# Patient Record
Sex: FEMALE | Race: WHITE | NOT HISPANIC OR LATINO | Employment: OTHER | URBAN - METROPOLITAN AREA
[De-identification: names, ages, dates, MRNs, and addresses within clinical notes are randomized per-mention and may not be internally consistent; named-entity substitution may affect disease eponyms.]

---

## 2017-01-03 ENCOUNTER — COMMUNICATION - HEALTHEAST (OUTPATIENT)
Dept: INTERNAL MEDICINE | Facility: CLINIC | Age: 77
End: 2017-01-03

## 2017-01-03 DIAGNOSIS — N95.2 ATROPHIC VAGINITIS: ICD-10-CM

## 2017-01-03 DIAGNOSIS — F41.9 ANXIETY: ICD-10-CM

## 2017-01-03 DIAGNOSIS — I10 HTN (HYPERTENSION): ICD-10-CM

## 2017-01-04 ENCOUNTER — OFFICE VISIT - HEALTHEAST (OUTPATIENT)
Dept: AUDIOLOGY | Facility: CLINIC | Age: 77
End: 2017-01-04

## 2017-01-04 DIAGNOSIS — H90.3 SENSORINEURAL HEARING LOSS, ASYMMETRICAL: ICD-10-CM

## 2017-01-04 DIAGNOSIS — H92.01 OTALGIA, RIGHT: ICD-10-CM

## 2017-01-06 ENCOUNTER — OFFICE VISIT - HEALTHEAST (OUTPATIENT)
Dept: OTOLARYNGOLOGY | Facility: CLINIC | Age: 77
End: 2017-01-06

## 2017-01-06 DIAGNOSIS — J01.01 RECURRENT MAXILLARY SINUSITIS: ICD-10-CM

## 2017-01-06 ASSESSMENT — MIFFLIN-ST. JEOR: SCORE: 1052.62

## 2017-01-11 ENCOUNTER — COMMUNICATION - HEALTHEAST (OUTPATIENT)
Dept: INTERNAL MEDICINE | Facility: CLINIC | Age: 77
End: 2017-01-11

## 2017-01-11 DIAGNOSIS — R06.2 WHEEZING: ICD-10-CM

## 2017-01-19 ENCOUNTER — HOSPITAL ENCOUNTER (OUTPATIENT)
Dept: MAMMOGRAPHY | Facility: HOSPITAL | Age: 77
Discharge: HOME | End: 2017-01-19
Attending: INTERNAL MEDICINE

## 2017-01-19 DIAGNOSIS — Z12.31 VISIT FOR SCREENING MAMMOGRAM: ICD-10-CM

## 2017-01-20 ENCOUNTER — HOSPITAL ENCOUNTER (OUTPATIENT)
Dept: MAMMOGRAPHY | Facility: HOSPITAL | Age: 77
Discharge: HOME | End: 2017-01-20
Attending: INTERNAL MEDICINE

## 2017-01-20 DIAGNOSIS — N64.89 BREAST ASYMMETRY: ICD-10-CM

## 2017-02-09 ENCOUNTER — OFFICE VISIT - HEALTHEAST (OUTPATIENT)
Dept: ALLERGY | Facility: CLINIC | Age: 77
End: 2017-02-09

## 2017-02-09 DIAGNOSIS — J30.89 ALLERGIC RHINITIS DUE TO OTHER ALLERGEN: ICD-10-CM

## 2017-02-09 DIAGNOSIS — Z88.9 DRUG ALLERGY: ICD-10-CM

## 2017-02-09 DIAGNOSIS — R06.2 WHEEZING: ICD-10-CM

## 2017-03-16 ENCOUNTER — OFFICE VISIT - HEALTHEAST (OUTPATIENT)
Dept: ALLERGY | Facility: CLINIC | Age: 77
End: 2017-03-16

## 2017-03-16 DIAGNOSIS — Z88.9 DRUG ALLERGY: ICD-10-CM

## 2017-03-16 DIAGNOSIS — J22 ACUTE RESPIRATORY INFECTION: ICD-10-CM

## 2017-03-16 ASSESSMENT — MIFFLIN-ST. JEOR: SCORE: 1031.63

## 2017-03-30 ENCOUNTER — COMMUNICATION - HEALTHEAST (OUTPATIENT)
Dept: INTERNAL MEDICINE | Facility: CLINIC | Age: 77
End: 2017-03-30

## 2017-03-30 DIAGNOSIS — J22 ACUTE RESPIRATORY INFECTION: ICD-10-CM

## 2017-04-21 ENCOUNTER — OFFICE VISIT - HEALTHEAST (OUTPATIENT)
Dept: INTERNAL MEDICINE | Facility: CLINIC | Age: 77
End: 2017-04-21

## 2017-04-21 DIAGNOSIS — K21.9 GERD (GASTROESOPHAGEAL REFLUX DISEASE): ICD-10-CM

## 2017-04-21 DIAGNOSIS — F41.9 ANXIETY: ICD-10-CM

## 2017-04-21 DIAGNOSIS — M19.90 OA (OSTEOARTHRITIS): ICD-10-CM

## 2017-04-21 DIAGNOSIS — I10 HTN (HYPERTENSION): ICD-10-CM

## 2017-04-21 DIAGNOSIS — N95.2 ATROPHIC VAGINITIS: ICD-10-CM

## 2017-04-21 DIAGNOSIS — J45.20 MILD INTERMITTENT ASTHMA: ICD-10-CM

## 2017-05-24 ENCOUNTER — COMMUNICATION - HEALTHEAST (OUTPATIENT)
Dept: INTERNAL MEDICINE | Facility: CLINIC | Age: 77
End: 2017-05-24

## 2017-05-24 DIAGNOSIS — H04.123 DRY EYES, BILATERAL: ICD-10-CM

## 2017-10-24 ENCOUNTER — OFFICE VISIT - HEALTHEAST (OUTPATIENT)
Dept: INTERNAL MEDICINE | Facility: CLINIC | Age: 77
End: 2017-10-24

## 2017-10-24 DIAGNOSIS — D17.20 LIPOMA OF ARM: ICD-10-CM

## 2017-10-24 DIAGNOSIS — Z23 NEED FOR INFLUENZA VACCINATION: ICD-10-CM

## 2017-10-24 DIAGNOSIS — D75.1 POLYCYTHEMIA: ICD-10-CM

## 2017-10-24 DIAGNOSIS — F41.9 ANXIETY: ICD-10-CM

## 2017-10-24 DIAGNOSIS — Z66 DNR (DO NOT RESUSCITATE): ICD-10-CM

## 2017-10-24 DIAGNOSIS — L28.0 LICHEN SIMPLEX CHRONICUS: ICD-10-CM

## 2017-10-24 DIAGNOSIS — J45.20 MILD INTERMITTENT ASTHMA: ICD-10-CM

## 2017-10-24 DIAGNOSIS — I10 HTN (HYPERTENSION): ICD-10-CM

## 2017-10-24 DIAGNOSIS — L28.0 NEURODERMATITIS: ICD-10-CM

## 2017-10-30 ENCOUNTER — OFFICE VISIT - HEALTHEAST (OUTPATIENT)
Dept: SURGERY | Facility: CLINIC | Age: 77
End: 2017-10-30

## 2017-10-30 DIAGNOSIS — R22.31 ARM MASS, RIGHT: ICD-10-CM

## 2017-10-30 ASSESSMENT — MIFFLIN-ST. JEOR: SCORE: 1010.77

## 2017-11-01 LAB
LAB AP CHARGES (HE HISTORICAL CONVERSION): NORMAL
PATH REPORT.COMMENTS IMP SPEC: NORMAL
PATH REPORT.FINAL DX SPEC: NORMAL
PATH REPORT.GROSS SPEC: NORMAL
PATH REPORT.MICROSCOPIC SPEC OTHER STN: NORMAL
PATH REPORT.RELEVANT HX SPEC: NORMAL
RESULT FLAG (HE HISTORICAL CONVERSION): NORMAL

## 2017-12-11 ENCOUNTER — OFFICE VISIT - HEALTHEAST (OUTPATIENT)
Dept: INTERNAL MEDICINE | Facility: CLINIC | Age: 77
End: 2017-12-11

## 2017-12-11 DIAGNOSIS — H61.22 IMPACTED CERUMEN OF LEFT EAR: ICD-10-CM

## 2017-12-11 DIAGNOSIS — J01.10 ACUTE FRONTAL SINUSITIS, RECURRENCE NOT SPECIFIED: ICD-10-CM

## 2017-12-28 ENCOUNTER — COMMUNICATION - HEALTHEAST (OUTPATIENT)
Dept: INTERNAL MEDICINE | Facility: CLINIC | Age: 77
End: 2017-12-28

## 2017-12-28 DIAGNOSIS — J30.9 RHINITIS, ALLERGIC: ICD-10-CM

## 2017-12-28 DIAGNOSIS — J45.20 MILD INTERMITTENT ASTHMA: ICD-10-CM

## 2017-12-28 DIAGNOSIS — F41.9 ANXIETY: ICD-10-CM

## 2017-12-28 DIAGNOSIS — I10 HTN (HYPERTENSION): ICD-10-CM

## 2018-01-02 ENCOUNTER — COMMUNICATION - HEALTHEAST (OUTPATIENT)
Dept: LAB | Facility: CLINIC | Age: 78
End: 2018-01-02

## 2018-01-02 ENCOUNTER — OFFICE VISIT - HEALTHEAST (OUTPATIENT)
Dept: INTERNAL MEDICINE | Facility: CLINIC | Age: 78
End: 2018-01-02

## 2018-01-02 DIAGNOSIS — J32.2 CHRONIC ETHMOIDAL SINUSITIS: ICD-10-CM

## 2018-01-02 ASSESSMENT — MIFFLIN-ST. JEOR: SCORE: 1018.03

## 2018-02-05 ENCOUNTER — HOSPITAL ENCOUNTER (OUTPATIENT)
Dept: MAMMOGRAPHY | Facility: HOSPITAL | Age: 78
Discharge: HOME | End: 2018-02-05
Attending: INTERNAL MEDICINE

## 2018-02-05 DIAGNOSIS — Z12.31 VISIT FOR SCREENING MAMMOGRAM: ICD-10-CM

## 2018-04-24 ENCOUNTER — OFFICE VISIT - HEALTHEAST (OUTPATIENT)
Dept: INTERNAL MEDICINE | Facility: CLINIC | Age: 78
End: 2018-04-24

## 2018-04-24 DIAGNOSIS — F41.9 ANXIETY: ICD-10-CM

## 2018-04-24 DIAGNOSIS — J45.20 MILD INTERMITTENT ASTHMA: ICD-10-CM

## 2018-04-24 DIAGNOSIS — J32.9 RHINOSINUSITIS: ICD-10-CM

## 2018-04-24 DIAGNOSIS — L60.3 BRITTLE NAILS: ICD-10-CM

## 2018-04-24 DIAGNOSIS — N95.1 MENOPAUSAL HOT FLUSHES: ICD-10-CM

## 2018-04-24 DIAGNOSIS — I10 HTN (HYPERTENSION): ICD-10-CM

## 2018-04-24 DIAGNOSIS — M19.90 OA (OSTEOARTHRITIS): ICD-10-CM

## 2018-05-22 ENCOUNTER — OFFICE VISIT - HEALTHEAST (OUTPATIENT)
Dept: INTERNAL MEDICINE | Facility: CLINIC | Age: 78
End: 2018-05-22

## 2018-05-22 DIAGNOSIS — N76.0 VAGINITIS: ICD-10-CM

## 2018-05-22 DIAGNOSIS — N95.2 ATROPHIC VAGINITIS: ICD-10-CM

## 2018-06-11 ENCOUNTER — COMMUNICATION - HEALTHEAST (OUTPATIENT)
Dept: INTERNAL MEDICINE | Facility: CLINIC | Age: 78
End: 2018-06-11

## 2018-06-11 DIAGNOSIS — N95.2 ATROPHIC VAGINITIS: ICD-10-CM

## 2018-10-26 ENCOUNTER — OFFICE VISIT - HEALTHEAST (OUTPATIENT)
Dept: INTERNAL MEDICINE | Facility: CLINIC | Age: 78
End: 2018-10-26

## 2018-10-26 DIAGNOSIS — K21.9 GERD (GASTROESOPHAGEAL REFLUX DISEASE): ICD-10-CM

## 2018-10-26 DIAGNOSIS — L82.1 SK (SEBORRHEIC KERATOSIS): ICD-10-CM

## 2018-10-26 DIAGNOSIS — J45.20 MILD INTERMITTENT ASTHMA: ICD-10-CM

## 2018-10-26 DIAGNOSIS — F41.9 ANXIETY: ICD-10-CM

## 2018-10-26 DIAGNOSIS — N95.2 ATROPHIC VAGINITIS: ICD-10-CM

## 2018-10-26 DIAGNOSIS — I10 HTN (HYPERTENSION): ICD-10-CM

## 2018-10-26 DIAGNOSIS — J30.9 AR (ALLERGIC RHINITIS): ICD-10-CM

## 2018-10-26 DIAGNOSIS — M19.90 OA (OSTEOARTHRITIS): ICD-10-CM

## 2018-10-26 DIAGNOSIS — Z23 NEED FOR INFLUENZA VACCINATION: ICD-10-CM

## 2018-10-26 DIAGNOSIS — K64.9 HEMORRHOIDS: ICD-10-CM

## 2018-10-30 ENCOUNTER — OFFICE VISIT - HEALTHEAST (OUTPATIENT)
Dept: PODIATRY | Facility: CLINIC | Age: 78
End: 2018-10-30

## 2018-10-30 DIAGNOSIS — M20.41 HAMMER TOE OF RIGHT FOOT: ICD-10-CM

## 2018-10-30 ASSESSMENT — MIFFLIN-ST. JEOR: SCORE: 1031.63

## 2018-10-31 ENCOUNTER — COMMUNICATION - HEALTHEAST (OUTPATIENT)
Dept: INTERNAL MEDICINE | Facility: CLINIC | Age: 78
End: 2018-10-31

## 2018-11-02 ENCOUNTER — COMMUNICATION - HEALTHEAST (OUTPATIENT)
Dept: INTERNAL MEDICINE | Facility: CLINIC | Age: 78
End: 2018-11-02

## 2018-11-20 ENCOUNTER — COMMUNICATION - HEALTHEAST (OUTPATIENT)
Dept: INTERNAL MEDICINE | Facility: CLINIC | Age: 78
End: 2018-11-20

## 2018-12-03 ENCOUNTER — COMMUNICATION - HEALTHEAST (OUTPATIENT)
Dept: INTERNAL MEDICINE | Facility: CLINIC | Age: 78
End: 2018-12-03

## 2018-12-26 ENCOUNTER — OFFICE VISIT - HEALTHEAST (OUTPATIENT)
Dept: FAMILY MEDICINE | Facility: CLINIC | Age: 78
End: 2018-12-26

## 2018-12-26 DIAGNOSIS — J01.91 ACUTE RECURRENT SINUSITIS, UNSPECIFIED LOCATION: ICD-10-CM

## 2019-01-17 ENCOUNTER — COMMUNICATION - HEALTHEAST (OUTPATIENT)
Dept: INTERNAL MEDICINE | Facility: CLINIC | Age: 79
End: 2019-01-17

## 2019-01-17 ENCOUNTER — OFFICE VISIT - HEALTHEAST (OUTPATIENT)
Dept: INTERNAL MEDICINE | Facility: CLINIC | Age: 79
End: 2019-01-17

## 2019-01-17 DIAGNOSIS — N89.8 VAGINAL DISCHARGE: ICD-10-CM

## 2019-01-17 DIAGNOSIS — N95.2 ATROPHIC VAGINITIS: ICD-10-CM

## 2019-01-17 DIAGNOSIS — K64.9 HEMORRHOIDS, UNSPECIFIED HEMORRHOID TYPE: ICD-10-CM

## 2019-01-17 LAB
CLUE CELLS: NORMAL
TRICHOMONAS, WET PREP: NORMAL
YEAST, WET PREP: NORMAL

## 2019-01-17 ASSESSMENT — MIFFLIN-ST. JEOR: SCORE: 1027.1

## 2019-01-21 ENCOUNTER — COMMUNICATION - HEALTHEAST (OUTPATIENT)
Dept: INTERNAL MEDICINE | Facility: CLINIC | Age: 79
End: 2019-01-21

## 2019-01-21 DIAGNOSIS — J30.9 RHINITIS, ALLERGIC: ICD-10-CM

## 2019-03-18 ENCOUNTER — OFFICE VISIT - HEALTHEAST (OUTPATIENT)
Dept: INTERNAL MEDICINE | Facility: CLINIC | Age: 79
End: 2019-03-18

## 2019-03-18 DIAGNOSIS — H69.92 DYSFUNCTION OF LEFT EUSTACHIAN TUBE: ICD-10-CM

## 2019-03-18 DIAGNOSIS — R60.0 BILATERAL LOWER EXTREMITY EDEMA: ICD-10-CM

## 2019-03-18 DIAGNOSIS — I10 ESSENTIAL HYPERTENSION: ICD-10-CM

## 2019-03-18 DIAGNOSIS — J32.9 RECURRENT SINUSITIS: ICD-10-CM

## 2019-03-18 DIAGNOSIS — M81.0 AGE RELATED OSTEOPOROSIS, UNSPECIFIED PATHOLOGICAL FRACTURE PRESENCE: ICD-10-CM

## 2019-03-18 DIAGNOSIS — N95.2 ATROPHIC VAGINITIS: ICD-10-CM

## 2019-03-18 DIAGNOSIS — J30.9 ALLERGIC RHINITIS, UNSPECIFIED SEASONALITY, UNSPECIFIED TRIGGER: ICD-10-CM

## 2019-03-18 DIAGNOSIS — K64.9 HEMORRHOIDS, UNSPECIFIED HEMORRHOID TYPE: ICD-10-CM

## 2019-03-18 DIAGNOSIS — F41.9 ANXIETY: ICD-10-CM

## 2019-03-18 DIAGNOSIS — K21.9 GASTROESOPHAGEAL REFLUX DISEASE WITHOUT ESOPHAGITIS: ICD-10-CM

## 2019-03-18 ASSESSMENT — MIFFLIN-ST. JEOR: SCORE: 1018.03

## 2019-04-15 ENCOUNTER — COMMUNICATION - HEALTHEAST (OUTPATIENT)
Dept: INTERNAL MEDICINE | Facility: CLINIC | Age: 79
End: 2019-04-15

## 2019-04-17 ENCOUNTER — COMMUNICATION - HEALTHEAST (OUTPATIENT)
Dept: INTERNAL MEDICINE | Facility: CLINIC | Age: 79
End: 2019-04-17

## 2019-04-17 DIAGNOSIS — J30.9 RHINITIS, ALLERGIC: ICD-10-CM

## 2019-04-26 ENCOUNTER — AMBULATORY - HEALTHEAST (OUTPATIENT)
Dept: NURSING | Facility: CLINIC | Age: 79
End: 2019-04-26

## 2019-04-26 ENCOUNTER — AMBULATORY - HEALTHEAST (OUTPATIENT)
Dept: INTERNAL MEDICINE | Facility: CLINIC | Age: 79
End: 2019-04-26

## 2019-04-26 ENCOUNTER — HOSPITAL ENCOUNTER (OUTPATIENT)
Dept: MAMMOGRAPHY | Facility: CLINIC | Age: 79
Discharge: HOME | End: 2019-04-26
Attending: INTERNAL MEDICINE

## 2019-04-26 DIAGNOSIS — Z12.31 VISIT FOR SCREENING MAMMOGRAM: ICD-10-CM

## 2019-06-25 ENCOUNTER — OFFICE VISIT - HEALTHEAST (OUTPATIENT)
Dept: FAMILY MEDICINE | Facility: CLINIC | Age: 79
End: 2019-06-25

## 2019-06-25 ENCOUNTER — AMBULATORY - HEALTHEAST (OUTPATIENT)
Dept: NURSING | Facility: CLINIC | Age: 79
End: 2019-06-25

## 2019-06-25 DIAGNOSIS — Z23 IMMUNIZATION DUE: ICD-10-CM

## 2019-06-25 DIAGNOSIS — N89.8 VAGINAL ITCHING: ICD-10-CM

## 2019-06-25 LAB
ALBUMIN UR-MCNC: NEGATIVE MG/DL
APPEARANCE UR: CLEAR
BACTERIA #/AREA URNS HPF: ABNORMAL HPF
BILIRUB UR QL STRIP: NEGATIVE
CLUE CELLS: NORMAL
COLOR UR AUTO: YELLOW
GLUCOSE UR STRIP-MCNC: NEGATIVE MG/DL
HGB UR QL STRIP: ABNORMAL
KETONES UR STRIP-MCNC: NEGATIVE MG/DL
LEUKOCYTE ESTERASE UR QL STRIP: NEGATIVE
NITRATE UR QL: NEGATIVE
PH UR STRIP: 5.5 [PH] (ref 5–8)
RBC #/AREA URNS AUTO: ABNORMAL HPF
SP GR UR STRIP: <=1.005 (ref 1–1.03)
SQUAMOUS #/AREA URNS AUTO: ABNORMAL LPF
TRICHOMONAS, WET PREP: NORMAL
UROBILINOGEN UR STRIP-ACNC: ABNORMAL
WBC #/AREA URNS AUTO: ABNORMAL HPF
YEAST, WET PREP: NORMAL

## 2019-09-16 ENCOUNTER — OFFICE VISIT - HEALTHEAST (OUTPATIENT)
Dept: INTERNAL MEDICINE | Facility: CLINIC | Age: 79
End: 2019-09-16

## 2019-09-16 DIAGNOSIS — I10 ESSENTIAL HYPERTENSION: ICD-10-CM

## 2019-09-16 DIAGNOSIS — N95.2 ATROPHIC VAGINITIS: ICD-10-CM

## 2019-09-16 DIAGNOSIS — J45.20 MILD INTERMITTENT ASTHMA WITHOUT COMPLICATION: ICD-10-CM

## 2019-09-16 DIAGNOSIS — M15.0 PRIMARY OSTEOARTHRITIS INVOLVING MULTIPLE JOINTS: ICD-10-CM

## 2019-09-16 DIAGNOSIS — L29.9 ITCHING OF EAR: ICD-10-CM

## 2019-09-16 DIAGNOSIS — F41.9 ANXIETY: ICD-10-CM

## 2019-09-16 DIAGNOSIS — M67.432 GANGLION CYST OF WRIST, LEFT: ICD-10-CM

## 2019-09-16 DIAGNOSIS — M25.532 LEFT WRIST PAIN: ICD-10-CM

## 2019-09-16 DIAGNOSIS — K21.9 GASTROESOPHAGEAL REFLUX DISEASE, ESOPHAGITIS PRESENCE NOT SPECIFIED: ICD-10-CM

## 2019-09-16 LAB
ALBUMIN SERPL-MCNC: 4.4 G/DL (ref 3.5–5)
ALP SERPL-CCNC: 76 U/L (ref 45–120)
ALT SERPL W P-5'-P-CCNC: 24 U/L (ref 0–45)
ANION GAP SERPL CALCULATED.3IONS-SCNC: 11 MMOL/L (ref 5–18)
AST SERPL W P-5'-P-CCNC: 37 U/L (ref 0–40)
BILIRUB SERPL-MCNC: 0.7 MG/DL (ref 0–1)
BUN SERPL-MCNC: 21 MG/DL (ref 8–28)
CALCIUM SERPL-MCNC: 10.3 MG/DL (ref 8.5–10.5)
CHLORIDE BLD-SCNC: 102 MMOL/L (ref 98–107)
CO2 SERPL-SCNC: 27 MMOL/L (ref 22–31)
CREAT SERPL-MCNC: 0.83 MG/DL (ref 0.6–1.1)
ERYTHROCYTE [DISTWIDTH] IN BLOOD BY AUTOMATED COUNT: 12.1 % (ref 11–14.5)
GFR SERPL CREATININE-BSD FRML MDRD: >60 ML/MIN/1.73M2
GLUCOSE BLD-MCNC: 78 MG/DL (ref 70–125)
HCT VFR BLD AUTO: 39.9 % (ref 35–47)
HGB BLD-MCNC: 13.7 G/DL (ref 12–16)
MCH RBC QN AUTO: 31.5 PG (ref 27–34)
MCHC RBC AUTO-ENTMCNC: 34.3 G/DL (ref 32–36)
MCV RBC AUTO: 92 FL (ref 80–100)
PLATELET # BLD AUTO: 225 THOU/UL (ref 140–440)
PMV BLD AUTO: 7.8 FL (ref 7–10)
POTASSIUM BLD-SCNC: 4.5 MMOL/L (ref 3.5–5)
PROT SERPL-MCNC: 7.1 G/DL (ref 6–8)
RBC # BLD AUTO: 4.34 MILL/UL (ref 3.8–5.4)
SODIUM SERPL-SCNC: 140 MMOL/L (ref 136–145)
WBC: 5.1 THOU/UL (ref 4–11)

## 2020-02-06 ENCOUNTER — COMMUNICATION - HEALTHEAST (OUTPATIENT)
Dept: INTERNAL MEDICINE | Facility: CLINIC | Age: 80
End: 2020-02-06

## 2020-02-19 ENCOUNTER — TELEPHONE (OUTPATIENT)
Dept: NEUROSURGERY | Facility: CLINIC | Age: 80
End: 2020-02-19

## 2020-02-19 NOTE — TELEPHONE ENCOUNTER
[facility] Call Center    Phone Message    May a detailed message be left on voicemail: yes     Reason for Call: Other: Pt had a fall in [place] and had cervical spine fracture surgery done on 02/03/2020 by Dr. Wolfgang Givens. He referred pt to Dr. Polanco and said he's a friend of Dr. Polanco's. Pt said Dr. Givens sent pt's records and images. Pt does have copies of the images on a CD. Please call pt to schedule an appointment.      Action Taken: Message routed to:  [facility] ([facility] Neurosurgery    Travel Screening: Not Applicable

## 2020-02-25 ENCOUNTER — OFFICE VISIT (OUTPATIENT)
Dept: NEUROSURGERY | Facility: CLINIC | Age: 80
End: 2020-02-25
Payer: COMMERCIAL

## 2020-02-25 VITALS
WEIGHT: 119.9 LBS | HEIGHT: 63 IN | DIASTOLIC BLOOD PRESSURE: 83 MMHG | HEART RATE: 68 BPM | OXYGEN SATURATION: 97 % | SYSTOLIC BLOOD PRESSURE: 134 MMHG | BODY MASS INDEX: 21.25 KG/M2

## 2020-02-25 DIAGNOSIS — S12.9XXD CLOSED FRACTURE OF CERVICAL VERTEBRA, UNSPECIFIED CERVICAL VERTEBRAL LEVEL, SUBSEQUENT ENCOUNTER: ICD-10-CM

## 2020-02-25 DIAGNOSIS — Z98.1 S/P CERVICAL SPINAL FUSION: Primary | ICD-10-CM

## 2020-02-25 RX ORDER — ALBUTEROL SULFATE 90 UG/1
1-2 AEROSOL, METERED RESPIRATORY (INHALATION) EVERY 4 HOURS PRN
COMMUNITY
Start: 2018-11-20

## 2020-02-25 RX ORDER — IPRATROPIUM BROMIDE 21 UG/1
2 SPRAY, METERED NASAL DAILY PRN
COMMUNITY

## 2020-02-25 RX ORDER — MULTIVIT WITH MINERALS/LUTEIN
1 TABLET ORAL DAILY
COMMUNITY

## 2020-02-25 RX ORDER — CYCLOSPORINE 0.5 MG/ML
1 EMULSION OPHTHALMIC 2 TIMES DAILY PRN
COMMUNITY
Start: 2017-05-28

## 2020-02-25 RX ORDER — ESTRADIOL 0.1 MG/G
2 CREAM VAGINAL
COMMUNITY

## 2020-02-25 RX ORDER — CITALOPRAM HYDROBROMIDE 20 MG/1
20 TABLET ORAL DAILY
Status: ON HOLD | COMMUNITY
Start: 2019-09-16 | End: 2022-11-01

## 2020-02-25 RX ORDER — METOPROLOL SUCCINATE 50 MG/1
50 TABLET, EXTENDED RELEASE ORAL DAILY
Status: ON HOLD | COMMUNITY
Start: 2019-09-16 | End: 2022-11-05

## 2020-02-25 RX ORDER — CETIRIZINE HYDROCHLORIDE 10 MG/1
10 TABLET ORAL DAILY PRN
COMMUNITY
Start: 2019-11-21 | End: 2023-06-19

## 2020-02-25 ASSESSMENT — ENCOUNTER SYMPTOMS
MYALGIAS: 1
LEG PAIN: 0
FATIGUE: 1
NAUSEA: 0
NIGHT SWEATS: 0
JOINT SWELLING: 0
ABDOMINAL PAIN: 0
NERVOUS/ANXIOUS: 1
DEPRESSION: 1
COUGH: 1
INSOMNIA: 1
DECREASED CONCENTRATION: 0
SNORES LOUDLY: 0
HEARTBURN: 1
BLOATING: 0
PANIC: 0
SLEEP DISTURBANCES DUE TO BREATHING: 1
SINUS CONGESTION: 0
FEVER: 0
POLYPHAGIA: 0
SPUTUM PRODUCTION: 0
VOMITING: 0
SYNCOPE: 1
TASTE DISTURBANCE: 0
PALPITATIONS: 0
CONSTIPATION: 1
SHORTNESS OF BREATH: 1
BACK PAIN: 1
WEIGHT LOSS: 1
HYPERTENSION: 1
HALLUCINATIONS: 0
DECREASED APPETITE: 1
WHEEZING: 0
WEIGHT GAIN: 0
CHILLS: 1
SMELL DISTURBANCE: 0
ORTHOPNEA: 0
INCREASED ENERGY: 1
HEMOPTYSIS: 0
ARTHRALGIAS: 0
NECK PAIN: 1

## 2020-02-25 ASSESSMENT — MIFFLIN-ST. JEOR: SCORE: 987.99

## 2020-02-25 ASSESSMENT — PAIN SCALES - GENERAL: PAINLEVEL: NO PAIN (0)

## 2020-02-25 NOTE — TELEPHONE ENCOUNTER
Patient was scheduled with Dr. Polanco per request on 3/13. Patient wanted to be seen sooner so was scheduled on 2/25 with SOPHIA Tan.    Shikha Naqvi  Director of Customer

## 2020-02-25 NOTE — NURSING NOTE
Chief Complaint   Patient presents with     Consult     [facility] NEW - SURGERY FOLLOW UP       Blayne Devi, EMT

## 2020-02-25 NOTE — PROGRESS NOTES
"2/25/2020     Neurosurgery Clinic Note        Reason for visit:              Cervical spine fx - now s/p anterior/posterior spinal fusion     History of present illness:  Ms. Castellano is a very pleasant 79 yr-old female who was Vacationing in [facility] and had fell on the bench on Feb 2nd, 2020 while sleepwalking.   She fell and hit head and neck.    She did not lose feeling or sensation in bilateral upper or lower extremities after her fall.  She was able to move her arms and legs, but she did not try to stand or ambulate.   Taken to [facility]  And then transported to [facility] for surgical repair of cervical spine fracture with C6-C7 laminectomy and anterior fusion. Also, C5-T1 fixation with bilateral pedicle screws on 2/03/2020 with Dr. Wolfgang Givens.     Today, she has some pain however mostly this is in bilateral upper back/shoulders.  Using Voltaren Gel.  Helpful   She does not really have neck pain.  Her neck feels \"tired\", but no pain.  She has mostly muscle pain.   No radiating pain, numbness or tingling down bilateral arms.  No numbness/tingling in hands/fingers.   She was seen in the emgergency room this past Weds (Feb 19th) with some pain in ribs/chest with deep breathing.  She underwent extensive workup with imaging and labwork and negative for pulmonary embolus or pneumonia.  Dx'd with pleurisy.  This has improved.    Initially she had some difficulty with swallowing, but now able to swallow large calcium pills, and swallow food without difficulty.  No voice hoarseness.        Review of systems: 10 point ROS negative except for as detailed in HPI  Past Medical History:   Mild intermittent asthma without complication   Atrophic vaginitis   Essential hypertension  Anxiety   Primary osteoarthritis involving multiple joints   Ganglion cyst of wrist, left     GERD (gastroesophageal reflux disease)     AR (allergic rhinitis)     Anxiety - Panic attacks and OCD features     ETD " "(eustachian tube dysfunction)     Surgical History:   Hysterectomy  Bunion surgery - Bilateral feet   Foot fx-  Left   Vein Stripping  Phlebectomy  Lumpectomy - left breast (benign)     Medications:  Current Outpatient Medications   Medication     albuterol (PROAIR HFA/PROVENTIL HFA/VENTOLIN HFA) 108 (90 Base) MCG/ACT inhaler     cetirizine (ZYRTEC) 10 MG tablet     citalopram (CELEXA) 20 MG tablet     cycloSPORINE (RESTASIS) 0.05 % ophthalmic emulsion     estradiol (ESTRACE) 0.1 MG/GM vaginal cream     ipratropium (ATROVENT) 0.03 % nasal spray     methylcellulose (CITRUCEL) 500 MG TABS tablet     metoprolol succinate ER (TOPROL-XL) 50 MG 24 hr tablet     multivitamin (CENTRUM SILVER) tablet     polyethylene glycol-propylene glycol (SYSTANE ULTRA) 0.4-0.3 % SOLN ophthalmic solution     No current facility-administered medications for this visit.      Social History     Socioeconomic History     Marital status:    Occupational History     None   Tobacco Use     Smoking status: Former Smoker     Last attempt to quit: 1989     Years since quittin.0     Smokeless tobacco: Never Used       Medical History Relation Name Comments   Ovarian cancer Mother       BRCA 1/2 Neg Hx       Breast cancer Neg Hx       Cancer Neg Hx       Colon cancer Neg Hx       Endometrial cancer Neg Hx           Physical exam:   /83 (BP Location: Left arm, Patient Position: Sitting, Cuff Size: Adult Regular)   Pulse 68   Ht 1.6 m (5' 3\")   Wt 54.4 kg (119 lb 14.4 oz)   SpO2 97%   BMI 21.24 kg/m      General: Awake and alert and in no acute distress.  Pulm: Breathing comfortably on room air  CN: Symmetric browlift, smile, tongue protrusion, palate elevation, and sternocleidomastoids. No dysarthria. Extraocular muscles are all intact. Pupils react bilaterally and equally  Coordination: Intact finger-nose-finger bilaterally. Symmetric rapid alternating movements in bilateral upper extremities   Motor: No pronator drift. "   Good muscle bulk throughout.   Bilateral upper extremities    5/5 including WF/WE/, IO  Bilateral lower extremities    5/5 including DF/PF  Able to heel walk / toe walk   Gait: Intact tandem gait. Negative Romberg  Reflexes:  No Hyper-reflexia or ankle clonus \  Incisions  Anterior, Clean, dry, and intact.  There is no redness/erythema, swelling, or open drainage.   Closed with steri-strips.   Posterior, Clean, dry, and intact.  There is no redness/erythema, swelling, or open drainage.     IMAGING:    CT SPINE (CERVICAL) WITHOUT CONTRAST    There is a 3.6 mm fracture fragment extending off the anterior  inferior C7 vertebra exhibiting a mild anterior wedge compression  deformity as well as having bilateral acute pedicle fractures and  fractures extending into the foramen transversalis with bilateral  fractures of the transverse process. This could represent an extension  teardrop fracture.    Fracture of the right superior facet of C7 extends into the right  pedicle fracture also present. This fracture is mildly displaced.    Bilateral perched facet at the C6-C7 levels, unstable finding.    Mild compression fracture of the superior endplate of T3, 5-10%.    Mild to moderate reduction of disc space height from C3 to C7 with  endplate sclerotic changes and small anterior osteophytes are present.    Lung apices: Bilateral emphysematous changes are present.     Result Status: Finalized  Authenticating Radiologist: Akosua Crouch    EXAMINATION: MRI OF THE CERVICAL SPINE WITHOUT IV CONTRAST    CLINICAL INDICATION: Neck trauma. C7 fracture on CT.    FINDINGS:     There is a left perched facet at C6-C7.  Fractures of the bilateral C7 pedicles and left C7 transverse process are better  visualized on comparison CT.  There is abnormal STIR hyperintense signal within the right C7 facet, compatible  with fracture seen on CT.  There is abnormal signal within the C7 anterior inferior endplates, compatible  with  fracture. There is trace prevertebral soft tissue swelling at C6-C7.    There is associated interspinous and ligament of flavum injury at C6-C7 with  widening of the posterior elements.    There is extensive STIR hyperintense signal throughout the posterior paraspinal  musculature extending from the craniocervical junction through the upper  thoracic spine, compatible with muscle sprain.  The nuchal ligament is attenuated superiorly at the C2-C3 level which may  represent ligamentous sprain.  The anterior and posterior longitudinal ligaments appear intact.    The cervical spinal cord is normal in signal intensity. No evidence of edema or  contusion.    No epidural fluid collection is identified.  The imaged cervical vascular flow voids demonstrate normal signal.    The included intracranial structures are grossly normal.     There is multilevel disc desiccation and disc height loss from C3-C4 through  C6-C7. There are multilevel discogenic endplate degenerative marrow signal  changes.    Evaluation of the individual levels demonstrates:    C2-3: No disc bulge, spinal canal stenosis or neuroforaminal narrowing.    C3-4: Small posterior disc osteophyte complex does not significantly narrow the  spinal canal. No significant neuroforaminal narrowing.    C4-5: Posterior disc osteophyte complex and small superimposed central disc  extrusion does not significantly narrow the spinal canal. Bilateral facet  arthropathy. Moderate bilateral neuroforaminal narrowing.    C5-6: Mild posterior disc defect complex does not significantly narrow the  spinal canal. Moderate bilateral neuroforaminal narrowing.    C6-7: No disc bulge, spinal canal stenosis or neuroforaminal narrowing.    C7-T1: No disc bulge, spinal canal stenosis or neuroforaminal narrowing.      Assessment:                Cervical spine fracture s/p anterior/posterior fusion        Plan:  ~Continue in Cervical collar (okay to remove at night for sleep per her  surgeon)  ~Continue Tylenol (as needed) in am and pm  ~No anti-inflammatory medications (Ibuprofen, or Aleve)  ~Ok to continue muscle pain cream to upper/back shoulders  ~No driving  ~No lifting greater that 5-8 lbs.  (gallon of milk)  ~No formal physical therapy (okay to walk)  ~Continue walker when out of house (for safety) but can discontinue if able  ~No haircut or hair coloring.  Okay to shower and shampoo hair   ~Keep incisions clean and dry. No lotions or soap  ~See primary care provider   ~Return to [facility] with Dr. Polanco with xray imaging  (scheduled)       Claudette Medina DNP  Neurosurgery Nurse Practitioner  [facility]  [phone]

## 2020-02-25 NOTE — LETTER
"2/25/2020       RE: Neena Castellano  [address]  [place] [address]     Dear Colleague,    Thank you for referring your patient, Neena Castellano, to the [facility] NEUROSURGERY at [facility]. Please see a copy of my visit note below.    2/25/2020     Neurosurgery Clinic Note        Reason for visit:              Cervical spine fx - now s/p anterior/posterior spinal fusion     History of present illness:  Ms. Castellano is a very pleasant 79 yr-old female who was Vacationing in [facility] and had fell on the bench on Feb 2nd, 2020 while sleepwalking.   She fell and hit head and neck.    She did not lose feeling or sensation in bilateral upper or lower extremities after her fall.  She was able to move her arms and legs, but she did not try to stand or ambulate.   Taken to [facility] -  And then transported to [facility] for surgical repair of cervical spine fracture with C6-C7 laminectomy and anterior fusion. Also, C5-T1 fixation with bilateral pedicle screws on 2/03/2020 with Dr. Wolfgang Givens.     Today, she has some pain however mostly this is in bilateral upper back/shoulders.  Using Voltaren Gel.  Helpful   She does not really have neck pain.  Her neck feels \"tired\", but no pain.  She has mostly muscle pain.   No radiating pain, numbness or tingling down bilateral arms.  No numbness/tingling in hands/fingers.   She was seen in the emgergency room this past Weds (Feb 19th) with some pain in ribs/chest with deep breathing.  She underwent extensive workup with imaging and labwork and negative for pulmonary embolus or pneumonia.  Dx'd with pleurisy.  This has improved.    Initially she had some difficulty with swallowing, but now able to swallow large calcium pills, and swallow food without difficulty.  No voice hoarseness.        Review of systems: 10 point ROS negative except for as detailed in HPI  Past Medical History:   Mild " "intermittent asthma without complication   Atrophic vaginitis   Essential hypertension  Anxiety   Primary osteoarthritis involving multiple joints   Ganglion cyst of wrist, left     GERD (gastroesophageal reflux disease)     AR (allergic rhinitis)     Anxiety - Panic attacks and OCD features     ETD (eustachian tube dysfunction)     Surgical History:   Hysterectomy  Bunion surgery - Bilateral feet   Foot fx-  Left   Vein Stripping  Phlebectomy  Lumpectomy - left breast (benign)     Medications:  Current Outpatient Medications   Medication     albuterol (PROAIR HFA/PROVENTIL HFA/VENTOLIN HFA) 108 (90 Base) MCG/ACT inhaler     cetirizine (ZYRTEC) 10 MG tablet     citalopram (CELEXA) 20 MG tablet     cycloSPORINE (RESTASIS) 0.05 % ophthalmic emulsion     estradiol (ESTRACE) 0.1 MG/GM vaginal cream     ipratropium (ATROVENT) 0.03 % nasal spray     methylcellulose (CITRUCEL) 500 MG TABS tablet     metoprolol succinate ER (TOPROL-XL) 50 MG 24 hr tablet     multivitamin (CENTRUM SILVER) tablet     polyethylene glycol-propylene glycol (SYSTANE ULTRA) 0.4-0.3 % SOLN ophthalmic solution     No current facility-administered medications for this visit.      Social History     Socioeconomic History     Marital status:    Occupational History     None   Tobacco Use     Smoking status: Former Smoker     Last attempt to quit: 1989     Years since quittin.0     Smokeless tobacco: Never Used       Medical History Relation Name Comments   Ovarian cancer Mother       BRCA 1/2 Neg Hx       Breast cancer Neg Hx       Cancer Neg Hx       Colon cancer Neg Hx       Endometrial cancer Neg Hx           Physical exam:   /83 (BP Location: Left arm, Patient Position: Sitting, Cuff Size: Adult Regular)   Pulse 68   Ht 1.6 m (5' 3\")   Wt 54.4 kg (119 lb 14.4 oz)   SpO2 97%   BMI 21.24 kg/m       General: Awake and alert and in no acute distress.  Pulm: Breathing comfortably on room air  CN: Symmetric browlift, smile, " tongue protrusion, palate elevation, and sternocleidomastoids. No dysarthria. Extraocular muscles are all intact. Pupils react bilaterally and equally  Coordination: Intact finger-nose-finger bilaterally. Symmetric rapid alternating movements in bilateral upper extremities   Motor: No pronator drift.   Good muscle bulk throughout.   Bilateral upper extremities    5/5 including WF/WE/, IO  Bilateral lower extremities    5/5 including DF/PF  Able to heel walk / toe walk   Gait: Intact tandem gait. Negative Romberg  Reflexes:  No Hyper-reflexia or ankle clonus \  Incisions  Anterior, Clean, dry, and intact.  There is no redness/erythema, swelling, or open drainage.   Closed with steri-strips.   Posterior, Clean, dry, and intact.  There is no redness/erythema, swelling, or open drainage.     IMAGING:    CT SPINE (CERVICAL) WITHOUT CONTRAST    There is a 3.6 mm fracture fragment extending off the anterior  inferior C7 vertebra exhibiting a mild anterior wedge compression  deformity as well as having bilateral acute pedicle fractures and  fractures extending into the foramen transversalis with bilateral  fractures of the transverse process. This could represent an extension  teardrop fracture.    Fracture of the right superior facet of C7 extends into the right  pedicle fracture also present. This fracture is mildly displaced.    Bilateral perched facet at the C6-C7 levels, unstable finding.    Mild compression fracture of the superior endplate of T3, 5-10%.    Mild to moderate reduction of disc space height from C3 to C7 with  endplate sclerotic changes and small anterior osteophytes are present.    Lung apices: Bilateral emphysematous changes are present.     Result Status: Finalized  Authenticating Radiologist: Akosua Crouch    EXAMINATION: MRI OF THE CERVICAL SPINE WITHOUT IV CONTRAST    CLINICAL INDICATION: Neck trauma. C7 fracture on CT.    FINDINGS:     There is a left perched facet at C6-C7.  Fractures of  the bilateral C7 pedicles and left C7 transverse process are better  visualized on comparison CT.  There is abnormal STIR hyperintense signal within the right C7 facet, compatible  with fracture seen on CT.  There is abnormal signal within the C7 anterior inferior endplates, compatible  with fracture. There is trace prevertebral soft tissue swelling at C6-C7.    There is associated interspinous and ligament of flavum injury at C6-C7 with  widening of the posterior elements.    There is extensive STIR hyperintense signal throughout the posterior paraspinal  musculature extending from the craniocervical junction through the upper  thoracic spine, compatible with muscle sprain.  The nuchal ligament is attenuated superiorly at the C2-C3 level which may  represent ligamentous sprain.  The anterior and posterior longitudinal ligaments appear intact.    The cervical spinal cord is normal in signal intensity. No evidence of edema or  contusion.    No epidural fluid collection is identified.  The imaged cervical vascular flow voids demonstrate normal signal.    The included intracranial structures are grossly normal.     There is multilevel disc desiccation and disc height loss from C3-C4 through  C6-C7. There are multilevel discogenic endplate degenerative marrow signal  changes.    Evaluation of the individual levels demonstrates:    C2-3: No disc bulge, spinal canal stenosis or neuroforaminal narrowing.    C3-4: Small posterior disc osteophyte complex does not significantly narrow the  spinal canal. No significant neuroforaminal narrowing.    C4-5: Posterior disc osteophyte complex and small superimposed central disc  extrusion does not significantly narrow the spinal canal. Bilateral facet  arthropathy. Moderate bilateral neuroforaminal narrowing.    C5-6: Mild posterior disc defect complex does not significantly narrow the  spinal canal. Moderate bilateral neuroforaminal narrowing.    C6-7: No disc bulge, spinal canal  stenosis or neuroforaminal narrowing.    C7-T1: No disc bulge, spinal canal stenosis or neuroforaminal narrowing.      Assessment:                Cervical spine fracture s/p anterior/posterior fusion        Plan:  ~Continue in Cervical collar (okay to remove at night for sleep per her surgeon)  ~Continue Tylenol (as needed) in am and pm  ~No anti-inflammatory medications (Ibuprofen, or Aleve)  ~Ok to continue muscle pain cream to upper/back shoulders  ~No driving  ~No lifting greater that 5-8 lbs.  (gallon of milk)  ~No formal physical therapy (okay to walk)  ~Continue walker when out of house (for safety) but can discontinue if able  ~No haircut or hair coloring.  Okay to shower and shampoo hair   ~Keep incisions clean and dry. No lotions or soap  ~See primary care provider   ~Return to [facility] with Dr. Polanco with xray imaging  (scheduled)     Claudette Medina DNP  Neurosurgery Nurse Practitioner  [facility]  [phone]

## 2020-02-25 NOTE — PATIENT INSTRUCTIONS
~Continue in Cervical collar (okay to remove at night for sleep)  ~Continue Tylenol (as needed) in am and pm  ~No anti-inflammatory medications (Ibuprofen, or Aleve)  ~Ok to continue muscle pain cream to upper/back shoulders  ~No driving  ~No lifting greater that 5-8 lbs.  (gallon of milk)  ~No formal physical therapy (okay to walk)  ~Continue walker when out of house (for safety) but can discontinue if able  ~No haircut or hair coloring.  Okay to shower and shampoo hair   ~Keep incisions clean and dry. No lotions or soap  ~See primary care provider

## 2020-03-04 ENCOUNTER — PRE VISIT (OUTPATIENT)
Dept: NEUROSURGERY | Facility: CLINIC | Age: 80
End: 2020-03-04

## 2020-03-04 NOTE — TELEPHONE ENCOUNTER
FUTURE VISIT INFORMATION      FUTURE VISIT INFORMATION:    Date: 3/13/2020    Time: 215pm    [place] [facility] [place] INFORMATION:    Referring provider:      Referring providers clinic:      Reason for visit/diagnosis      RECORDS REQUESTED FROM:       Clinic name Comments Records Status Imaging Status   [facility] CT Lumbar/Thoracic Spine-2/3/2020     Care Everywhere PACS    [facility]  CT Head-2/2/2020  CT Spine 2/2/2020 Care Everywhere PACS

## 2020-03-13 ENCOUNTER — OFFICE VISIT (OUTPATIENT)
Dept: NEUROSURGERY | Facility: CLINIC | Age: 80
End: 2020-03-13
Attending: NEUROLOGICAL SURGERY
Payer: COMMERCIAL

## 2020-03-13 VITALS
DIASTOLIC BLOOD PRESSURE: 74 MMHG | RESPIRATION RATE: 14 BRPM | WEIGHT: 121.4 LBS | HEART RATE: 69 BPM | BODY MASS INDEX: 21.51 KG/M2 | HEIGHT: 63 IN | TEMPERATURE: 97.5 F | SYSTOLIC BLOOD PRESSURE: 145 MMHG | OXYGEN SATURATION: 98 %

## 2020-03-13 DIAGNOSIS — Z98.1 S/P CERVICAL SPINAL FUSION: Primary | ICD-10-CM

## 2020-03-13 PROCEDURE — G0463 HOSPITAL OUTPT CLINIC VISIT: HCPCS | Mod: ZF

## 2020-03-13 ASSESSMENT — PAIN SCALES - GENERAL: PAINLEVEL: NO PAIN (0)

## 2020-03-13 ASSESSMENT — MIFFLIN-ST. JEOR: SCORE: 994.67

## 2020-03-13 NOTE — LETTER
"3/13/2020     RE: Neena Castellano  [address]  [place] [address]     Dear Colleague,    Thank you for referring your patient, Neena Castellano, to the [facility] CANCER [facility]. Please see a copy of my visit note below.    Neurosurgery Clinic Note    S/p fall, requiring a C6/7 ACDF and C5-T1 fixation    79 F s/p fall in [place] in February 2020, suffering cervical spine fracture requiring C6/7 ACDF and C5-T11 posterior fixation fusion. She is visiting today to establish care.    No new complaints on review of systems. The patient generally feels that she has more energy. Denied neurologic changes, bowel/bladder in continence. The patient continues to wear her c-collar.    BP (!) 145/74 (BP Location: Left arm, Patient Position: Chair, Cuff Size: Adult Regular)   Pulse 69   Temp 97.5  F (36.4  C) (Oral)   Resp 14   Ht 1.6 m (5' 2.99\")   Wt 55.1 kg (121 lb 6.4 oz)   SpO2 98%   BMI 21.51 kg/m      Examination non-focal. ACDF and posterior fusion incision dry, clean, and intact.    AP: 79 F doing well after C6/7 ACDF and C5-T11 posterior instrumented fusion. I believe that it is now safe to wean the patient off the collar. I had recommended a 3 week taper schedule and had outlined this taper to the patient. The patient will proceed with the taper.  I had answered the list of questions that she presented.   She is stable to be DC'ed from the clinic and will call my clinic should further questions arise.    I have spent 45  minutes today, with the majority of the visit counseling the patient on the diagnosis. All questions were answered. Over 50% of my time on the unit was spent counseling the patient in terms of post-operative care as well as answering her list of questions.    Again, thank you for allowing me to participate in the care of your patient.      Sincerely,    Balwinder Polanco MD  "

## 2020-03-13 NOTE — NURSING NOTE
"Oncology Rooming Note    March 13, 2020 1:31 PM   Neena Castellano is a 79 year old female who presents for:    Chief Complaint   Patient presents with     Oncology Clinic Visit     New; Cerviacl Spine Fracture     Initial Vitals: BP (!) 145/74 (BP Location: Left arm, Patient Position: Chair, Cuff Size: Adult Regular)   Pulse 69   Temp 97.5  F (36.4  C) (Oral)   Resp 14   Ht 1.6 m (5' 2.99\")   Wt 55.1 kg (121 lb 6.4 oz)   SpO2 98%   BMI 21.51 kg/m   Estimated body mass index is 21.51 kg/m  as calculated from the following:    Height as of this encounter: 1.6 m (5' 2.99\").    Weight as of this encounter: 55.1 kg (121 lb 6.4 oz). Body surface area is 1.56 meters squared.  No Pain (0) Comment: Data Unavailable   No LMP recorded. Patient is postmenopausal.  Allergies reviewed: Yes  Medications reviewed: Yes    Medications: Medication refills not needed today.  Pharmacy name entered into [employer]: [facility] [id number] [place] [facility] [place] [address] [address]    Clinical concerns: Surgical options.        Risa Blunt CMA              "

## 2020-03-13 NOTE — PROGRESS NOTES
"Neurosurgery Clinic Note    S/p fall, requiring a C6/7 ACDF and C5-T1 fixation    79 F s/p fall in [place] in February 2020, suffering cervical spine fracture requiring C6/7 ACDF and C5-T11 posterior fixation fusion. She is visiting today to establish care.    No new complaints on review of systems. The patient generally feels that she has more energy. Denied neurologic changes, bowel/bladder in continence. The patient continues to wear her c-collar.    BP (!) 145/74 (BP Location: Left arm, Patient Position: Chair, Cuff Size: Adult Regular)   Pulse 69   Temp 97.5  F (36.4  C) (Oral)   Resp 14   Ht 1.6 m (5' 2.99\")   Wt 55.1 kg (121 lb 6.4 oz)   SpO2 98%   BMI 21.51 kg/m      Examination non-focal. ACDF and posterior fusion incision dry, clean, and intact.    AP: 79 F doing well after C6/7 ACDF and C5-T11 posterior instrumented fusion. I believe that it is now safe to wean the patient off the collar. I had recommended a 3 week taper schedule and had outlined this taper to the patient. The patient will proceed with the taper.  I had answered the list of questions that she presented.   She is stable to be DC'ed from the clinic and will call my clinic should further questions arise.    I have spent 45  minutes today, with the majority of the visit counseling the patient on the diagnosis. All questions were answered. Over 50% of my time on the unit was spent counseling the patient in terms of post-operative care as well as answering her list of questions.  "

## 2020-08-03 ENCOUNTER — COMMUNICATION - HEALTHEAST (OUTPATIENT)
Dept: INTERNAL MEDICINE | Facility: CLINIC | Age: 80
End: 2020-08-03

## 2020-08-03 DIAGNOSIS — I10 ESSENTIAL HYPERTENSION: ICD-10-CM

## 2021-01-04 ENCOUNTER — HEALTH MAINTENANCE LETTER (OUTPATIENT)
Age: 81
End: 2021-01-04

## 2021-02-08 ENCOUNTER — COMMUNICATION - HEALTHEAST (OUTPATIENT)
Dept: INTERNAL MEDICINE | Facility: CLINIC | Age: 81
End: 2021-02-08

## 2021-05-27 ENCOUNTER — RECORDS - HEALTHEAST (OUTPATIENT)
Dept: ADMINISTRATIVE | Facility: CLINIC | Age: 81
End: 2021-05-27

## 2021-05-27 NOTE — TELEPHONE ENCOUNTER
New Appointment Needed  What is the reason for the visit:  Shingles vaccine   Same Date/Next Day Appt Request  What is the reason for your visit?: shingles vaccine     Provider Preference: Any available  How soon do you need to be seen?: as soon it comes in   Waitlist offered?: Yes  Okay to leave a detailed message:  Yes

## 2021-05-28 ENCOUNTER — RECORDS - HEALTHEAST (OUTPATIENT)
Dept: ADMINISTRATIVE | Facility: CLINIC | Age: 81
End: 2021-05-28

## 2021-05-30 VITALS — WEIGHT: 128 LBS | HEIGHT: 64 IN | BODY MASS INDEX: 21.85 KG/M2

## 2021-05-30 VITALS — HEIGHT: 65 IN | BODY MASS INDEX: 21.66 KG/M2 | WEIGHT: 130 LBS

## 2021-05-30 VITALS — BODY MASS INDEX: 21.73 KG/M2 | WEIGHT: 125.6 LBS

## 2021-05-30 VITALS — WEIGHT: 129 LBS | BODY MASS INDEX: 21.8 KG/M2

## 2021-05-30 NOTE — PATIENT INSTRUCTIONS - HE
1) Follow up with [facility] women's care or with your personal OB/GYN. The number for women's care appointments is [phone].  2) Currently your wet prep is negative and you're urine test does not show signs of urinary tract infection.   3) While you are waiting to follow up with women's care please continue using Replens and Estrace.

## 2021-05-30 NOTE — PROGRESS NOTES
Chief Complaint   Patient presents with     Vaginal Discharge     yellow discharge, vaginal itch, some abdominal cramps. has been using replense. states this has been going on since her visit in january       HPI:  Nenea Castellano is a 78 y.o. female who presents today complaining of vaginal itching, discharge, and low abdominal cramping.  Patient has been using replenish.  Her symptoms have been present since her visit in January.    History obtained from the patient.    Problem List:  2017-10: DNR (do not resuscitate)  2016-10: OA (osteoarthritis)  2016-03: Sore throat  2015-10: Hoarseness of voice  2015-10: Otalgia of right ear  2014-12: Palpitations  2014-11: Hallux valgus (acquired)  2014-11: Other hammer toe (acquired)  Postmenopausal atrophic vaginitis  Primary Varicose Veins  Chronic rhinitis  Esophageal reflux  Hiatal Hernia  Hypertension  Asthma  Headache  Joint Pain, Localized In The Left Shoulder  Synovial cyst of popliteal space  Rotator Cuff Tendonitis  Cerumen Impaction In Both Ears  Acute Sinusitis  Hyperlipidemia  Allergic rhinitis  Eustachian Tube Dysfunction  Generalized Osteoarthritis  Ganglion Of The Left Wrist  Osteopenia  Ankle Joint Pain  Anxiety  Chronic Sinusitis  Atrophic vaginitis  GERD (gastroesophageal reflux disease)  HTN (hypertension)  Mild intermittent asthma  AR (allergic rhinitis)  Anxiety - Panic attacks and OCD features  ETD (eustachian tube dysfunction)  Former smoker      Past Medical History:   Diagnosis Date     Anxiety      AR (allergic rhinitis)      Atrophic vaginitis      DNR (do not resuscitate) 10/25/2017     ETD (eustachian tube dysfunction)      Former smoker      GERD (gastroesophageal reflux disease)      HLD (hyperlipidemia)      HTN (hypertension)      Mild intermittent asthma      OA (osteoarthritis) 10/21/2016     Rotator cuff tendonitis      Varicose vein        Social History     Tobacco Use     Smoking status: Former Smoker     Packs/day: 1.00     Years:  20.00     Pack years: 20.00     Types: Cigarettes     Last attempt to quit: 1989     Years since quittin.4     Smokeless tobacco: Never Used   Substance Use Topics     Alcohol use: No       Review of Systems   Constitutional: Negative for fever.   Gastrointestinal: Positive for abdominal pain (intermittent low cramping).   Genitourinary: Positive for vaginal discharge. Negative for dysuria and frequency.        (+) vaginal itching       Vitals:    19 0911   BP: 134/71   Patient Site: Right Arm   Patient Position: Sitting   Cuff Size: Adult Regular   Pulse: 60   Temp: 98  F (36.7  C)   TempSrc: Oral   SpO2: 99%   Weight: 122 lb 12.8 oz (55.7 kg)       Physical Exam   Constitutional: She appears well-developed and well-nourished. No distress.   HENT:   Head: Normocephalic and atraumatic.   Right Ear: External ear normal.   Left Ear: External ear normal.   Eyes: Conjunctivae are normal. Right eye exhibits no discharge. Left eye exhibits no discharge.   Cardiovascular: Normal rate, regular rhythm and normal heart sounds.   Pulmonary/Chest: Effort normal and breath sounds normal. No respiratory distress.   Skin: She is not diaphoretic.   Psychiatric: She has a normal mood and affect. Her behavior is normal. Judgment and thought content normal.       Labs:  Recent Results (from the past 72 hour(s))   Urinalysis-UC if Indicated   Result Value Ref Range    Color, UA Yellow Colorless, Yellow, Straw, Light Yellow    Clarity, UA Clear Clear    Glucose, UA Negative Negative    Bilirubin, UA Negative Negative    Ketones, UA Negative Negative    Specific Gravity, UA <=1.005 1.005 - 1.030    Blood, UA Trace (!) Negative    pH, UA 5.5 5.0 - 8.0    Protein, UA Negative Negative mg/dL    Urobilinogen, UA 0.2 E.U./dL 0.2 E.U./dL, 1.0 E.U./dL    Nitrite, UA Negative Negative    Leukocytes, UA Negative Negative    Bacteria, UA None Seen None Seen hpf    RBC, UA 0-2 None Seen, 0-2 hpf    WBC, UA None Seen None Seen, 0-5  hpf    Squam Epithel, UA 0-5 None Seen, 0-5 lpf   Wet Prep, Vaginal   Result Value Ref Range    Yeast Result No yeast seen No yeast seen    Trichomonas No Trichomonas seen No Trichomonas seen    Clue Cells, Wet Prep No Clue cells seen No Clue cells seen         Clinical Decision Making:  Patient was seen in January for vaginal itching.  At that time she was diagnosed with atrophic vaginitis.  Wet prep was negative for any signs of infection, which she was treated for with Diflucan in case it was false negative.  She did not have any significant improvement after the Diflucan.  She has been using Replens and Estrace both of which give some relief, but her symptoms continue to return and wax and wane.  Due to the patient being on the first and second line therapy for atrophic vaginitis without satisfactory control of symptoms I recommend that she be seen by women's care for further evaluation and treatment plan development.  Patient is agreeable to this plan.  At the end of the encounter, I discussed results, diagnosis, medications. Discussed red flags for immediate return to clinic/ER, as well as indications for follow up if no improvement. Patient understood and agreed to plan. Patient was stable for discharge.    1. Vaginal itching  Urinalysis-UC if Indicated    Wet Prep, Vaginal         Patient Instructions   1) Follow up with [facility] women's care or with your personal OB/GYN. The number for women's care appointments is [phone].  2) Currently your wet prep is negative and you're urine test does not show signs of urinary tract infection.   3) While you are waiting to follow up with women's care please continue using Replens and Estrace.

## 2021-05-31 ENCOUNTER — RECORDS - HEALTHEAST (OUTPATIENT)
Dept: ADMINISTRATIVE | Facility: CLINIC | Age: 81
End: 2021-05-31

## 2021-05-31 VITALS — WEIGHT: 125 LBS | BODY MASS INDEX: 21.34 KG/M2 | HEIGHT: 64 IN

## 2021-05-31 VITALS — WEIGHT: 125 LBS | BODY MASS INDEX: 21.62 KG/M2

## 2021-05-31 VITALS — WEIGHT: 125.2 LBS | BODY MASS INDEX: 21.66 KG/M2

## 2021-05-31 VITALS — BODY MASS INDEX: 21.07 KG/M2 | HEIGHT: 64 IN | WEIGHT: 123.4 LBS

## 2021-06-01 ENCOUNTER — RECORDS - HEALTHEAST (OUTPATIENT)
Dept: ADMINISTRATIVE | Facility: CLINIC | Age: 81
End: 2021-06-01

## 2021-06-01 VITALS — WEIGHT: 126 LBS | BODY MASS INDEX: 21.8 KG/M2

## 2021-06-01 VITALS — BODY MASS INDEX: 21.8 KG/M2 | WEIGHT: 126 LBS

## 2021-06-01 NOTE — PATIENT INSTRUCTIONS - HE
Please follow up if you have any further issues.    You may contact me by phone or [email or web address] if you are worsening or if things are not improving.    Thank you for coming in today.

## 2021-06-01 NOTE — PROGRESS NOTES
Wt Readings from Last 20 Encounters:   09/16/19 122 lb (55.3 kg)   06/25/19 122 lb 12.8 oz (55.7 kg)   03/18/19 125 lb (56.7 kg)   01/17/19 127 lb (57.6 kg)   12/26/18 127 lb 4.8 oz (57.7 kg)   10/30/18 128 lb (58.1 kg)   10/26/18 128 lb (58.1 kg)   05/22/18 126 lb (57.2 kg)   04/24/18 126 lb (57.2 kg)   01/02/18 125 lb (56.7 kg)   12/11/17 125 lb 3.2 oz (56.8 kg)   10/30/17 123 lb 6.4 oz (56 kg)   10/24/17 125 lb (56.7 kg)   04/21/17 125 lb 9.6 oz (57 kg)   03/16/17 128 lb (58.1 kg)   02/09/17 129 lb (58.5 kg)   01/06/17 130 lb (59 kg)   12/29/16 130 lb (59 kg)   10/21/16 126 lb 3.2 oz (57.2 kg)   09/28/16 125 lb (56.7 kg)

## 2021-06-02 VITALS — BODY MASS INDEX: 21.85 KG/M2 | HEIGHT: 64 IN | WEIGHT: 128 LBS

## 2021-06-02 VITALS — WEIGHT: 127 LBS | HEIGHT: 64 IN | BODY MASS INDEX: 21.68 KG/M2

## 2021-06-02 VITALS — BODY MASS INDEX: 21.34 KG/M2 | WEIGHT: 125 LBS | HEIGHT: 64 IN

## 2021-06-02 VITALS — WEIGHT: 127.3 LBS | BODY MASS INDEX: 22.02 KG/M2

## 2021-06-02 VITALS — BODY MASS INDEX: 22.14 KG/M2 | WEIGHT: 128 LBS

## 2021-06-03 VITALS
SYSTOLIC BLOOD PRESSURE: 128 MMHG | DIASTOLIC BLOOD PRESSURE: 76 MMHG | HEART RATE: 52 BPM | BODY MASS INDEX: 21.11 KG/M2 | WEIGHT: 122 LBS | OXYGEN SATURATION: 97 %

## 2021-06-03 VITALS — WEIGHT: 122.8 LBS | BODY MASS INDEX: 21.24 KG/M2

## 2021-06-08 NOTE — PROGRESS NOTES
Chief complaint: Allergy follow-up    History of present illness: This is a pleasant 76-year-old woman with a history of allergic rhinitis and intermittent asthma here for follow-up visit.  She believes her Flonase is causing some hoarseness.  She states she always has a runny nose.  She has used the Flonase more intermittently rather than regularly.  She is wondering if there may be other options for her nasal congestion.  She reports her breathing is been well controlled.  No cough, wheeze or shortness of breath.    She does have a history of penicillin allergy.  She would like to discuss that today as well.  She states many years ago she had a rash and some numbness of her fingers and toes after taking penicillin.  No blistering.  No skin sloughing.  She was not hospitalized.    Past medical history, social history, family medical history, meds and allergies reviewed and updated accordingly.      Review of Systems performed as above and the remainder is negative.        Current Outpatient Prescriptions:      albuterol (PROAIR HFA) 90 mcg/actuation inhaler, Inhale 2 puffs every 4 (four) hours as needed for wheezing., Disp: 1 Inhaler, Rfl: 0     calcium citrate-vitamin D (CITRACAL+D) 315-200 mg-unit per tablet, Take 2 tablets by mouth 2 (two) times a day., Disp: , Rfl:      citalopram (CELEXA) 20 MG tablet, Take 1 tablet (20 mg total) by mouth daily., Disp: 90 tablet, Rfl: 3     estradiol (ESTRACE) 0.01 % (0.1 mg/gram) vaginal cream, APPLY ONE GRAM 3 DAYS PER WEEK VAGINALLY, Disp: 85 g, Rfl: 3     fluticasone (FLONASE) 50 mcg/actuation nasal spray, USE TWO SPRAYS IN EACH NOSTRIL EVERY DAY, Disp: 16 g, Rfl: 3     loratadine (CLARITIN) 10 mg tablet, Take 10 mg by mouth daily., Disp: , Rfl:      metoprolol succinate (TOPROL-XL) 50 MG 24 hr tablet, Take 1 tablet at bedtime, Disp: 90 tablet, Rfl: 3     multivitamin with minerals (THERA-M) 9 mg iron-400 mcg Tab tablet, Take 1 tablet by mouth daily., Disp: , Rfl:       OMEGA-3/DHA/EPA/FISH OIL (FISH OIL-OMEGA-3 FATTY ACIDS) 300-1,000 mg capsule, Take 2 g by mouth daily., Disp: , Rfl:      RESTASIS 0.05 % ophthalmic emulsion, INSTILL ONE DROP IN EACH EYE TWO TIMES A DAY, Disp: 180 each, Rfl: 3     ketoconazole (NIZORAL) 2 % cream, Apply sparingly to rash twice a day, Disp: 30 g, Rfl: 0     triamcinolone (KENALOG) 0.5 % ointment, Apply sparingly to rash twice a day, Disp: 30 g, Rfl: 0    Allergies   Allergen Reactions     Penicillins Hives     Codeine Nausea And Vomiting     Levofloxacin Myalgia     Shoulder pain     Sulfa (Sulfonamide Antibiotics) Itching     Tetanus Toxoid Fluid      Allergy was to the horse serum type, has had a tetanus shot since then with no reaction       Visit Vitals     /74     Pulse 60     Wt 129 lb (58.5 kg)     LMP  (LMP Unknown)     BMI 21.8 kg/m2     Gen:  Pleasant female not in acute distress  HEENT:  Eyes no erythema of the bulbar or palpebral conjunctiva, no edema.  Ears:  TMs well visualized, no effusions.  Nose:  Clear mucus drainage Mouth:  Throat clear, no lip or tongue edema.    Cardiac:  Regular rate and rhythm, no murmurs, rubs or gallops  Respiratory:  Clear to auscultation bilaterally, no adventitious breath sounds    Skin:  No rashes or lesions, dermatographia  Psych:  Alert and oriented times 3    Impression report and plan:  1.  Allergic rhinitis  2.  Intermittent asthma  3.  Drug allergy    I would recommend penicillin skin testing.  She has been on her antihistamine today.  We did set up a follow-up appointment for March for skin testing.  She does have some itching of her back.  For this reason I stated that she can use Claritin twice daily if needed.  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  I would recommend using Flonase regularly.  We could also add Astelin nasal spray as needed.  Follow for penicillin skin testing.

## 2021-06-08 NOTE — PROGRESS NOTES
HPI:  Recent sinus infection.  She also had cerumen impaction that was removed.  She feels like she is still symptomatic.  Left bloody mucous.  She notes infections happen once per year.      Past medical history, surgical history, social history, family history, medications, and allergies have been reviewed with the patient and are documented above.    Review of Systems: a 10-system review was performed. Pertinent positives are noted in the HPI and on a separate scanned document in the chart.    PHYSICAL EXAMINATION:  GEN: no acute distress, normocephalic  EYES: extraocular movements are intact, pupils are equal and round. Sclera clear.   EARS: auricles are normally formed. The external auditory canals are clear with minimal to no cerumen. Tympanic membranes are intact bilaterally with no signs of infection, effusion, retractions, or perforations.  NOSE: anterior nares are patent. There are no masses or lesions. The septum is non-obstructing.  OC/OP: clear, dentition is in good repair. The tongue and palate are fully mobile and symmetric. The floor of mouth, base of tongue, and tonsils are soft and symmetric.  NECK: soft and supple. No lymphadenopathy or masses. Airway is midline.  NEURO: CN II-XII are intact bilaterally. alert and oriented. No nystagmus. Gait is normal.  PULM: breathing comfortably on room air, normal chest expansion with respiration  HEART: regular rate and rhythm, no peripheral edema    MEDICAL DECISION-MAKING:     Recurrent sinus infections - still symptomatic - Will repeat Azithromycin given allergies to medications.  SHe has small amount of wax but we agreed to observe for now.   Bilateral SNHL - Hearing aids are an option but she would like to defer for now.

## 2021-06-08 NOTE — PROGRESS NOTES
Audiology only; scheduled to see ENT ([facility]) 1-6-17    History:  Please see chart notes/results dated 8-5-13, 12-6-13, & 10-9-15 for background information. Patient continues to report intermittent otalgia in right ear only, with ongoing sinus and throat complaints. She denied tinnitus, aural fullness, dizziness, otorrhea, or significant noise exposure.    Results:  Otoscopy revealed non-occluding cerumen, bilaterally; tympanometry was performed first to verify ear canal patency for testing.  Hearing sensitivity was assessed with good reliability using both insert and circumaural phones.      Right ear:   Mild, sloping to profound mixed hearing loss for 8786-6795 Hz; some canal collapse is suspected, which may be contributing mild conductive components to results. Various transducers were used in an attempt to eliminate air/bone gaps. Shifts of 15-35 dB were noted for both air and bone conduction thresholds in the right ear, per 10-9-15 audiogram.  Left ear:  Mild, sloping to moderate-severe sensorineural hearing loss for 5654-5696 Hz.  Mali was negative.      Speech reception thresholds showed agreement with pure tone findings in each ear. Word recognition ability was excellent, bilaterally, with presentation levels above typical conversational volume in both ears.    Tympanometry was consistent with normal middle ear function and ear canal volumes, bilaterally.    Recommendations:  Follow-up with ENT as scheduled 1-6-17; retest hearing annually (to monitor) or per medical management.  Wear hearing protection consistently in noise.  Neena Castellano is a potential binaural amplification candidate, if patient motivation exists and medical clearance is granted.    Roshni Galarza, [facility]  [place] Licensed Audiologist [id number]

## 2021-06-09 NOTE — PROGRESS NOTES
Chief complaint: Penicillin allergy    History of present illness: This is a pleasant 76-year-old woman I have seen previously for allergies who is here today to discuss penicillin allergy.  She states when she had delivered her second child, she developed mastitis.  She required a shot of penicillin.  About a day after the shot, she developed red blotchy skin and some numbness of her toes and fingers.  No blistering.  No mucosal lesions.  She does not recall any shortness of breath.  Since that time, she has been labeled with penicillin allergy.  She has not had cephalosporins to her knowledge.  She reports she is feeling well today.      Past medical history, social history, family medical history, meds and allergies reviewed and updated accordingly.      Review of Systems performed as above and the remainder is negative.        Current Outpatient Prescriptions:      albuterol (PROAIR HFA) 90 mcg/actuation inhaler, Inhale 2 puffs every 4 (four) hours as needed for wheezing., Disp: 1 Inhaler, Rfl: 0     calcium citrate-vitamin D (CITRACAL+D) 315-200 mg-unit per tablet, Take 2 tablets by mouth 2 (two) times a day., Disp: , Rfl:      citalopram (CELEXA) 20 MG tablet, Take 1 tablet (20 mg total) by mouth daily., Disp: 90 tablet, Rfl: 3     estradiol (ESTRACE) 0.01 % (0.1 mg/gram) vaginal cream, APPLY ONE GRAM 3 DAYS PER WEEK VAGINALLY, Disp: 85 g, Rfl: 3     KETOTIFEN FUMARATE (ALAWAY OPHT), Apply 1 drop to eye daily., Disp: , Rfl:      metoprolol succinate (TOPROL-XL) 50 MG 24 hr tablet, Take 1 tablet at bedtime, Disp: 90 tablet, Rfl: 3     multivitamin with minerals (THERA-M) 9 mg iron-400 mcg Tab tablet, Take 1 tablet by mouth daily., Disp: , Rfl:      RESTASIS 0.05 % ophthalmic emulsion, INSTILL ONE DROP IN EACH EYE TWO TIMES A DAY, Disp: 180 each, Rfl: 3     triamcinolone (KENALOG) 0.5 % ointment, Apply sparingly to rash twice a day, Disp: 30 g, Rfl: 0     azithromycin (ZITHROMAX Z-MARIA ISABEL) 250 MG tablet, Take 1  "tablet (250 mg total) by mouth daily for 5 days. Take 500 mg (2 x 250 mg tablets) on day 1 followed by 250 mg (1 tablet) on days 2-5., Disp: 6 tablet, Rfl: 0     fluticasone (FLONASE) 50 mcg/actuation nasal spray, USE TWO SPRAYS IN EACH NOSTRIL EVERY DAY, Disp: 16 g, Rfl: 3     ketoconazole (NIZORAL) 2 % cream, Apply sparingly to rash twice a day, Disp: 30 g, Rfl: 0     loratadine (CLARITIN) 10 mg tablet, Take 10 mg by mouth daily., Disp: , Rfl:     Allergies   Allergen Reactions     Penicillins Hives     Codeine Nausea And Vomiting     Levofloxacin Myalgia     Shoulder pain     Sulfa (Sulfonamide Antibiotics) Itching     Tetanus Toxoid Fluid      Allergy was to the horse serum type, has had a tetanus shot since then with no reaction       Visit Vitals     /66     Ht 5' 3.75\" (1.619 m)     Wt 128 lb (58.1 kg)     LMP  (LMP Unknown)     BMI 22.14 kg/m2     Gen:  Pleasant female not in acute distress  HEENT:  Eyes no erythema of the bulbar or palpebral conjunctiva, no edema.   Mouth:  Throat clear, no lip or tongue edema.      Skin:  No rashes or lesions  Psych:  Alert and oriented times 3    Last Penicillin (drug) Allergy Test Results  Antibiotics  Pre Pen Prick  (W/F in mm): 0-0 (03/16/17 1036)  Pre Pen Intradermal #1  (W/F in mm): 0-0 (03/16/17 1036)  Pre Pen Intradermal #2  (W/F in MM): 0-0 (03/16/17 1036)  Penicillin G (10,000 u/ml) Prick  (W/F in mm): 0-0 (03/16/17 1036)  Penicillin G (10,000 u/ml) Intradermal #1 (W/F in mm): 0-0 (03/16/17 1036)  Penicillin G (10,000 u/ml) Intradermal #2  (W/F in mm): 0-0 (03/16/17 1036)  Controls  Device Type: QUINTIP (03/16/17 1036)  Prick Neg. 50% Control: Glycerine-Saline H (W/F in mm): 0-0 (03/16/17 1036)  Prick Pos. Control: Histamine 6 mg/ml (W/F in mm): 5-10 (03/16/17 1036)  Intradermal Neg. 50% Control: Glycerine-Saline H (W/F in mm): 0-0 (03/16/17 1036)              Impression report and plan:    1.  Penicillin allergy    This patient has a 2-6% chance of having " an IgE mediated reaction to penicillin antibiotic.  This is not predict for non-IgE mediated reactions.  If she has a reaction future, she should let me know.  Follow as needed.    Time spent with the patient 25 minutes, greater than half was spent counseling and coordination of care regarding penicillin allergy.

## 2021-06-13 NOTE — PROGRESS NOTES
HPI:   Neena Castellano is a 77 y.o. female referred to see me by Zach Canseco MD for a right arm mass.  The patient notes that it has been present for approximately 10 years.  It is slowly been growing over time.  She denies any drainage or irritating symptoms around it.  She describes the mass as soft and mobile.    Allergies:  Codeine; Levofloxacin; Sulfa (sulfonamide antibiotics); and Tetanus toxoid fluid    Past Medical History:   Diagnosis Date     Anxiety      AR (allergic rhinitis)      Atrophic vaginitis      DNR (do not resuscitate) 10/25/2017     ETD (eustachian tube dysfunction)      Former smoker      GERD (gastroesophageal reflux disease)      HLD (hyperlipidemia)      HTN (hypertension)      Mild intermittent asthma      OA (osteoarthritis) 10/21/2016     Rotator cuff tendonitis      Varicose vein        Past Surgical History:   Procedure Laterality Date     BREAST BIOPSY Left     benign     BUNIONECTOMY Right 11/7/2014    Procedure: RIGHT 1ST METATARSAL PHALANGEAL JOINT FUSION ;  Surgeon: Washington Blue DPM;  Location: [facility];  Service:      CATARACT EXTRACTION, BILATERAL       HYSTERECTOMY  1993     OOPHORECTOMY  1993     RI REPAIR OF HAMMERTOE,ONE Right 11/7/2014    Procedure: RIGHT 2ND HAMMERTOE CORRECTION;  Surgeon: Washington Blue DPM;  Location: [facility];  Service: Podiatry       CURRENT MEDS:    Current Outpatient Prescriptions:      albuterol (PROAIR HFA) 90 mcg/actuation inhaler, Inhale 2 puffs every 4 (four) hours as needed for wheezing., Disp: 1 Inhaler, Rfl: 0     calcium citrate-vitamin D (CITRACAL+D) 315-200 mg-unit per tablet, Take 2 tablets by mouth 2 (two) times a day., Disp: , Rfl:      citalopram (CELEXA) 20 MG tablet, Take 1 tablet (20 mg total) by mouth daily., Disp: 90 tablet, Rfl: 3     cycloSPORINE (RESTASIS) 0.05 % ophthalmic emulsion, INSTILL ONE DROP IN EACH EYE TWO TIMES A DAY, Disp: 180 each, Rfl: 3     estradiol (ESTRACE) 0.01 % (0.1 mg/gram) vaginal  cream, APPLY ONE GRAM 3 DAYS PER WEEK VAGINALLY (Patient taking differently: 1 g. APPLY ONE GRAM 3 DAYS PER WEEK VAGINALLY), Disp: 85 g, Rfl: 3     fluticasone (FLONASE) 50 mcg/actuation nasal spray, USE TWO SPRAYS IN EACH NOSTRIL EVERY DAY, Disp: 16 g, Rfl: 3     ketoconazole (NIZORAL) 2 % cream, Apply sparingly to rash twice a day, Disp: 30 g, Rfl: 0     KETOTIFEN FUMARATE (ALAWAY OPHT), Apply 1 drop to eye daily., Disp: , Rfl:      loratadine (CLARITIN) 10 mg tablet, Take 10 mg by mouth daily., Disp: , Rfl:      metoprolol succinate (TOPROL-XL) 50 MG 24 hr tablet, Take 1 tablet at bedtime, Disp: 90 tablet, Rfl: 3     multivitamin with minerals (THERA-M) 9 mg iron-400 mcg Tab tablet, Take 1 tablet by mouth daily., Disp: , Rfl:      triamcinolone (KENALOG) 0.1 % cream, Apply topically 2 (two) times a day., Disp: 454 g, Rfl: 4     triamcinolone (KENALOG) 0.5 % ointment, Apply sparingly to rash twice a day, Disp: 30 g, Rfl: 0    Family history:  Family History   Problem Relation Age of Onset     Ovarian cancer Mother 83     No Medical Problems Father      No Medical Problems Sister      No Medical Problems Daughter      No Medical Problems Son      No Medical Problems Daughter      BRCA 1/2 Neg Hx      Breast cancer Neg Hx      Cancer Neg Hx      Colon cancer Neg Hx      Endometrial cancer Neg Hx        Social history:   reports that she quit smoking about 28 years ago. Her smoking use included Cigarettes. She has a 20.00 pack-year smoking history. She has never used smokeless tobacco. She reports that she does not drink alcohol or use illicit drugs.    Review of Systems:  General: No complaints or constitutional symptoms  Skin: Right forearm mass  Hematologic/Lymphatic: No symptoms or complaints  Psychiatric: No symptoms or complaints  Endocrine: No excessive fatigue, no hypermetabolic symptoms reported  Respiratory: No cough, shortness of breath, or wheezing  Cardiovascular: No chest pain or dyspnea on  "exertion  Gastrointestinal: No abdominal pain, nausea, diarrhea  Musculoskeletal: No recent injuries reported  Neurological: No focal neurologic defects reported  Breast: No discharge, skin changes, or palpable masses    EXAM:  /54 (Patient Site: Right Arm, Patient Position: Sitting, Cuff Size: Adult Regular)  Pulse (!) 59  Ht 5' 3.75\" (1.619 m)  Wt 123 lb 6.4 oz (56 kg)  LMP  (LMP Unknown)  SpO2 100%  Breastfeeding? No  BMI 21.35 kg/m2  Body mass index is 21.35 kg/(m^2).  General : Alert, cooperative, appears stated age   Skin: Skin color, texture, turgor normal, approximate 2 cm dorsal aspect mid forearm soft mobile mass consistent with a lipoma  Lymphatic: No obvious adenopathy, no swelling   Eyes: No scleral icterus, pupils equal  HENT: no traumatic injury to the head or face, no gross abnormalities  Lungs: Normal respiratory effort, breath sounds equal bilaterally  Heart: Regular rate and rhythm  Abdomen: Soft and nontender  Musculoskeletal: No obvious swelling  Neurologic: Grossly intact    LABS:  Lab Results   Component Value Date    WBC 5.4 10/24/2017    WBC 9.8 07/06/2015    HGB 13.5 10/24/2017    HCT 38.7 10/24/2017    MCV 91 10/24/2017     10/24/2017       Results from last 7 days  Lab Units 10/24/17  1145   LN-SODIUM mmol/L 142   LN-POTASSIUM mmol/L 4.5   LN-CHLORIDE mmol/L 103   LN-CO2 mmol/L 29   LN-BLOOD UREA NITROGEN mg/dL 19   LN-CREATININE mg/dL 0.81   LN-CALCIUM mg/dL 10.2     Lab Results   Component Value Date    ALT 30 10/24/2017    AST 43 (H) 10/24/2017    ALKPHOS 63 10/24/2017    BILITOT 0.8 10/24/2017       Assessment/Plan:   1. Arm mass, right        Neena Castellano is a 77 y.o. female with signs and symptoms consistent with a right forearm lipoma.  I have explained the pathophysiology of lipomas in detail as well as the surgical versus non-operative management strategies.  She would like for it to be removed.  It was discussed that this could be done in the office " today.  The mass excision was performed.  A Steri-Strip was placed over the incision.  She can shower over the incision tomorrow.  She can remove the Steri-Strips in 1 week.  The mass was sent for pathology.  She will receive a phone call if there are any concerns regarding the pathology.  She can follow-up in the clinic as needed.      Procedure:  After informed consent was obtained, the right forearm was prepped and draped in the usual sterile fashion.  5 mL of 1% lidocaine with epinephrine was injected circumferentially around this 2 cm soft mobile mass.  An elliptical incision was made over the apex of the mass in order to include a mole over the mass.  Once the subcutaneous tissue was entered, the mass was easily expressed from the tissues.  The mass measured approximately 2 cm.  It appeared to be a lipoma.  The mass and overlying elliptical skin incision were sent off as a specimen.  Hemostasis was assured.  The incision was closed in an interrupted fashion using 4-0 Vicryl and dressed with a Steri-Strip.      Libia Wallace, [facility]  [phone]

## 2021-06-14 NOTE — PROGRESS NOTES
[facility] Clinic Note  Patient Name: Neena Castellano  Patient Age: 77 y.o.  YOB: 1940  MRN: [id number]  ?  Date of Visit: 12/11/2017  Reason for Office Visit:   Chief Complaint   Patient presents with     Nasal Congestion     x 2 month      HPI: Neena Castellano 77 y.o. who presents to clinic for sinus drainage, and facial pain, congestion, right ear pain, sore throat. Pain in deep frontal sinuses. She usually gets this once a year and has seen allergist and ENT in the past. She has been using flonase and claritin daily. Mucus is yellow/green She has eustachian tube dysfunction on the right. No systemic ss.     Review of Systems: As noted in HPI     Current Scheduled Meds:  Outpatient Encounter Prescriptions as of 12/11/2017   Medication Sig Dispense Refill     albuterol (PROAIR HFA) 90 mcg/actuation inhaler Inhale 2 puffs every 4 (four) hours as needed for wheezing. 1 Inhaler 0     calcium citrate-vitamin D (CITRACAL+D) 315-200 mg-unit per tablet Take 2 tablets by mouth 2 (two) times a day.       citalopram (CELEXA) 20 MG tablet Take 1 tablet (20 mg total) by mouth daily. 90 tablet 3     cycloSPORINE (RESTASIS) 0.05 % ophthalmic emulsion INSTILL ONE DROP IN EACH EYE TWO TIMES A  each 3     erythromycin ophthalmic ointment APPLY A SMALL AMOUNT IN BOTH EYES ONCE IN THE EVENING  2     estradiol (ESTRACE) 0.01 % (0.1 mg/gram) vaginal cream APPLY ONE GRAM 3 DAYS PER WEEK VAGINALLY (Patient taking differently: 1 g. APPLY ONE GRAM 3 DAYS PER WEEK VAGINALLY) 85 g 3     fluticasone (FLONASE) 50 mcg/actuation nasal spray USE TWO SPRAYS IN EACH NOSTRIL EVERY DAY 16 g 3     ketoconazole (NIZORAL) 2 % cream Apply sparingly to rash twice a day 30 g 0     KETOTIFEN FUMARATE (ALAWAY OPHT) Apply 1 drop to eye daily.       loratadine (CLARITIN) 10 mg tablet Take 10 mg by mouth daily.       metoprolol succinate (TOPROL-XL) 50 MG 24 hr tablet Take 1 tablet at bedtime 90 tablet 3     multivitamin with  minerals (THERA-M) 9 mg iron-400 mcg Tab tablet Take 1 tablet by mouth daily.       triamcinolone (KENALOG) 0.1 % cream Apply topically 2 (two) times a day. 454 g 4     triamcinolone (KENALOG) 0.5 % ointment Apply sparingly to rash twice a day 30 g 0     azithromycin (ZITHROMAX Z-MARIA ISABEL) 250 MG tablet Take 2 tablets (500 mg) on  Day 1,  followed by 1 tablet (250 mg) once daily on Days 2 through 5. 6 tablet 0     No facility-administered encounter medications on file as of 12/11/2017.        Objective / Physical Examination:  /60  Pulse 64  Temp 98.2  F (36.8  C) (Oral)   Wt 125 lb 3.2 oz (56.8 kg)  LMP  (LMP Unknown)  SpO2 96%  BMI 21.66 kg/m2  Wt Readings from Last 3 Encounters:   12/11/17 125 lb 3.2 oz (56.8 kg)   10/30/17 123 lb 6.4 oz (56 kg)   10/24/17 125 lb (56.7 kg)     Body mass index is 21.66 kg/(m^2). (>25?)    General Appearance: Alert and oriented in no acute distress  Head: some tenderness to percussion over frontal and sphenoid sinuses  Ears: left ear impacted with cerumen. Right ear clear  Eyes: Conjunctivae clear   Nose: Septum midline, mucosa moist and without drainage  Throat: Lips and mucosa moist. pharynx without erythema or exudate  Neck: Supple, trachea midline. No cervical adenopathy.   Lungs: Clear to auscultation bilaterally. Normal inspiratory and expiratory effort. No w/r/r  Cardiovascular: RRR   Neuro: Alert and oriented, follows commands appropriately.     Assessment / Plan / Medical Decision Making:      Encounter Diagnoses   Name Primary?     Acute frontal sinusitis, recurrence not specified Yes     Impacted cerumen of left ear         1. Acute frontal sinusitis, recurrence not specified  She reports a history of PCN allergy as a kid but allergy testing recently disproved this, however she would rather stay away from PCN if there are other options.   We will start her on a 5 day course of azithro  Continue sinus rinses, steam therapy, warm compresses may help     -  azithromycin (ZITHROMAX Z-MARIA ISABEL) 250 MG tablet; Take 2 tablets (500 mg) on  Day 1,  followed by 1 tablet (250 mg) once daily on Days 2 through 5.  Dispense: 6 tablet; Refill: 0    Follow up if not improving or symptoms worsen     Total time spent with patient was 15 minutes with >50% of time spent in face-to-face counseling regarding the above plan     Tank Puente MD  [facility]

## 2021-06-15 NOTE — PROGRESS NOTES
[facility] Clinic Note  Patient Name: Neena Castellano  Patient Age: 77 y.o.  YOB: 1940  MRN: [id number]  ?  Date of Visit: 1/2/2018  Reason for Office Visit:   Chief Complaint   Patient presents with     Facial Pain     Was seen for a sinus issue back in December by Dr. Puente. Was given a z-pack and that did not make it go away. She is still having issues. Facial pain, pressure, headache.        HPI: Neena Castellano 77 y.o. who presents to clinic for ongoing sinus infection. She was seen in early December for this problem and started on a course of Zithromax. She finished the course and was doing rinses however there has been little improvement. She still has facial pain, pressure, headaches, right ear pain, dry cough. No fevers. Has been doing humidifier as well.       Review of Systems: As noted in HPI     Current Scheduled Meds:  Outpatient Encounter Prescriptions as of 1/2/2018   Medication Sig Dispense Refill     albuterol (PROAIR HFA) 90 mcg/actuation inhaler Inhale 2 puffs every 4 (four) hours as needed for wheezing. 1 Inhaler 6     calcium citrate-vitamin D (CITRACAL+D) 315-200 mg-unit per tablet Take 2 tablets by mouth 2 (two) times a day.       citalopram (CELEXA) 20 MG tablet Take 1 tablet (20 mg total) by mouth daily. For anxiety. 90 tablet 3     cycloSPORINE (RESTASIS) 0.05 % ophthalmic emulsion INSTILL ONE DROP IN EACH EYE TWO TIMES A  each 3     erythromycin ophthalmic ointment APPLY A SMALL AMOUNT IN BOTH EYES ONCE IN THE EVENING  2     estradiol (ESTRACE) 0.01 % (0.1 mg/gram) vaginal cream APPLY ONE GRAM 3 DAYS PER WEEK VAGINALLY (Patient taking differently: 1 g. APPLY ONE GRAM 3 DAYS PER WEEK VAGINALLY) 85 g 3     fluticasone (FLONASE) 50 mcg/actuation nasal spray USE TWO SPRAYS IN EACH NOSTRIL EVERY DAY.  For allergies 16 g 6     ketoconazole (NIZORAL) 2 % cream Apply sparingly to rash twice a day 30 g 0     KETOTIFEN FUMARATE (ALAWAY OPHT) Apply 1 drop to eye  "daily.       loratadine (CLARITIN) 10 mg tablet Take 10 mg by mouth daily.       metoprolol succinate (TOPROL-XL) 50 MG 24 hr tablet Take 1 tablet at bedtime.  For high blood pressure. 90 tablet 3     multivitamin with minerals (THERA-M) 9 mg iron-400 mcg Tab tablet Take 1 tablet by mouth daily.       triamcinolone (KENALOG) 0.1 % cream Apply topically 2 (two) times a day. 454 g 4     triamcinolone (KENALOG) 0.5 % ointment Apply sparingly to rash twice a day 30 g 0     amoxicillin-clavulanate (AUGMENTIN) 875-125 mg per tablet Take 1 tablet by mouth 2 (two) times a day for 10 days. 20 tablet 0     predniSONE (DELTASONE) 20 MG tablet Take 1 tablet (20 mg total) by mouth daily. 5 tablet 0     No facility-administered encounter medications on file as of 1/2/2018.        Objective / Physical Examination:  /62  Pulse 60  Ht 5' 3.75\" (1.619 m)  Wt 125 lb (56.7 kg)  LMP  (LMP Unknown)  BMI 21.62 kg/m2  Wt Readings from Last 3 Encounters:   01/02/18 125 lb (56.7 kg)   12/11/17 125 lb 3.2 oz (56.8 kg)   10/30/17 123 lb 6.4 oz (56 kg)     Body mass index is 21.62 kg/(m^2). (>25?)    General Appearance: Alert and oriented in no acute distress  Head: max and ethmoid sinuses tender to percussion  Ears: Tympanic membrane clear with landmarks well visualized bilaterally  Eyes:  Conjunctivae clear   Nose: Septum midline, nares patent,  mucosa moist and without drainage  Throat: Lips and mucosa moist.  pharynx without erythema or exudate  Neck: Supple, trachea midline. No cervical adenopathy.  Lungs: Clear to auscultation bilaterally. Normal inspiratory and expiratory effort. No w/r/r  Cardiovascular: RRR   Integumentary: Warm and dry.    Assessment / Plan / Medical Decision Making:      Encounter Diagnoses   Name Primary?     Chronic ethmoidal sinusitis Yes        1. Chronic ethmoidal sinusitis      Consistent with sinusitis, failed therapy initially with Zithromax  Will try a short prednisone course and switch to " Augmentin bid x 10 days  Continue with sinus rinses    - predniSONE (DELTASONE) 20 MG tablet; Take 1 tablet (20 mg total) by mouth daily.  Dispense: 5 tablet; Refill: 0  - amoxicillin-clavulanate (AUGMENTIN) 875-125 mg per tablet; Take 1 tablet by mouth 2 (two) times a day for 10 days.  Dispense: 20 tablet; Refill: 0    Return if symptoms worsen or fail to improve.     Total time spent with patient was 15 minutes with >50% of time spent in face-to-face counseling regarding the above plan     Tank Puente MD  [facility]

## 2021-06-16 PROBLEM — Z66 DNR (DO NOT RESUSCITATE): Chronic | Status: ACTIVE | Noted: 2017-10-25

## 2021-06-17 ENCOUNTER — TELEPHONE (OUTPATIENT)
Dept: NEUROSURGERY | Facility: CLINIC | Age: 81
End: 2021-06-17

## 2021-06-17 NOTE — PATIENT INSTRUCTIONS - HE
Patient Instructions by Zach Canseco MD at 3/18/2019  9:40 AM     Author: Zach Canseco MD Service: -- Author Type: Physician    Filed: 3/18/2019  9:51 AM Encounter Date: 3/18/2019 Status: Signed    : Zach Canseco MD (Physician)       The new shingles shot (Shingrix) is 2 separate shots  by 2-6 months.    The cost out of pocket is around $390 for the 2 shots, so you might want to see if your insurance covers it or a portion of it prior to having it done.  Often by having it done at a pharmacy rather than a clinic, it can be cheaper for you.    It is estimated that any person's risk of shingles over their lifetime is around 10-20%.    The old shingles vaccine (Zostavax) is about 50% effective, reducing your risk of shingles to about 5-10% over your lifetime.    The new shot is 90% effective, reducing your risk of shingles to about 1-2% over your lifetime.    Because the shot is strong, it is very common to have flu like symptoms for 2-3 days after the shot.  25-50% of patients will have fever, muscle aches or headache.    I believe that whether or not you have this vaccine is your own decision depending on your values and preferences.  The information above I give you to make an informed decision.    Zach Canseco MD  [facility]    ______________________________________________________________________      Advance Medical Directive    An advance medical directive is a form that lets you plan ahead for the care youd want if you could no longer express your wishes. This statement outlines the medical treatment youd want or names the person youd wish to make healthcare decisions for you. Be aware that laws vary from state to state, and it may be worthwhile to talk with an .  Writing down your wishes    An advance directive is important whether youre young or old. Injury or illness can strike at any age.    Decide what is important to you and the kind of  treatment youd want, or not want to have.    Some states allow only one kind of advance directive. Some let you do both a Durable Power of  for Health Care and a Living Will. Some states put both kinds on the same form.  A Durable Power of  for Health Care    This form lets you name someone else to be your agent.    This person can decide on treatment for you only when you cant speak for yourself.    You do not need to be at the end of your life. He or she could speak for you if you were in a coma but were likely to recover.  A Living Will    This form lets you list the care you want at the end of your life.    A living will applies only if you wont live without medical treatment. It would apply if you had advanced cancer, a massive stroke, or other serious illness from which you will not recover.    It takes effect only when you can no longer express your wishes yourself.  Date Last Reviewed: 2/13/2016 2000-2017 [employer]. [address] [place] [address], [place] [address]. All rights reserved. This information is not intended as a substitute for professional medical care. Always follow your healthcare professional's instructions.  All

## 2021-06-17 NOTE — TELEPHONE ENCOUNTER
Writer NIGEL for pt to call back and schedule follow up with Dr. Polanco    Please schedule the next available virtual visit with Dr. Polanco. To schedule you will need to use Dept:  ONCOLOGY ADULT [[id number]] and the second option for Dr. Polanco (in [facility])    Nina Smallwood

## 2021-06-17 NOTE — TELEPHONE ENCOUNTER
[facility] Call Center    Phone Message    May a detailed message be left on voicemail: yes     Reason for Call: Other: Pt called to schedule follow up with Dr. Polanco. Please call Pt back to schedule.    Action Taken: Message routed to:  [facility] Neurosurgery    Travel Screening: Not Applicable

## 2021-06-17 NOTE — TELEPHONE ENCOUNTER
[facility] Call Center    Phone Message    May a detailed message be left on voicemail: yes     Reason for Call: Other: Lynsey returning call. (I'm unable to pull Dr. Polanco's schedule) Pt would like to schedule in person apt with Dr. Polanco. Please call pt back to schedule follow up.      Action Taken: Message routed to:  [facility] ([facility]  neurosurg    Travel Screening: Not Applicable

## 2021-06-18 NOTE — PROGRESS NOTES
Internal Medicine Office Visit  [facility]  Patient Name: Neena Castellano  Patient Age: 77 y.o.  YOB: 1940  MRN: [id number]    Date of Visit: 2018  Reason for Office Visit:   Chief Complaint   Patient presents with     Vaginal Pain           Assessment / Plan / Medical Decision Makin. Vaginitis  2. Atrophic vaginitis  - Pelvic exam does not reveal any obvious lesions/erythema/sores. Suspect contact dermatitis related to use of scented bubble bath product. She is advised to avoid using any soaps in the perineal area for the next 2 weeks and cleanse only with water. No bubble baths, if she wishes to take baths she is advised against using any products in the water and shower after the bath. She can continue Estrace as currently directed. Follow up if symptoms do not improve or worsen in the next 2-4 weeks       Health Maintenance Review  Health Maintenance   Topic Date Due     FALL RISK ASSESSMENT  2019     ADVANCE DIRECTIVES DISCUSSED WITH PATIENT  2019     TD 18+ HE  05/10/2020     DXA SCAN  Completed     PNEUMOCOCCAL POLYSACCHARIDE VACCINE AGE 65 AND OVER  Completed     INFLUENZA VACCINE RULE BASED  Completed     PNEUMOCOCCAL CONJUGATE VACCINE FOR ADULTS (PCV13 OR PREVNAR)  Completed     ZOSTER VACCINE  Completed     ASTHMA CONTROL TEST  Excluded     ASTHMA FOLLOW-UP  Excluded         I am having Ms. Castellano maintain her multivitamin with minerals, loratadine, estradiol, KETOTIFEN FUMARATE (ALAWAY OPHT), cycloSPORINE, triamcinolone, erythromycin, albuterol, citalopram, metoprolol succinate, fluticasone, and calcium citrate-vitamin D.      HPI:  Neena Castellano is a 77 y.o. year old who presents to the office today for new vaginal concerns of a vaginal irrigation and cramping. Started after she started using Lush bubble bath bars about 1 month ago and noted the vaginal irritation around this time. She has tried an OTC Vagisil cream which  "seemed to help externally but has not helped with an internal cramping feeling \"which feels like period cramps\". Still uses Estrace vaginal cream regularly. Denies urinary urgency, dysuria, hematuria. No vaginal discharge or bleeding. She does have a history of hysterectomy around age 53 done as a preventative measure due to her family history of ovarian cancer and personal history of a hydatidiform mole pregnancy.     She asks about using estrace vaginal cream which she takes for atrophic vaginitis. Also reviewed cleansing vaginal area and avoidance of irritating soaps.     Review of Systems- pertinent positive in bold:  Negative for vaginal discharge, odor, bleeding       Current Scheduled Meds:  Outpatient Encounter Prescriptions as of 5/22/2018   Medication Sig Dispense Refill     albuterol (PROAIR HFA) 90 mcg/actuation inhaler Inhale 2 puffs every 4 (four) hours as needed for wheezing. 1 Inhaler 6     calcium citrate-vitamin D (CITRACAL+D) 315-200 mg-unit per tablet Take 1 tablet by mouth 2 (two) times a day.  0     citalopram (CELEXA) 20 MG tablet Take 1 tablet (20 mg total) by mouth daily. For anxiety. 90 tablet 3     cycloSPORINE (RESTASIS) 0.05 % ophthalmic emulsion INSTILL ONE DROP IN EACH EYE TWO TIMES A  each 3     erythromycin ophthalmic ointment APPLY A SMALL AMOUNT IN BOTH EYES ONCE IN THE EVENING  2     estradiol (ESTRACE) 0.01 % (0.1 mg/gram) vaginal cream APPLY ONE GRAM 3 DAYS PER WEEK VAGINALLY (Patient taking differently: 1 g. APPLY ONE GRAM 3 DAYS PER WEEK VAGINALLY) 85 g 3     fluticasone (FLONASE) 50 mcg/actuation nasal spray USE TWO SPRAYS IN EACH NOSTRIL EVERY DAY.  For allergies 16 g 6     KETOTIFEN FUMARATE (ALAWAY OPHT) Apply 1 drop to eye daily.       loratadine (CLARITIN) 10 mg tablet Take 10 mg by mouth daily.       metoprolol succinate (TOPROL-XL) 50 MG 24 hr tablet Take 1 tablet at bedtime.  For high blood pressure. 90 tablet 3     multivitamin with minerals (THERA-M) 9 mg " iron-400 mcg Tab tablet Take 1 tablet by mouth daily.       triamcinolone (KENALOG) 0.1 % cream Apply topically 2 (two) times a day. 454 g 4     No facility-administered encounter medications on file as of 5/22/2018.      Past Medical History:   Diagnosis Date     Anxiety      AR (allergic rhinitis)      Atrophic vaginitis      DNR (do not resuscitate) 10/25/2017     ETD (eustachian tube dysfunction)      Former smoker      GERD (gastroesophageal reflux disease)      HLD (hyperlipidemia)      HTN (hypertension)      Mild intermittent asthma      OA (osteoarthritis) 10/21/2016     Rotator cuff tendonitis      Varicose vein      Past Surgical History:   Procedure Laterality Date     BREAST BIOPSY Left     benign     BUNIONECTOMY Right 11/7/2014    Procedure: RIGHT 1ST METATARSAL PHALANGEAL JOINT FUSION ;  Surgeon: Washington Blue DPM;  Location: [facility];  Service:      CATARACT EXTRACTION, BILATERAL       HYSTERECTOMY  1993     OOPHORECTOMY  1993     HI REPAIR OF HAMMERTOE,ONE Right 11/7/2014    Procedure: RIGHT 2ND HAMMERTOE CORRECTION;  Surgeon: Washington Blue DPM;  Location: [facility];  Service: Podiatry     Social History   Substance Use Topics     Smoking status: Former Smoker     Packs/day: 1.00     Years: 20.00     Types: Cigarettes     Quit date: 1/28/1989     Smokeless tobacco: Never Used     Alcohol use No       Objective / Physical Examination:  Vitals:    05/22/18 0902   BP: 130/68   Pulse: 74   Weight: 126 lb (57.2 kg)     Wt Readings from Last 3 Encounters:   05/22/18 126 lb (57.2 kg)   04/24/18 126 lb (57.2 kg)   01/02/18 125 lb (56.7 kg)     Body mass index is 21.8 kg/(m^2).     General Appearance: Alert and oriented, cooperative, affect appropriate, speech clear, in no apparent distress  Head: Normocephalic, atraumatic  Ears: Tympanic membrane clear with landmarks well visualized bilaterally  Eyes: PERRL, fundi appear clear bilaterally. No AV nicking or microhemmorhages. Conjunctivae  clear and sclerae non-icteric  Nose: Septum midline, nares patent, no visible polyps, mucosa moist and without drainage  Throat: Lips and mucosa moist. Teeth in good repair, pharynx without erythema or exudate  Neck: Supple, trachea midline. No cervical adenopathy  Back: Symmetrical and nontender  Lungs: Clear to auscultation bilaterally. Normal inspiratory and expiratory effort  Cardiovascular: Regular rate, normal S1, S2. No murmurs, rubs, or gallops  Abdomen: Bowel sounds active all four quadrants. Soft, non-tender  Genital: EXTERNAL GENITALIA: Normal appearing vulva without masses, tenderness or lesions. PERINEUM: normal and intact. URETHRAL MEATUS: normal VAGINA:  vagina with normal color and without discharge or lesions. Normal vaginal introitus without irritation/discharge. CERVIX: surgically absent       No orders of the defined types were placed in this encounter.  Followup: Return if symptoms worsen or fail to improve. earlier if needed.        Cassie Baker, CNP

## 2021-06-20 NOTE — LETTER
Letter by Zach Canseco MD at      Author: Zach Canseco MD Service: -- Author Type: --    Filed:  Encounter Date: 8/3/2020 Status: (Other)       Neena Castellano   [address]  [place] [address]         Dear Neena,    I am sending you this letter to remind you that you are due for a follow up visit in September.    Please call to schedule this visit as soon as possible as our schedule fills up quickly.    If you already have an appointment scheduled with me for around this time, please ignore this notification.    I look forward to seeing you soon.      If you have any questions regarding the above, please feel free to contact me at [phone].        Sincerely,    Zach Canseco MD  General Internal Medicine  [facility]

## 2021-06-21 NOTE — PROGRESS NOTES
Subjective findings: The patient presented to the clinic today complaining of a crooked second toe right foot.  The patient has had an arthrodesis of the first metatarsal phalangeal joint.  She stated that she was expecting the toe to been once again because she did not realize the extent of the procedure.  She stated that she also has some irritation involving the second toe.  The toe is crooked and she has some irritation between the second and third toe.  This pain is mild.  She is an avid walker.  She exercises regularly.  She is able to participate in all activities without restriction due to any pain involving her toes.      Objective findings: Nails bilateral feet are normal length but discolored 1 through 5 both feet.  Skin bilaterally warm and intact.  There are well-healed surgical incision on the dorsal aspect of the first metatarsal phalangeal joint both feet.  DP and PT pulses are palpable bilaterally.  Capillary refill less than 2 seconds bilateral feet.  Negative clonus, negative Babinski bilateral feet.  Range of motion within normal limits bilateral feet.  Muscle power +5/5 bilaterally in all compartments.  There is 0 range of motion at the first metatarsal phalangeal joint right foot.  There is a flexion deformity at the proximal interphalangeal joint second toe right foot.    Assessment: Hammertoe second toe right foot    Plan: I told the patient that I do not recommend surgical correction of her mild hammertoe deformity of the second toe right foot.  The patient was told that if her symptoms progress then I would recommend surgical correction.  I told the patient that based on her signs and symptoms and her mild discomfort I do not believe surgery is needed at this time.  She is to return to the clinic as needed.

## 2021-06-22 NOTE — PROGRESS NOTES
Assessment/Plan:   1. Acute recurrent sinusitis, unspecified location  Acute sinusitis L>R following prolonged congestion due to allergies.    - amoxicillin-clavulanate (AUGMENTIN) 875-125 mg per tablet; Take 1 tablet by mouth 2 (two) times a day for 10 days.  Dispense: 20 tablet; Refill: 0    Steam, nasal saline  Continue flonase  Add augmentin twice a day for 10 days  Probiotics or yogurt  Follow up as needed    Subjective:      Neena Castellano is a 78 y.o. female who presents with worsening sinus pressure and congestion.  She has been struggling all fall with seasonal allergies but congestion has persisted. For the last 4-5 days she has had worse face pain and pressure, thick darker phlegm and more cough.  Mainly dry cough. No wheeze. Maxillary pain L>R.  Purulent nasal drainage. Today she has HA, ear pain ear ringing, increased PND.  No vertigo, no N/v/d. No rash. She uses claritin, flonase, and albuterol inhaler as needed. She tends to get a sinus infection yearly, improves once she gets antibiotics. H/o asthma. Former smoker.     Allergies   Allergen Reactions     Codeine Nausea And Vomiting     Levofloxacin Myalgia     Shoulder pain     Sulfa (Sulfonamide Antibiotics) Itching     Tetanus Toxoid Fluid      Allergy was to the horse serum type, has had a tetanus shot since then with no reaction     Current Outpatient Medications on File Prior to Visit   Medication Sig Dispense Refill     albuterol (PROAIR HFA;PROVENTIL HFA;VENTOLIN HFA) 90 mcg/actuation inhaler Inhale 2 puffs every 4 (four) hours as needed for wheezing May substitute the equivalent medication per insurance preference.. 1 each 11     calcium citrate-vitamin D (CITRACAL+D) 315-200 mg-unit per tablet Take 1 tablet by mouth 2 (two) times a day.  0     citalopram (CELEXA) 20 MG tablet Take 1 tablet (20 mg total) by mouth daily. For anxiety. 90 tablet 3     cycloSPORINE (RESTASIS) 0.05 % ophthalmic emulsion INSTILL ONE DROP IN EACH EYE TWO TIMES A   each 3     erythromycin ophthalmic ointment APPLY A SMALL AMOUNT IN BOTH EYES ONCE IN THE EVENING  2     estradiol (ESTRACE) 0.01 % (0.1 mg/gram) vaginal cream APPLY ONE GRAM 3 DAYS PER WEEK VAGINALLY 85 g 3     fluticasone (FLONASE) 50 mcg/actuation nasal spray USE TWO SPRAYS IN EACH NOSTRIL EVERY DAY.  For allergies 16 g 6     KETOTIFEN FUMARATE (ALAWAY OPHT) Apply 1 drop to eye daily.       loratadine (CLARITIN) 10 mg tablet Take 10 mg by mouth daily.       metoprolol succinate (TOPROL-XL) 50 MG 24 hr tablet Take 1 tablet at bedtime.  For high blood pressure. 90 tablet 3     multivitamin with minerals (THERA-M) 9 mg iron-400 mcg Tab tablet Take 1 tablet by mouth daily.       tretinoin (RETIN-A) 0.05 % cream Apply topically at bedtime. To affected area. 45 g 11     triamcinolone (KENALOG) 0.1 % cream Apply topically 2 (two) times a day. 454 g 4     No current facility-administered medications on file prior to visit.      Patient Active Problem List   Diagnosis     Atrophic vaginitis     GERD (gastroesophageal reflux disease)     HTN (hypertension)     Mild intermittent asthma     AR (allergic rhinitis)     Anxiety - Panic attacks and OCD features     OA (osteoarthritis)     ETD (eustachian tube dysfunction)     Former smoker     DNR (do not resuscitate)       Objective:     /78 (Patient Site: Right Arm, Patient Position: Sitting, Cuff Size: Adult Regular)   Pulse 76   Temp 98.3  F (36.8  C) (Oral)   Wt 127 lb 4.8 oz (57.7 kg)   LMP  (LMP Unknown)   SpO2 97%   Breastfeeding? No   BMI 22.02 kg/m      Physical  General Appearance: Alert, cooperative, no distress, appears stated age, AVSS  Head: Normocephalic, without obvious abnormality, atraumatic  Eyes: Conjunctivae are normal. Ears: Normal TMs and external ear canals, both ears  Nose:  Congestion, red swollen turbinates, purulent drainage and sinus pain with percussion L>R.   Throat: Throat is normal.  No exudate.  No significant  lesions  Neck: No adenopathy  Lungs: Clear to auscultation bilaterally, respirations unlabored  Heart: Regular rate and rhythm  Skin:  no rashes or lesions  Psychiatric: Patient has a normal mood and affect.

## 2021-06-23 NOTE — TELEPHONE ENCOUNTER
Refill Approved    Rx renewed per Medication Renewal Policy. Medication was last renewed on 12/30/17.    Michaela Herman, [facility] Triage/Med Refill 1/23/2019     Requested Prescriptions   Pending Prescriptions Disp Refills     fluticasone (FLONASE) 50 mcg/actuation nasal spray 16 g 6     Sig: USE TWO SPRAYS IN EACH NOSTRIL EVERY DAY.  For allergies    Nasal Steroid Refill Protocol Passed - 1/21/2019 11:37 AM       Passed - Patient has had office visit/physical in last 2 years    Last office visit with prescriber/PCP: 10/26/2018 OR same dept: 1/17/2019 Radha Gomez CNP OR same specialty: 1/17/2019 Radha Gomez CNP Last physical: Visit date not found Last MTM visit: Visit date not found    Next appt within 3 mo: Visit date not found  Next physical within 3 mo: Visit date not found  Prescriber OR PCP: Zach Canseco MD  Last diagnosis associated with med order: 1. Rhinitis, allergic  - fluticasone (FLONASE) 50 mcg/actuation nasal spray; USE TWO SPRAYS IN EACH NOSTRIL EVERY DAY.  For allergies  Dispense: 16 g; Refill: 6     If protocol passes may refill for 12 months if within 3 months of last provider visit (or a total of 15 months).

## 2021-06-23 NOTE — TELEPHONE ENCOUNTER
Please call patient     This prescription was sent in to   [facility]/pharmacy #[id number] - [place], [place] - [address]  [address] [address]  Phone: [phone] Fax: [phone]    She is not to use this unless her sinus symptoms are only present for 10 days or greater when they are more likely to be related to a bacteria.  She should not just take this after 1-2 days of symptoms or as a preventative.    Zach Canseco MD  [facility]  1/17/2019, 7:56 PM

## 2021-06-23 NOTE — PROGRESS NOTES
Assessment       1. Hemorrhoids, unspecified hemorrhoid type  - hydrocortisone (ANUSOL-HC) 2.5 % rectal cream; Apply rectally 2 times daily as needed  Dispense: 30 g; Refill: 1    2. Atrophic vaginitis  Recommend over-the-counter Replens as needed    3. Vaginal discharge  Given patient's recent antibiotic prescription and current symptoms certainly possible to have had a false negative result with utilization of over-the-counter preparations patient will be prescribed Diflucan 150 mg 1 tablet 1 time.  - Wet Prep, Vaginal  - fluconazole (DIFLUCAN) 150 MG tablet; Take 1 tablet (150 mg total) by mouth once for 1 dose.  Dispense: 1 tablet; Refill: 0    Patient advised if symptoms persist, worsen or new symptoms arise they are to seek medical care.  All patients questions addressed. Patient verbalized understanding and agreement with plan.     Plan:     There are no Patient Instructions on file for this visit.  I am having Jes Castellano start on hydrocortisone and fluconazole. I am also having her maintain her multivitamin with minerals, loratadine, KETOTIFEN FUMARATE (ALAWAY OPHT), cycloSPORINE, triamcinolone, erythromycin, fluticasone, calcium citrate-vitamin D, estradiol, metoprolol succinate, citalopram, tretinoin, and albuterol.  Orders Placed This Encounter   Procedures     Wet Prep, Vaginal   Followup: Return in about 7 days (around 1/24/2019), or if symptoms worsen or fail to improve. earlier if needed.  Subjective:      HPI: Neena Castellano is a 78 y.o. female presents to clinic today with concern for vaginal irritation discharge.  Patient was placed on antibiotics in December 2018 she notes since that time she been utilizing some over-the-counter antifungal that she thought she had a yeast infection that they had improved slightly though not completely resolved.  Patient also has a history of hemorrhoids she is wearing a pad at times is able bleed she is been utilizing some Tucks pads though has not for  relief of symptoms.    Past Medical History:   Diagnosis Date     Anxiety      AR (allergic rhinitis)      Atrophic vaginitis      DNR (do not resuscitate) 10/25/2017     ETD (eustachian tube dysfunction)      Former smoker      GERD (gastroesophageal reflux disease)      HLD (hyperlipidemia)      HTN (hypertension)      Mild intermittent asthma      OA (osteoarthritis) 10/21/2016     Rotator cuff tendonitis      Varicose vein      Past Surgical History:   Procedure Laterality Date     BREAST BIOPSY Left     benign     BUNIONECTOMY Right 11/7/2014    Procedure: RIGHT 1ST METATARSAL PHALANGEAL JOINT FUSION ;  Surgeon: Washington Blue DPM;  Location: [facility];  Service:      CATARACT EXTRACTION, BILATERAL       HYSTERECTOMY  1993     OOPHORECTOMY  1993     ME REPAIR OF HAMMERTOE,ONE Right 11/7/2014    Procedure: RIGHT 2ND HAMMERTOE CORRECTION;  Surgeon: Washington Blue DPM;  Location: [facility];  Service: Podiatry     Codeine; Levofloxacin; Sulfa (sulfonamide antibiotics); and Tetanus toxoid fluid  Outpatient Medications Prior to Visit   Medication Sig Dispense Refill     albuterol (PROAIR HFA;PROVENTIL HFA;VENTOLIN HFA) 90 mcg/actuation inhaler Inhale 2 puffs every 4 (four) hours as needed for wheezing May substitute the equivalent medication per insurance preference.. 1 each 11     calcium citrate-vitamin D (CITRACAL+D) 315-200 mg-unit per tablet Take 1 tablet by mouth 2 (two) times a day.  0     citalopram (CELEXA) 20 MG tablet Take 1 tablet (20 mg total) by mouth daily. For anxiety. 90 tablet 3     cycloSPORINE (RESTASIS) 0.05 % ophthalmic emulsion INSTILL ONE DROP IN EACH EYE TWO TIMES A  each 3     erythromycin ophthalmic ointment APPLY A SMALL AMOUNT IN BOTH EYES ONCE IN THE EVENING  2     estradiol (ESTRACE) 0.01 % (0.1 mg/gram) vaginal cream APPLY ONE GRAM 3 DAYS PER WEEK VAGINALLY 85 g 3     fluticasone (FLONASE) 50 mcg/actuation nasal spray USE TWO SPRAYS IN EACH NOSTRIL EVERY DAY.  For  "allergies 16 g 6     KETOTIFEN FUMARATE (ALAWAY OPHT) Apply 1 drop to eye daily.       loratadine (CLARITIN) 10 mg tablet Take 10 mg by mouth daily.       metoprolol succinate (TOPROL-XL) 50 MG 24 hr tablet Take 1 tablet at bedtime.  For high blood pressure. 90 tablet 3     multivitamin with minerals (THERA-M) 9 mg iron-400 mcg Tab tablet Take 1 tablet by mouth daily.       tretinoin (RETIN-A) 0.05 % cream Apply topically at bedtime. To affected area. 45 g 11     triamcinolone (KENALOG) 0.1 % cream Apply topically 2 (two) times a day. 454 g 4     No facility-administered medications prior to visit.      Family History   Problem Relation Age of Onset     Ovarian cancer Mother 83     No Medical Problems Father      No Medical Problems Sister      No Medical Problems Daughter      No Medical Problems Son      No Medical Problems Daughter      BRCA 1/2 Neg Hx      Breast cancer Neg Hx      Cancer Neg Hx      Colon cancer Neg Hx      Endometrial cancer Neg Hx      Social History     Social History Narrative    ,   at age 89.        3 children.  Former .        Previous patient of Dr. Moore.     Patient Active Problem List   Diagnosis     Atrophic vaginitis     GERD (gastroesophageal reflux disease)     HTN (hypertension)     Mild intermittent asthma     AR (allergic rhinitis)     Anxiety - Panic attacks and OCD features     OA (osteoarthritis)     ETD (eustachian tube dysfunction)     Former smoker     DNR (do not resuscitate)       Review of Systems  Unremarkable other than listed in HPI        Objective:     Vitals:    19 1115   BP: 118/60   Pulse: 64   SpO2: 96%   Weight: 127 lb (57.6 kg)   Height: 5' 3.75\" (1.619 m)       Physical Exam:   Gen - Alert, no acute distress.   Lungs -respirations are regular nonlabored  /rectal-patient noted to have a small amount of white discharge, the skin is dry along the labia minora and labia majora.  She has a small " nonthrombosed hemorrhoid noted    No results found.  Recent Results (from the past 240 hour(s))   Wet Prep, Vaginal   Result Value Ref Range    Yeast Result No yeast seen No yeast seen    Trichomonas No Trichomonas seen No Trichomonas seen    Clue Cells, Wet Prep No Clue cells seen No Clue cells seen         Radha Gomez, CNP

## 2021-06-23 NOTE — TELEPHONE ENCOUNTER
Patient was seen this morning by Radha Gomez CNP and reqested a z-pack for travels to [place] in February. Patient states that Dr. Moore in the past would give an Rx for a Z-pack just in case while traveling. Patient states that she gets ill sometimes from being in an airplane airports.     Please advise on request from patient.

## 2021-06-25 ENCOUNTER — OFFICE VISIT (OUTPATIENT)
Dept: NEUROSURGERY | Facility: CLINIC | Age: 81
End: 2021-06-25
Attending: NEUROLOGICAL SURGERY
Payer: COMMERCIAL

## 2021-06-25 VITALS
WEIGHT: 121 LBS | HEART RATE: 65 BPM | SYSTOLIC BLOOD PRESSURE: 153 MMHG | DIASTOLIC BLOOD PRESSURE: 82 MMHG | OXYGEN SATURATION: 96 % | HEIGHT: 64 IN | TEMPERATURE: 98.1 F | BODY MASS INDEX: 20.66 KG/M2

## 2021-06-25 DIAGNOSIS — Z98.1 S/P CERVICAL SPINAL FUSION: Primary | ICD-10-CM

## 2021-06-25 PROCEDURE — G0463 HOSPITAL OUTPT CLINIC VISIT: HCPCS

## 2021-06-25 PROCEDURE — 99213 OFFICE O/P EST LOW 20 MIN: CPT | Performed by: NEUROLOGICAL SURGERY

## 2021-06-25 ASSESSMENT — MIFFLIN-ST. JEOR: SCORE: 1007.82

## 2021-06-25 ASSESSMENT — PAIN SCALES - GENERAL: PAINLEVEL: NO PAIN (0)

## 2021-06-25 NOTE — PROGRESS NOTES
Progress Notes by Zach Canseco MD at 4/21/2017  1:25 PM     Author: Zach Canseco MD Service: -- Author Type: Physician    Filed: 4/22/2017  6:31 PM Encounter Date: 4/21/2017 Status: Signed    : Zach Canseco MD (Physician)              [facility]    Comprehensive and complex medical care - Chronic disease management - Shared decision making - Care coordination - Compassionate care    Patient advocacy - Rational deprescribing - Minimally disruptive medicine - Ethical focus - Customized care    Date of Service: 4/21/2017  Primary Provider: Zach Canseco MD    Patient Care Team:  Zach Canseco MD as PCP - General (Internal Medicine)  David Isabel MD as Physician (Dermatology)  Jeff Roach MD as Physician (Orthopedic Surgery)  Suhail Lundberg MD as Physician (Otolaryngology)  Naomi Saini MD as Physician (Ophthalmology)  Beverly Camacho MD as Physician (Allergy)     ______________________________________________________________________     Patient's Pharmacy:    [facility]/pharmacy [place] [address]  Phone: [phone] Fax: [phone]    [facility] [address] AT Portal to [facility]  [address]  [facility] [address]  Phone: [phone] Fax: [phone]     Patient's Insurance:    Payor: BLUE CROSS / Plan: BLUE CROSS PLATINUM / Product Type: COST PLAN /     ______________________________________________________________________     Neenaelba Castellano is 76 y.o. female who comes in today for:    Chief Complaint   Patient presents with   ? Follow-up     6 month FU       Patient Active Problem List   Diagnosis   ? Atrophic vaginitis   ? GERD (gastroesophageal reflux disease)   ? HTN (hypertension)   ? Mild intermittent asthma   ? AR (allergic rhinitis)   ? Anxiety - Panic attacks and OCD features    ? OA (osteoarthritis)   ? ETD (eustachian tube dysfunction)   ? Former smoker     Current Outpatient Prescriptions   Medication Sig   ? albuterol (PROAIR HFA) 90 mcg/actuation inhaler Inhale 2 puffs every 4 (four) hours as needed for wheezing.   ? calcium citrate-vitamin D (CITRACAL+D) 315-200 mg-unit per tablet Take 2 tablets by mouth 2 (two) times a day.   ? citalopram (CELEXA) 20 MG tablet Take 1 tablet (20 mg total) by mouth daily.   ? estradiol (ESTRACE) 0.01 % (0.1 mg/gram) vaginal cream APPLY ONE GRAM 3 DAYS PER WEEK VAGINALLY (Patient taking differently: 1 g. APPLY ONE GRAM 3 DAYS PER WEEK VAGINALLY)   ? fluticasone (FLONASE) 50 mcg/actuation nasal spray USE TWO SPRAYS IN EACH NOSTRIL EVERY DAY   ? KETOTIFEN FUMARATE (ALAWAY OPHT) Apply 1 drop to eye daily.   ? loratadine (CLARITIN) 10 mg tablet Take 10 mg by mouth daily.   ? metoprolol succinate (TOPROL-XL) 50 MG 24 hr tablet Take 1 tablet at bedtime   ? multivitamin with minerals (THERA-M) 9 mg iron-400 mcg Tab tablet Take 1 tablet by mouth daily.   ? RESTASIS 0.05 % ophthalmic emulsion INSTILL ONE DROP IN EACH EYE TWO TIMES A DAY   ? ketoconazole (NIZORAL) 2 % cream Apply sparingly to rash twice a day   ? triamcinolone (KENALOG) 0.5 % ointment Apply sparingly to rash twice a day     Social History     Social History Narrative    ,   at age 89.        3 children.  Former .        Previous patient of Dr. Moore.     ______________________________________________________________________     History of present illness:    Patient comes in today for follow-up of an issues.    We first reviewed her asthma and she is generally been doing well.  She has followed up with allergy for this.  She has no major concerns about this.  She does have some hoarseness at times.  This was not helped with using acid blockers.  Her breathing does not seem to be affected by some hoarseness.    We reviewed her high blood  pressure.  Her blood pressure is doing well.  She is not having side effects from the metoprolol.    We reviewed her anxiety.  She does say that she has problems with this off and on.  She remains on the citalopram.  She doesn't want to do anything changes at this time.    We reviewed her reflux disease and she is off ranitidine and omeprazole without any adverse effects.    We reviewed her atrophic vaginitis and she is using the estradiol cream every 5 days this seems to work well for her.    She recently saw Dr. Roach for her right knee arthritis and he has been managing this with her.  She also saw Dr. Blue for the second toe deformity related to her previous hammertoe surgery.     On review of systems, the patient denies any chest pain or shortness of breath.    ______________________________________________________________________    Exam:    Wt Readings from Last 3 Encounters:   04/21/17 125 lb 9.6 oz (57 kg)   03/16/17 128 lb (58.1 kg)   02/09/17 129 lb (58.5 kg)     BP Readings from Last 3 Encounters:   04/21/17 118/64   03/16/17 118/66   02/09/17 112/74     /64  Pulse 64  Wt 125 lb 9.6 oz (57 kg)  LMP  (LMP Unknown)  BMI 21.73 kg/m2   The patient is comfortable, no acute distress.  Mood good.  Insight good.  Eyes are nonicteric.  Neck is supple without mass.  No cervical adenopathy.  No thyromegaly. Heart regular rate and rhythm.  Lungs clear to auscultation bilaterally.  Respiratory effort is good.  Abdomen soft and nontender.  No hepatosplenomegaly.  Extremities no edema.      ______________________________________________________________________    Diagnostics:    Results for orders placed or performed in visit on 10/21/16   HM2(CBC w/o Differential)   Result Value Ref Range    WBC 6.3 4.0 - 11.0 thou/uL    RBC 5.41 (H) 3.80 - 5.40 mill/uL    Hemoglobin 16.5 (H) 12.0 - 16.0 g/dL    Hematocrit 48.2 (H) 35.0 - 47.0 %    MCV 89 80 - 100 fL    MCH 30.4 27.0 - 34.0 pg    MCHC 34.2 32.0 - 36.0 g/dL     RDW 11.2 11.0 - 14.5 %    Platelets 261 140 - 440 thou/uL    MPV 8.5 7.0 - 10.0 fL   Hepatic Profile   Result Value Ref Range    Bilirubin, Total 0.8 0.0 - 1.0 mg/dL    Bilirubin, Direct 0.2 <=0.5 mg/dL    Protein, Total 6.9 6.0 - 8.0 g/dL    Albumin 4.2 3.5 - 5.0 g/dL    Alkaline Phosphatase 59 45 - 120 U/L    AST 38 0 - 40 U/L    ALT 25 0 - 45 U/L      ______________________________________________________________________    Assessment:    1. HTN (hypertension)    2. Anxiety - Panic attacks and OCD features    3. Mild intermittent asthma    4. Atrophic vaginitis    5. GERD (gastroesophageal reflux disease)    6. OA (osteoarthritis)       ______________________________________________________________________      Lab Results   Component Value Date    LDLCALC 126 07/08/2016       PHQ-2 Total Score: 0 (10/21/2016  7:00 AM)  No Data Recorded    Plan:    1. Do blood work at next visit.  2. Continue current medications as is.  3. Follow-up in 6 months if otherwise doing well.  4. Consider discussing advanced directives with the patient in the future.    Zach Canseco MD  General Internal Medicine  [facility]     Return in about 6 months (around 10/21/2017) for visit and blood work.

## 2021-06-25 NOTE — LETTER
6/25/2021         RE: Neena Castellano  [address] [address]""[place] [address]        Dear Colleague,    Thank you for referring your patient, Neena Castellano, to the [facility]. Please see a copy of my visit note below.    Neurosurgery Clinic Note    79 F s/p fall in [place] in February 2020, suffering cervical spine fracture requiring C6/7 ACDF and C5-T11 posterior fixation fusion. She was last seen on 3/13/2020 and returns today for a follow-up.    Since last seen, the patient was weaned off the c-collar. She has resume her daily exercises, including tread mail and lifting of 5lb weights. While she is more easily fatigued in terms of her neck and back extensor muscles relative to her pre-injury condition, she feels that she is able to perform the activities that are important to her.     Patient's report of her well-being was confirmed with the patient's daughter.     No new complaints on review of systems.     On examination, there is prominent thoracic vertebra spinous process due to muscle atrophy. Incision well healed. Motor examination grossly non-focal. Sensation intact to LT. Normal gait. Diffuse hyporeflexia (1+).    No new imaging.    AP: The patient is doing quite well neurologically and has recovered nicely from her surgery. At this point, the patient can be safely discharged from the clinic. She is instructed to contact my office should she experience any new neurologic symptoms.              Again, thank you for allowing me to participate in the care of your patient.        Sincerely,        Balwinder Polanco MD

## 2021-06-25 NOTE — NURSING NOTE
"Oncology Rooming Note    June 25, 2021 12:53 PM   Neena Castellano is a 80 year old female who presents for:    Chief Complaint   Patient presents with     Oncology Clinic Visit     SP cervical fusion     Initial Vitals: BP (!) 153/82   Pulse 65   Temp 98.1  F (36.7  C)   Ht 1.632 m (5' 4.25\")   Wt 54.9 kg (121 lb)   SpO2 96%   BMI 20.61 kg/m   Estimated body mass index is 20.61 kg/m  as calculated from the following:    Height as of this encounter: 1.632 m (5' 4.25\").    Weight as of this encounter: 54.9 kg (121 lb). Body surface area is 1.58 meters squared.  No Pain (0) Comment: Data Unavailable   No LMP recorded. Patient is postmenopausal.  Allergies reviewed: Yes  Medications reviewed: Yes    Medications: Medication refills not needed today.  Pharmacy name entered into [facility]: [id number] [place] [address]    Clinical concerns: Patient has no new concerns today        Michael Hernandez MA            "

## 2021-06-25 NOTE — PROGRESS NOTES
Neurosurgery Clinic Note    79 F s/p fall in [place] in February 2020, suffering cervical spine fracture requiring C6/7 ACDF and C5-T11 posterior fixation fusion. She was last seen on 3/13/2020 and returns today for a follow-up.    Since last seen, the patient was weaned off the c-collar. She has resume her daily exercises, including tread mail and lifting of 5lb weights. While she is more easily fatigued in terms of her neck and back extensor muscles relative to her pre-injury condition, she feels that she is able to perform the activities that are important to her.     Patient's report of her well-being was confirmed with the patient's daughter.     No new complaints on review of systems.     On examination, there is prominent thoracic vertebra spinous process due to muscle atrophy. Incision well healed. Motor examination grossly non-focal. Sensation intact to LT. Normal gait. Diffuse hyporeflexia (1+).    No new imaging.    AP: The patient is doing quite well neurologically and has recovered nicely from her surgery. At this point, the patient can be safely discharged from the clinic. She is instructed to contact my office should she experience any new neurologic symptoms.

## 2021-06-25 NOTE — PROGRESS NOTES
Wt Readings from Last 20 Encounters:   03/18/19 125 lb (56.7 kg)   01/17/19 127 lb (57.6 kg)   12/26/18 127 lb 4.8 oz (57.7 kg)   10/30/18 128 lb (58.1 kg)   10/26/18 128 lb (58.1 kg)   05/22/18 126 lb (57.2 kg)   04/24/18 126 lb (57.2 kg)   01/02/18 125 lb (56.7 kg)   12/11/17 125 lb 3.2 oz (56.8 kg)   10/30/17 123 lb 6.4 oz (56 kg)   10/24/17 125 lb (56.7 kg)   04/21/17 125 lb 9.6 oz (57 kg)   03/16/17 128 lb (58.1 kg)   02/09/17 129 lb (58.5 kg)   01/06/17 130 lb (59 kg)   12/29/16 130 lb (59 kg)   10/21/16 126 lb 3.2 oz (57.2 kg)   09/28/16 125 lb (56.7 kg)   07/08/16 130 lb (59 kg)   06/02/16 128 lb (58.1 kg)

## 2021-06-25 NOTE — PROGRESS NOTES
Progress Notes by Zach Canseco MD at 10/24/2017 10:55 AM     Author: Zach Canseco MD Service: -- Author Type: Physician    Filed: 10/25/2017  6:47 PM Encounter Date: 10/24/2017 Status: Signed    : Zach Canseco MD (Physician)              [facility]    Comprehensive and complex medical care - Chronic disease management - Shared decision making - Care coordination - Compassionate care    Patient advocacy - Rational deprescribing - Minimally disruptive medicine - Ethical focus - Customized care    Date of Service: 10/24/2017  Primary Provider: Zach Cansceo MD    Patient Care Team:  Zach Canseco MD as PCP - General (Internal Medicine)  David Isabel MD as Physician (Dermatology)  Jeff Roach MD as Physician (Orthopedic Surgery)  Suhail Lundberg MD as Physician (Otolaryngology)  Naomi Saini MD as Physician (Ophthalmology)  Beverly Camacho MD as Physician (Allergy)     ______________________________________________________________________     Patient's Pharmacy:    [facility]/pharmacy [place] [address]  Phone: [phone] Fax: [phone]    [facility] [address] AT Portal to [facility] Sites  [address]  [facility] [address]  Phone: [phone] Fax: [phone]     Patient's Insurance:    Payor: BLUE CROSS / Plan: BLUE CROSS PLATINUM / Product Type: COST PLAN /     ______________________________________________________________________     Neena MAURA Castellano is 77 y.o. female who comes in today for:     Chief Complaint   Patient presents with   ? Mass     on right arm   ? Follow-up     doing very well       Patient Active Problem List   Diagnosis   ? Atrophic vaginitis   ? GERD (gastroesophageal reflux disease)   ? HTN (hypertension)   ? Mild intermittent asthma   ? AR (allergic rhinitis)   ? Anxiety  - Panic attacks and OCD features   ? OA (osteoarthritis)   ? ETD (eustachian tube dysfunction)   ? Former smoker   ? DNR (do not resuscitate)     Past Medical History:   Diagnosis Date   ? Anxiety    ? AR (allergic rhinitis)    ? Atrophic vaginitis    ? DNR (do not resuscitate) 10/25/2017   ? ETD (eustachian tube dysfunction)    ? Former smoker    ? GERD (gastroesophageal reflux disease)    ? HLD (hyperlipidemia)    ? HTN (hypertension)    ? Mild intermittent asthma    ? OA (osteoarthritis) 10/21/2016   ? Rotator cuff tendonitis    ? Varicose vein       Current Outpatient Prescriptions   Medication Sig   ? albuterol (PROAIR HFA) 90 mcg/actuation inhaler Inhale 2 puffs every 4 (four) hours as needed for wheezing.   ? calcium citrate-vitamin D (CITRACAL+D) 315-200 mg-unit per tablet Take 2 tablets by mouth 2 (two) times a day.   ? citalopram (CELEXA) 20 MG tablet Take 1 tablet (20 mg total) by mouth daily.   ? cycloSPORINE (RESTASIS) 0.05 % ophthalmic emulsion INSTILL ONE DROP IN EACH EYE TWO TIMES A DAY   ? estradiol (ESTRACE) 0.01 % (0.1 mg/gram) vaginal cream APPLY ONE GRAM 3 DAYS PER WEEK VAGINALLY (Patient taking differently: 1 g. APPLY ONE GRAM 3 DAYS PER WEEK VAGINALLY)   ? fluticasone (FLONASE) 50 mcg/actuation nasal spray USE TWO SPRAYS IN EACH NOSTRIL EVERY DAY   ? ketoconazole (NIZORAL) 2 % cream Apply sparingly to rash twice a day   ? KETOTIFEN FUMARATE (ALAWAY OPHT) Apply 1 drop to eye daily.   ? loratadine (CLARITIN) 10 mg tablet Take 10 mg by mouth daily.   ? metoprolol succinate (TOPROL-XL) 50 MG 24 hr tablet Take 1 tablet at bedtime   ? multivitamin with minerals (THERA-M) 9 mg iron-400 mcg Tab tablet Take 1 tablet by mouth daily.   ? triamcinolone (KENALOG) 0.5 % ointment Apply sparingly to rash twice a day   ? triamcinolone (KENALOG) 0.1 % cream Apply topically 2 (two) times a day.     Social History     Social History   ? Marital status:      Spouse name: N/A   ? Number of children: 3   ?  Years of education: N/A     Occupational History   ? , elementary Retired     Social History Main Topics   ? Smoking status: Former Smoker     Packs/day: 1.00     Years: 20.00     Types: Cigarettes     Quit date: 1989   ? Smokeless tobacco: None   ? Alcohol use No   ? Drug use: No   ? Sexual activity: Not Asked     Other Topics Concern   ? None     Social History Narrative    ,   at age 89.        3 children.  Former .        Previous patient of Dr. Moore.     Family History   Problem Relation Age of Onset   ? Ovarian cancer Mother 83   ? No Medical Problems Father    ? No Medical Problems Sister    ? No Medical Problems Daughter    ? No Medical Problems Son    ? No Medical Problems Daughter    ? BRCA 1/2 Neg Hx    ? Breast cancer Neg Hx    ? Cancer Neg Hx    ? Colon cancer Neg Hx    ? Endometrial cancer Neg Hx       Family history is otherwise noncontributory.    ______________________________________________________________________     History of present illness:    Patient comes in today for follow-up of a number of issues.    We first reviewed her high blood pressure.  Her blood pressure seems to be doing well for her at she has no major she is taking the metoprolol well without issue.    We reviewed her mild intermittent asthma.  She is taking her inhaler as needed at this time.  She also takes allergy medication.  We can continue to refill this as needed.    Her anxiety is stable at this time and she continues on citalopram at this point.    She has a skin lesion on her right arm which is irritated and bothers her.  She would like to remove this if possible.  There does not seem to be any fluctuance or warmth to this.  There is a little bump at the tip of it, however.    We reviewed her osteoarthritis and things are stable here.    She has a rash over her back which has been chronically there.  She does itch it frequently.  It does irritate  regularly.  She is not using anything on this.    Her heartburn is doing well without issue.    She gets an occasional headache.  Her sinuses are doing well.  She has a hoarse voice which has been chronic.  She occasionally gets some aches in her wrist and back.    We reviewed her other issues noted in the assessment but not specifically addressed in the HPI above.     10 point review of systems is negative unless noted above.   ______________________________________________________________________     Exam:    Wt Readings from Last 3 Encounters:   10/24/17 125 lb (56.7 kg)   04/21/17 125 lb 9.6 oz (57 kg)   03/16/17 128 lb (58.1 kg)     BP Readings from Last 3 Encounters:   10/24/17 118/70   04/21/17 118/64   03/16/17 118/66      /70  Pulse (!) 56  Temp 97.8  F (36.6  C) (Oral)   Wt 125 lb (56.7 kg)  LMP  (LMP Unknown)  BMI 21.62 kg/m2   The patient is comfortable, no acute distress.  Mood good.  Insight is good.  No skin lesions or nodules of concern except that she has lichen simplex chronicus and neurodermatitis of her upper back.  Ears clear.  Eyes are nonicteric.  Pupils equal and reactive.  Throat is clear.  Neck is supple without mass, no thyromegaly.  Carotids are clear.  No cervical or epitrochlear adenopathy.  Heart regular rate and rhythm.  Lungs clear to auscultation bilaterally.  Respiratory effort good.  Abdomen soft and nontender.  No hepatosplenomegaly.  Extremities show no edema.  There is no synovitis of her hands or wrists.  She has an irritated lipoma of her right arm.  This does not appear to be a sebaceous cyst.    ______________________________________________________________________    Diagnostics:    Results for orders placed or performed in visit on 10/24/17   Comprehensive Metabolic Panel   Result Value Ref Range    Sodium 142 136 - 145 mmol/L    Potassium 4.5 3.5 - 5.0 mmol/L    Chloride 103 98 - 107 mmol/L    CO2 29 22 - 31 mmol/L    Anion Gap, Calculation 10 5 - 18 mmol/L     Glucose 86 70 - 125 mg/dL    BUN 19 8 - 28 mg/dL    Creatinine 0.81 0.60 - 1.10 mg/dL    GFR MDRD Af Amer >60 >60 mL/min/1.73m2    GFR MDRD Non Af Amer >60 >60 mL/min/1.73m2    Bilirubin, Total 0.8 0.0 - 1.0 mg/dL    Calcium 10.2 8.5 - 10.5 mg/dL    Protein, Total 7.3 6.0 - 8.0 g/dL    Albumin 4.1 3.5 - 5.0 g/dL    Alkaline Phosphatase 63 45 - 120 U/L    AST 43 (H) 0 - 40 U/L    ALT 30 0 - 45 U/L   HM1 (CBC with Diff)   Result Value Ref Range    WBC 5.4 4.0 - 11.0 thou/uL    RBC 4.26 3.80 - 5.40 mill/uL    Hemoglobin 13.5 12.0 - 16.0 g/dL    Hematocrit 38.7 35.0 - 47.0 %    MCV 91 80 - 100 fL    MCH 31.6 27.0 - 34.0 pg    MCHC 34.8 32.0 - 36.0 g/dL    RDW 12.2 11.0 - 14.5 %    Platelets 223 140 - 440 thou/uL    MPV 7.6 7.0 - 10.0 fL    Neutrophils % 62 50 - 70 %    Lymphocytes % 31 20 - 40 %    Monocytes % 6 2 - 10 %    Eosinophils % 1 0 - 6 %    Basophils % 1 0 - 2 %    Neutrophils Absolute 3.3 2.0 - 7.7 thou/uL    Lymphocytes Absolute 1.7 0.8 - 4.4 thou/uL    Monocytes Absolute 0.3 0.0 - 0.9 thou/uL    Eosinophils Absolute 0.1 0.0 - 0.4 thou/uL    Basophils Absolute 0.0 0.0 - 0.2 thou/uL      ______________________________________________________________________     Assessment:    1. HTN (hypertension)    2. Need for influenza vaccination    3. Polycythemia    4. Lipoma of arm, right, irritated    5. Anxiety - Panic attacks and OCD features    6. Mild intermittent asthma    7. DNR (do not resuscitate)    8. Lichen simplex chronicus    9. Neurodermatitis       ______________________________________________________________________     PHQ-2 Total Score: 0 (10/24/2017 10:00 AM)  No Data Recorded      Plan:    1. DNR CODE STATUS.  2. Check blood work today.  3. Try triamcinolone cream for her back.  4. Continue current blood pressure medications.  May refill fluticasone and other medications through me as needed.  5. Continue current asthma management.  Consider reading tests in the future if needed.  6. Referred on  to surgery for removal of irritated lipoma of her arm.    Zach Canseco MD  General Internal Medicine  [facility]    Return in about 6 months (around 4/24/2018) for follow up visit.

## 2021-06-26 NOTE — PROGRESS NOTES
Progress Notes by Zach Canseco MD at 10/26/2018  8:25 AM     Author: Zach Canseco MD Service: -- Author Type: Physician    Filed: 10/26/2018  9:49 AM Encounter Date: 10/26/2018 Status: Signed    : Zach Canseco MD (Physician)              [facility]    Comprehensive and complex medical care - Chronic disease management - Shared decision making - Care coordination - Compassionate care    Patient advocacy - Rational deprescribing - Minimally disruptive medicine - Ethical focus - Customized care    ______________________________________________________________________     Date of Service: 10/26/2018  Primary Provider: Zach Canseco MD    Patient Care Team:  Zach Canseco MD as PCP - General (Internal Medicine)  David Isabel MD as Physician (Dermatology)  Jeff Roach MD as Physician (Orthopedic Surgery)  Suhail Lundberg MD as Physician (Otolaryngology)  Naomi Saini MD as Physician (Ophthalmology)  Beverly Camacho MD as Physician (Allergy)     ______________________________________________________________________     Patient's Pharmacy:    [facility]/pharmacy #[id number] [place] [address] - [address] [place] [address]  Phone: [phone] Fax: [phone]    [facility] [place] - [address] AT Portal to Registered [facility] Sites  [address] [place] [address]  Phone: [phone] Fax: [phone]     Patient's Contacts:  Name Home Phone Work Phone Mobile Phone Relationship Lgl KENNEY Harris   [phone] Child    PANCHO FINK   [phone] Child      Patient's Insurance:    Payor: BLUE CROSS / Plan: BLUE CROSS PLATINUM / Product Type: COST PLAN /     ______________________________________________________________________     Neena LORENZO Gray is 78 y.o. female who comes in today for:    Chief Complaint   Patient presents with   ? Follow-up     would like  spot on chest re frozen   ? Hemorrhoids     bleed sometimes       Patient Active Problem List   Diagnosis   ? Atrophic vaginitis   ? GERD (gastroesophageal reflux disease)   ? HTN (hypertension)   ? Mild intermittent asthma   ? AR (allergic rhinitis)   ? Anxiety - Panic attacks and OCD features   ? OA (osteoarthritis)   ? ETD (eustachian tube dysfunction)   ? Former smoker   ? DNR (do not resuscitate)     Current Outpatient Prescriptions   Medication Sig Note   ? calcium citrate-vitamin D (CITRACAL+D) 315-200 mg-unit per tablet Take 1 tablet by mouth 2 (two) times a day.    ? cycloSPORINE (RESTASIS) 0.05 % ophthalmic emulsion INSTILL ONE DROP IN EACH EYE TWO TIMES A DAY    ? erythromycin ophthalmic ointment APPLY A SMALL AMOUNT IN BOTH EYES ONCE IN THE EVENING 2017: Received from: External Pharmacy   ? estradiol (ESTRACE) 0.01 % (0.1 mg/gram) vaginal cream APPLY ONE GRAM 3 DAYS PER WEEK VAGINALLY    ? fluticasone (FLONASE) 50 mcg/actuation nasal spray USE TWO SPRAYS IN EACH NOSTRIL EVERY DAY.  For allergies    ? KETOTIFEN FUMARATE (ALAWAY OPHT) Apply 1 drop to eye daily.    ? loratadine (CLARITIN) 10 mg tablet Take 10 mg by mouth daily.    ? multivitamin with minerals (THERA-M) 9 mg iron-400 mcg Tab tablet Take 1 tablet by mouth daily.    ? triamcinolone (KENALOG) 0.1 % cream Apply topically 2 (two) times a day.    ? albuterol (PROAIR HFA) 90 mcg/actuation inhaler Inhale 2 puffs every 4 (four) hours as needed for wheezing.    ? citalopram (CELEXA) 20 MG tablet Take 1 tablet (20 mg total) by mouth daily. For anxiety.    ? metoprolol succinate (TOPROL-XL) 50 MG 24 hr tablet Take 1 tablet at bedtime.  For high blood pressure.    ? tretinoin (RETIN-A) 0.025 % cream Apply topically at bedtime.      Social History     Social History Narrative    ,   at age 89.        3 children.  Former .        Previous patient of Dr. Moore.      ______________________________________________________________________     History of present illness:    Patient comes in today for a number of issues.    We first reviewed a lesion on her anterior chest.  It has been treated once by Dr. Moore and once by dermatology.  It gets brown and gets irritated and flakes off.  She has had it frozen a few times and it stays away for a while but then comes back.  It is not increasing in size.  We talked about having her see dermatology for this again versus trying to refreeze and she wants to try refreezing at this time.    Reviewed her high blood pressure and her blood pressure is doing well without issue.    We reviewed her mild intermittent asthma.  She does get some rhinitis at night as well.  She uses her inhaler infrequently.  She uses it at night if needed.    Reviewed her anxiety and the citalopram is going well for her.  She does note that she gets some combative dreams on the higher dose.  We talked about cutting it back but she is not so certain she wants to do this.  She definitely does not want to change medications.    She gets rectal itching and occasional bleeding with this itching.  She feels she has a hemorrhoid.  She denies any pain.  This occurs may be 4 times a year and only lasts for short while (less than 1 day).  We reviewed her colonoscopy which was in 2012.  There were no severe hemorrhoids at that time but no other abnormalities.  We reviewed possibly having her see GI for this but she does not want to do this at this time.  She would like to use something over-the-counter and let us know if it is worsening.  The bleeding is only on the paper.    We reviewed her other issues noted in the assessment but not specifically addressed in the HPI above.     On review of systems, the patient denies any chest pain or shortness of breath.    ______________________________________________________________________    Exam:    Wt Readings from Last 3  Encounters:   10/26/18 128 lb (58.1 kg)   05/22/18 126 lb (57.2 kg)   04/24/18 126 lb (57.2 kg)     BP Readings from Last 3 Encounters:   10/26/18 134/68   05/22/18 130/68   04/24/18 110/68     /68  Pulse (!) 55  Wt 128 lb (58.1 kg)  LMP  (LMP Unknown)  SpO2 97%  BMI 22.14 kg/m2   The patient is comfortable, no acute distress.  Mood good.  Insight good.  Eyes are nonicteric.  Neck is supple without mass.  No cervical adenopathy.  No thyromegaly. Heart regular rate and rhythm.  Lungs clear to auscultation bilaterally.  Respiratory effort is good.  Abdomen soft and nontender.  No hepatosplenomegaly.  Extremities no edema.  She has an irritated seborrheic keratosis which is about 1 cm on her chest.  We did discuss a rectal exam today, but she declines this at this time.      ______________________________________________________________________    Diagnostics:    Results for orders placed or performed in visit on 10/30/17   Surgical Pathology Exam   Result Value Ref Range    Case Report       Surgical Pathology Report                         Case: [id number]                                    Authorizing Provider:  Libia Wallace DO          Collected:           10/30/2017 1030              Ordering Location:     [facility] General Surgery Received:            10/30/2017 1217                                     and Bariatrics Care                                                          Pathologist:           Zach Vivas MD                                                       Specimen:    Arm, Right                                                                                 Final Diagnosis       SOFT TISSUE FROM RIGHT ARM, EXCISION:    - CUTANEOUS AND SUBCUTANEOUS NEUROFIBROMA    Microscopic Description       Microscopic examination performed, substantiating the above diagnosis.    Clinical Information       Clinical history:  Mass present for 10 years  Reason for procedure:  Growth  Time placed  "in formalin:   9:45 am    Gross Description       The specimen is received in formalin, labeled with the patient's name and designated \"right arm,\" and consists of a discoid and partially bosselated mass of glistening, firmly rubbery, light tan tissue 18 x 13 x 6 mm. Projecting from one side of the mass is a cord of firm light tan tissue 10 mm in length and 2 mm in diameter. The main body of the mass is serially sectioned and totally submitted in cassette 1. The projecting cord of tissue is submitted in cassette 2. JPL:[employer]    Charges CPT: 31647  ICD-10: D36.10     Result Flag  Normal      ______________________________________________________________________    Assessment:    1. SK (seborrheic keratosis)    2. Anxiety    3. HTN (hypertension)    4. Mild intermittent asthma    5. Need for influenza vaccination    6. OA (osteoarthritis)    7. AR (allergic rhinitis)    8. Atrophic vaginitis    9. GERD (gastroesophageal reflux disease)    10. Hemorrhoids      ______________________________________________________________________      PHQ-2 Total Score: 0 (10/26/2018  8:00 AM)  No Data Recorded  ______________________________________________________________________    Plan:    1. Seborrheic keratosis treated with liquid nitrogen in a freeze thaw freeze technique.   2. Refilled medications.  3. Flu shot.  4. Follow up with dermatology if skin lesion not resolving.    Zach Canseco MD  General Internal Medicine  [facility]     Return in about 6 months (around 4/26/2019).     Future Appointments  Date Time Provider Department Center   10/30/2018 1:20 PM Chuckie Savage DPM [facility]         ______________________________________________________________________     Relevant ICD-10 codes and order associations:      ICD-10-CM    1. SK (seborrheic keratosis) L82.1    2. Anxiety F41.9 citalopram (CELEXA) 20 MG tablet     DISCONTINUED: citalopram (CELEXA) 20 MG tablet   3. HTN (hypertension) " I10 metoprolol succinate (TOPROL-XL) 50 MG 24 hr tablet     DISCONTINUED: metoprolol succinate (TOPROL-XL) 50 MG 24 hr tablet   4. Mild intermittent asthma J45.20 albuterol (PROAIR HFA) 90 mcg/actuation inhaler     DISCONTINUED: albuterol (PROAIR HFA) 90 mcg/actuation inhaler   5. Need for influenza vaccination Z23    6. OA (osteoarthritis) M19.90    7. AR (allergic rhinitis) J30.9    8. Atrophic vaginitis N95.2    9. GERD (gastroesophageal reflux disease) K21.9    10. Hemorrhoids K64.9

## 2021-06-26 NOTE — PROGRESS NOTES
Progress Notes by Zach Canseco MD at 4/24/2018 11:20 AM     Author: Zach Canseco MD Service: -- Author Type: Physician    Filed: 4/27/2018  8:52 AM Encounter Date: 4/24/2018 Status: Signed    : Zach Canseco MD (Physician)              [facility]    Comprehensive and complex medical care - Chronic disease management - Shared decision making - Care coordination - Compassionate care    Patient advocacy - Rational deprescribing - Minimally disruptive medicine - Ethical focus - Customized care    ______________________________________________________________________     Date of Service: 4/24/2018  Primary Provider: Zach Canseco MD    Patient Care Team:  Zach Canseco MD as PCP - General (Internal Medicine)  David Isabel MD as Physician (Dermatology)  Jeff Roach MD as Physician (Orthopedic Surgery)  Suhail Lundberg MD as Physician (Otolaryngology)  Naomi Saini MD as Physician (Ophthalmology)  Beverly Camacho MD as Physician (Allergy)     ______________________________________________________________________     Patient's Pharmacy:    [facility]/pharmacy #[address] [place] [address] - [address] [place] [address]  Phone: [phone] Fax: [phone]    [facility] - [place] - [address] AT Portal to Registered [facility] Sites  [address] [place] [address]  Phone: [phone] Fax: [phone]     Patient's Contacts:  Name Home Phone Work Phone Mobile Phone Relationship Lgl KENNEY Harris   [phone] Child    PANCHO FINK   [phone] Child      Patient's Insurance:    Payor: BLUE CROSS / Plan: BLUE CROSS PLATINUM / Product Type: COST PLAN /     ______________________________________________________________________     Neena LORENZO Gray is 77 y.o. female who comes in today for:    Chief Complaint   Patient presents with   ? Follow-up     OSTEOARTHRITIS    ? Medication Questions     SHOULD STAY ON PROBIOTICS, SHOULD TAKE SO MUCH CITRACAL   ? OTHER     HAIR AND NAILS ARE SPLITTING       Patient Active Problem List   Diagnosis   ? Atrophic vaginitis   ? GERD (gastroesophageal reflux disease)   ? HTN (hypertension)   ? Mild intermittent asthma   ? AR (allergic rhinitis)   ? Anxiety - Panic attacks and OCD features   ? OA (osteoarthritis)   ? ETD (eustachian tube dysfunction)   ? Former smoker   ? DNR (do not resuscitate)     Current Outpatient Prescriptions   Medication Sig Note   ? albuterol (PROAIR HFA) 90 mcg/actuation inhaler Inhale 2 puffs every 4 (four) hours as needed for wheezing.    ? calcium citrate-vitamin D (CITRACAL+D) 315-200 mg-unit per tablet Take 1 tablet by mouth 2 (two) times a day.    ? citalopram (CELEXA) 20 MG tablet Take 1 tablet (20 mg total) by mouth daily. For anxiety.    ? cycloSPORINE (RESTASIS) 0.05 % ophthalmic emulsion INSTILL ONE DROP IN EACH EYE TWO TIMES A DAY    ? erythromycin ophthalmic ointment APPLY A SMALL AMOUNT IN BOTH EYES ONCE IN THE EVENING 2017: Received from: External Pharmacy   ? estradiol (ESTRACE) 0.01 % (0.1 mg/gram) vaginal cream APPLY ONE GRAM 3 DAYS PER WEEK VAGINALLY (Patient taking differently: 1 g. APPLY ONE GRAM 3 DAYS PER WEEK VAGINALLY)    ? fluticasone (FLONASE) 50 mcg/actuation nasal spray USE TWO SPRAYS IN EACH NOSTRIL EVERY DAY.  For allergies    ? KETOTIFEN FUMARATE (ALAWAY OPHT) Apply 1 drop to eye daily.    ? loratadine (CLARITIN) 10 mg tablet Take 10 mg by mouth daily.    ? metoprolol succinate (TOPROL-XL) 50 MG 24 hr tablet Take 1 tablet at bedtime.  For high blood pressure.    ? multivitamin with minerals (THERA-M) 9 mg iron-400 mcg Tab tablet Take 1 tablet by mouth daily.    ? triamcinolone (KENALOG) 0.1 % cream Apply topically 2 (two) times a day.      Social History     Social History Narrative    ,   at age 89.        3 children.  Former .         Previous patient of Dr. Moore.     ______________________________________________________________________     History of present illness:    Patient comes in today to follow-up a number of issues.    We first reviewed her high blood pressure.  Her blood pressure is doing well.  She has no major concerns related to it.  She denies any lightheadedness or dizziness.    She has had a lot of sinus problems in the months of December to February.  She was having pressure and cough and feeling poorly.  These did eventually go away, but she still has a little bit of sniffling here in there.  This was reviewed with her.    She has a history of hot flushes and this continues for her arm easily.  She is off of oral estrogen and we reviewed a lot of the data behind this.    She is noticing brittle nails.  She also notes dry hair.  We reviewed this with her as well.    Her minimal asthma symptoms are doing well at this point.    She is wondering if she can cut back on her Citracal.  She takes 4 a day.  I do not think she really needs to take this much.  We talked about a lot of the controversy about calcium and vitamin D.    On review of systems, the patient denies any chest pain or shortness of breath.    ______________________________________________________________________    Exam:    Wt Readings from Last 3 Encounters:   04/24/18 126 lb (57.2 kg)   01/02/18 125 lb (56.7 kg)   12/11/17 125 lb 3.2 oz (56.8 kg)     BP Readings from Last 3 Encounters:   04/24/18 110/68   01/02/18 104/62   12/11/17 120/60     /68  Pulse (!) 54  Wt 126 lb (57.2 kg)  LMP  (LMP Unknown)  SpO2 98%  BMI 21.8 kg/m2   The patient is comfortable, no acute distress.  Mood good.  Insight good.  Eyes are nonicteric.  Neck is supple without mass.  No cervical adenopathy.  No thyromegaly. Heart regular rate and rhythm.  Lungs clear to auscultation bilaterally.  Respiratory effort is good.  Abdomen soft and nontender.  No hepatosplenomegaly.   "Extremities no edema.  Nose is clear at this time.  Throat is clear.  She has squaring and obvious CMC thumb arthritis.      ______________________________________________________________________    Diagnostics:    Results for orders placed or performed in visit on 10/30/17   Surgical Pathology Exam   Result Value Ref Range    Case Report       Surgical Pathology Report                         Case: [id number]                                    Authorizing Provider:  Libia Wallace DO          Collected:           10/30/2017 1030              Ordering Location:     [facility] General Surgery Received:            10/30/2017 1217                                     and Bariatrics Care                                                          Pathologist:           Zach Vivas MD                                                       Specimen:    Arm, Right                                                                                 Final Diagnosis       SOFT TISSUE FROM RIGHT ARM, EXCISION:    - CUTANEOUS AND SUBCUTANEOUS NEUROFIBROMA    Microscopic Description       Microscopic examination performed, substantiating the above diagnosis.    Clinical Information       Clinical history:  Mass present for 10 years  Reason for procedure:  Growth  Time placed in formalin:   9:45 am    Gross Description       The specimen is received in formalin, labeled with the patient's name and designated \"right arm,\" and consists of a discoid and partially bosselated mass of glistening, firmly rubbery, light tan tissue 18 x 13 x 6 mm. Projecting from one side of the mass is a cord of firm light tan tissue 10 mm in length and 2 mm in diameter. The main body of the mass is serially sectioned and totally submitted in cassette 1. The projecting cord of tissue is submitted in cassette 2. JPL:[employer]    Charges CPT: 71691  ICD-10: D36.10     Result Flag  Normal    "   ______________________________________________________________________    Assessment:    1. Rhinosinusitis    2. Menopausal hot flushes    3. HTN (hypertension)    4. Anxiety - Panic attacks and OCD features    5. Mild intermittent asthma    6. OA (osteoarthritis)    7. Brittle nails       ______________________________________________________________________      PHQ-2 Total Score: 0 (4/24/2018 11:00 AM)  No Data Recorded  ______________________________________________________________________    Plan:    1. Continue current medications as is.  2. Check blood work in the future.  3. Reduce Citracal to 1 twice a day.  Consider stopping it altogether if she wishes.  4. Continue other medications as is.  5. Anxiety is stable at this point.    Zach Canseco MD  General Internal Medicine  [facility]     Return in about 6 months (around 10/24/2018) for follow up visit.     Future Appointments  Date Time Provider Department Center   10/26/2018 8:25 AM Zach Canseco MD [facility]

## 2021-06-27 NOTE — PROGRESS NOTES
Progress Notes by Zach Canseco MD at 3/18/2019  9:40 AM     Author: Zach Canseco MD Service: -- Author Type: Physician    Filed: 3/20/2019  8:59 PM Encounter Date: 3/18/2019 Status: Signed    : Zach Canseco MD (Physician)              [facility]    Comprehensive and complex medical care - Chronic disease management - Shared decision making - Care coordination - Compassionate care    Patient advocacy - Rational deprescribing - Minimally disruptive medicine - Ethical focus - Customized care    ______________________________________________________________________     Date of Service: 3/18/2019  Primary Provider: Zach Canseco MD    Patient Care Team:  Zach Canseco MD as PCP - General (Internal Medicine)  David Isabel MD as Physician (Dermatology)  Marley, Jeff ALEGRIA MD as Physician (Orthopedic Surgery)  Service, Suhail Boyd MD as Physician (Otolaryngology)  Naomi Saini MD as Physician (Ophthalmology)  Camacho, Beverly Lema MD as Physician (Allergy)     ______________________________________________________________________     Patient's Pharmacy:    [facility]/pharmacy #[id number] [place] [address]  Phone: [phone] Fax: [phone]    [facility] [place] - [address] AT Portal to Registered [facility] Sites  [address] [place] [address]  Phone: [phone] Fax: [phone]     Patient's Contacts:  Name Home Phone Work Phone Mobile Phone Relationship Lgl Grd   KENNEY NEWTON   [phone] Child    PANCHO FINK   [phone] Child      Patient's Insurance:    Payor: BLUE CROSS / Plan: BLUE CROSS MEDICARE ADVANTAGE / Product Type: MEDICARE ADVANTAGE /     ______________________________________________________________________     Neena LORENZO Gray is 78 y.o. female who comes in today for:    Chief Complaint   Patient presents with   ?  Follow-up     6 month check. Discuss taking probiotics.        Active Problem List:  Problem List as of 3/18/2019 Reviewed: 3/17/2019  8:58 PM by Zach Canseco MD       High    DNR (do not resuscitate)    Former smoker       Medium    HTN (hypertension)    Anxiety - Panic attacks and OCD features    Mild intermittent asthma    OA (osteoarthritis)       Low    AR (allergic rhinitis)    Atrophic vaginitis    ETD (eustachian tube dysfunction)    GERD (gastroesophageal reflux disease)           Current Outpatient Medications   Medication Sig Note   ? albuterol (PROAIR HFA;PROVENTIL HFA;VENTOLIN HFA) 90 mcg/actuation inhaler Inhale 2 puffs every 4 (four) hours as needed for wheezing May substitute the equivalent medication per insurance preference..    ? azithromycin (ZITHROMAX Z-MARIA ISABEL) 250 MG tablet 2 tablets today and then 1 pill a day for 4 days.    ? calcium citrate-vitamin D (CITRACAL+D) 315-200 mg-unit per tablet Take 1 tablet by mouth daily.    ? citalopram (CELEXA) 20 MG tablet Take 1 tablet (20 mg total) by mouth daily. For anxiety.    ? cycloSPORINE (RESTASIS) 0.05 % ophthalmic emulsion INSTILL ONE DROP IN EACH EYE TWO TIMES A DAY    ? erythromycin ophthalmic ointment APPLY A SMALL AMOUNT IN BOTH EYES ONCE IN THE EVENING 12/11/2017: Received from: External Pharmacy   ? estradiol (ESTRACE) 0.01 % (0.1 mg/gram) vaginal cream APPLY ONE GRAM 3 DAYS PER WEEK VAGINALLY    ? fluticasone (FLONASE) 50 mcg/actuation nasal spray USE TWO SPRAYS IN EACH NOSTRIL EVERY DAY.  For allergies    ? glycerin/min oil/polycarbophil (REPLENS VAGL) Insert into the vagina. As needed.    ? hydrocortisone (ANUSOL-HC) 2.5 % rectal cream Apply rectally 2 times daily as needed    ? KETOTIFEN FUMARATE (ALAWAY OPHT) Apply 1 drop to eye daily.    ? loratadine (CLARITIN) 10 mg tablet Take 10 mg by mouth daily.    ? metoprolol succinate (TOPROL-XL) 50 MG 24 hr tablet Take 1 tablet at bedtime.  For high blood pressure.    ? multivitamin with  minerals (THERA-M) 9 mg iron-400 mcg Tab tablet Take 1 tablet by mouth daily.    ? ranitidine (ZANTAC) 150 MG tablet Take 150 mg by mouth 2 (two) times a day as needed for heartburn.    ? tretinoin (RETIN-A) 0.05 % cream Apply topically at bedtime. To affected area.    ? triamcinolone (KENALOG) 0.1 % cream Apply topically 2 (two) times a day.    ? bimatoprost (LATISSE) 0.03 % ophthalmic solution APPLY ONE DROP VIA BRUSH TO UPPER LASHES AT BEDTIME, BOTH EYES      Social History     Social History Narrative    ,   at age 89.        3 children.  Former .        Previous patient of Dr. Moore.     ______________________________________________________________________     History of present illness:    Recently went to [facility].    On replens and working off estrogen for atrophic vaginitis at this time.  Doing well.    Reviewed the patient's allergies and they are doing okay. She is still having intermittent issues with eustachian tube dysfunction (ETD) of the left ear for about 6 months.  Has seen otolaryngology (ENT) and is not sure if she wants to go back yet.    Hemorrhoid is doing better,    On flight back home from [facility], did have short lived ankle edema which went away the next day and not associated with other symptoms.    Has some issues with gurgly stomach especially at night.  No abdominal pain with this.  No fever or chills or nausea or vomiting.  Does get intermittent alternating diarrhea and constipation.  Colonoscopy in 2012 normal.  Does not want to do further work-up for this at this time.    We reviewed her other issues noted in the assessment but not specifically addressed in the HPI above.     On review of systems, the patient denies any chest pain or shortness of breath.    ______________________________________________________________________    Exam:    Wt Readings from Last 3 Encounters:   19 125 lb (56.7 kg)   19 127 lb (57.6 kg)   18  "127 lb 4.8 oz (57.7 kg)     BP Readings from Last 3 Encounters:   03/18/19 118/72   01/17/19 118/60   12/26/18 116/78     /72   Pulse (!) 56   Ht 5' 3.75\" (1.619 m)   Wt 125 lb (56.7 kg)   LMP  (LMP Unknown)   SpO2 98%   BMI 21.62 kg/m      The patient is comfortable, no acute distress.  Mood good.  Insight good.  Eyes are nonicteric. There is some fluid behind the left TM.  There is bluish rhinitis in the nose. Neck is supple without mass.  No cervical adenopathy.  No thyromegaly. Heart regular rate and rhythm.  Lungs clear to auscultation bilaterally.  Respiratory effort is good.  Abdomen soft and nontender.  No hepatosplenomegaly.  Extremities no edema.      ______________________________________________________________________    Diagnostics:    Results for orders placed or performed in visit on 01/17/19   Wet Prep, Vaginal   Result Value Ref Range    Yeast Result No yeast seen No yeast seen    Trichomonas No Trichomonas seen No Trichomonas seen    Clue Cells, Wet Prep No Clue cells seen No Clue cells seen     ______________________________________________________________________    Assessment:    1. Essential hypertension    2. Age related osteoporosis, unspecified pathological fracture presence    3. Allergic rhinitis, unspecified seasonality, unspecified trigger    4. Atrophic vaginitis    5. Gastroesophageal reflux disease without esophagitis    6. Hemorrhoids, unspecified hemorrhoid type    7. Anxiety    8. Recurrent sinusitis    9. Bilateral lower extremity edema    10. Dysfunction of left eustachian tube       ______________________________________________________________________     PHQ-2 Total Score: 0 (3/18/2019  9:00 AM)    No Data Recorded  ______________________________________________________________________       Plan:    1. Blood work NEXT VISIT.  2. Consider montelukast (Singulair) for AR and sinus issues.  3. Refilled medications.  4. Follow leg swelling as resolved " quickly.  5. Follow up bone density scan (DEXA).  6. Consider ENT reevaluation for her eustachian tube dysfunction (ETD) if needed.         Zach Canseco MD  General Internal Medicine  [facility]     Personal office fax - [phone]   Voice mail - [phone]  E-mail - [email or web address]      Return in about 6 months (around 9/18/2019).     Future Appointments   Date Time Provider Department Center   4/5/2019 10:15 AM [facility] 2 OPS [facility]   9/16/2019  8:50 AM [facility]        ______________________________________________________________________       Patient Instructions   The new shingles shot (Shingrix) is 2 separate shots  by 2-6 months.    The cost out of pocket is around $390 for the 2 shots, so you might want to see if your insurance covers it or a portion of it prior to having it done.  Often by having it done at a pharmacy rather than a clinic, it can be cheaper for you.    It is estimated that any person's risk of shingles over their lifetime is around 10-20%.    The old shingles vaccine (Zostavax) is about 50% effective, reducing your risk of shingles to about 5-10% over your lifetime.    The new shot is 90% effective, reducing your risk of shingles to about 1-2% over your lifetime.    Because the shot is strong, it is very common to have flu like symptoms for 2-3 days after the shot.  25-50% of patients will have fever, muscle aches or headache.    I believe that whether or not you have this vaccine is your own decision depending on your values and preferences.  The information above I give you to make an informed decision.    Zach Canseco MD  [facility]    ______________________________________________________________________      Advance Medical Directive    An advance medical directive is a form that lets you plan ahead for the care youd want if you could no longer express your wishes. This  statement outlines the medical treatment youd want or names the person youd wish to make healthcare decisions for you. Be aware that laws vary from state to state, and it may be worthwhile to talk with an .  Writing down your wishes    An advance directive is important whether youre young or old. Injury or illness can strike at any age.    Decide what is important to you and the kind of treatment youd want, or not want to have.    Some states allow only one kind of advance directive. Some let you do both a Durable Power of  for Health Care and a Living Will. Some states put both kinds on the same form.  A Durable Power of  for Health Care    This form lets you name someone else to be your agent.    This person can decide on treatment for you only when you cant speak for yourself.    You do not need to be at the end of your life. He or she could speak for you if you were in a coma but were likely to recover.  A Living Will    This form lets you list the care you want at the end of your life.    A living will applies only if you wont live without medical treatment. It would apply if you had advanced cancer, a massive stroke, or other serious illness from which you will not recover.    It takes effect only when you can no longer express your wishes yourself.  Date Last Reviewed: 2/13/2016 2000-2017 [employer]. [address] [place] [address]. All rights reserved. This information is not intended as a substitute for professional medical care. Always follow your healthcare professional's instructions.  All       ______________________________________________________________________    Relevant ICD-10 codes and order associations:      ICD-10-CM    1. Essential hypertension I10    2. Age related osteoporosis, unspecified pathological fracture presence M81.0 DXA Bone Density Scan     DXA Bone Density Scan     calcium citrate-vitamin D (CITRACAL+D) 315-200 mg-unit per  tablet   3. Allergic rhinitis, unspecified seasonality, unspecified trigger J30.9    4. Atrophic vaginitis N95.2 glycerin/min oil/polycarbophil (REPLENS VAGL)   5. Gastroesophageal reflux disease without esophagitis K21.9 ranitidine (ZANTAC) 150 MG tablet   6. Hemorrhoids, unspecified hemorrhoid type K64.9    7. Anxiety F41.9    8. Recurrent sinusitis J32.9    9. Bilateral lower extremity edema R60.0    10. Dysfunction of left eustachian tube H69.82

## 2021-06-28 NOTE — PROGRESS NOTES
Progress Notes by Zach Canseco MD at 9/16/2019  8:50 AM     Author: Zach Canseco MD Service: -- Author Type: Physician    Filed: 9/16/2019  3:46 PM Encounter Date: 9/16/2019 Status: Signed    : Zach Canseco MD (Physician)              [facility]    Comprehensive and complex medical care - Chronic disease management - Shared decision making - Care coordination - Compassionate care    Patient advocacy - Rational deprescribing - Minimally disruptive medicine - Ethical focus - Customized care    ______________________________________________________________________     Date of Service: 9/16/2019  Primary Provider: Zach Canseco MD    Patient Care Team:  Zach Canseco MD as PCP - General (Internal Medicine)  David Isabel MD as Physician (Dermatology)  Marley, Jeff ALEGRIA MD as Physician (Orthopedic Surgery)  Service, Suhail Boyd MD as Physician (Otolaryngology)  Naomi Saini MD as Physician (Ophthalmology)  Doug, Beverly Lema MD as Physician (Allergy)  Zach Canseco MD as Assigned PCP     ______________________________________________________________________     Patient's Pharmacy:    [facility] - [place], [place] - [address] AT Portal to Registered [facility] Sites  [address] [place] [address]  Phone: [phone] Fax: [phone]     Patient's Contacts:  Name Home Phone Work Phone Mobile Phone Relationship [facility] HealthKENNEY An   [phone] Child    PANCHO FINK   [phone] Child      Patient's Insurance:    Payor: BLUE CROSS / Plan: BLUE CROSS MEDICARE ADVANTAGE / Product Type: MEDICARE ADVANTAGE /   ______________________________________________________________________   ______________________________________________________________________     Neena LORENZO Gray is a 78 y.o. female who comes in today for:    Chief Complaint   Patient presents with   ? Follow-up     STILL HAS YULIYARGLY  STOMACH, DOES NOT REALLY HAVE DIARRHEA, HAS CONSTIPATION BUT NOT TO MUCH, EUSTACHIAN TUBE DYSFUNCTION   ? Mass     LEFT RIGHT WRIST HAS HAD A LONG TIME JUST STARTED HURTING HAS BEEN GETTING BIGGER       Active Problem List:  Problem List as of 9/16/2019 Reviewed: 3/17/2019  8:58 PM by Zach Canseco MD       High    DNR (do not resuscitate)    Former smoker       Medium    HTN (hypertension)    Anxiety - Panic attacks and OCD features    Mild intermittent asthma    OA (osteoarthritis)       Low    AR (allergic rhinitis)    Atrophic vaginitis    ETD (eustachian tube dysfunction)    GERD (gastroesophageal reflux disease)           Current Outpatient Medications   Medication Sig Note   ? albuterol (PROAIR HFA;PROVENTIL HFA;VENTOLIN HFA) 90 mcg/actuation inhaler Inhale 2 puffs every 4 (four) hours as needed for wheezing May substitute the equivalent medication per insurance preference..    ? bimatoprost (LATISSE) 0.03 % ophthalmic solution APPLY ONE DROP VIA BRUSH TO UPPER LASHES AT BEDTIME, BOTH EYES    ? calcium citrate-vitamin D (CITRACAL+D) 315-200 mg-unit per tablet Take 1 tablet by mouth daily.    ? citalopram (CELEXA) 20 MG tablet Take 1 tablet (20 mg total) by mouth daily. For anxiety.    ? cycloSPORINE (RESTASIS) 0.05 % ophthalmic emulsion INSTILL ONE DROP IN EACH EYE TWO TIMES A DAY    ? erythromycin ophthalmic ointment APPLY A SMALL AMOUNT IN BOTH EYES ONCE IN THE EVENING 12/11/2017: Received from: External Pharmacy   ? estradiol (ESTRACE) 0.01 % (0.1 mg/gram) vaginal cream APPLY ONE GRAM 3 DAYS PER WEEK VAGINALLY    ? fluticasone propionate (FLONASE) 50 mcg/actuation nasal spray USE 2 SPRAYS IN EACH       NOSTRIL DAILY FOR ALLERGIES    ? glycerin/min oil/polycarbophil (REPLENS VAGL) Insert into the vagina. As needed.    ? hydrocortisone (ANUSOL-HC) 2.5 % rectal cream Apply rectally 2 times daily as needed    ? KETOTIFEN FUMARATE (ALAWAY OPHT) Apply 1 drop to eye daily.    ? loratadine (CLARITIN) 10 mg  tablet Take 10 mg by mouth daily.    ? metoprolol succinate (TOPROL-XL) 50 MG 24 hr tablet Take 1 tablet at bedtime.  For high blood pressure.    ? multivitamin with minerals (THERA-M) 9 mg iron-400 mcg Tab tablet Take 1 tablet by mouth daily.    ? ranitidine (ZANTAC) 150 MG tablet Take 150 mg by mouth 2 (two) times a day as needed for heartburn.    ? tretinoin (RETIN-A) 0.05 % cream Apply topically at bedtime. To affected area.    ? triamcinolone (KENALOG) 0.1 % cream Apply topically 2 (two) times a day.    ? neomycin-polymyxin-hydrocortisone (CORTISPORIN) otic solution Administer 3 drops into both ears 3 (three) times a day as needed. USE AS NEEDED FOR ITCHY EARS.    ? tretinoin (RETIN-A) 0.025 % cream APPLY TO AFFECTED AREA EVERY DAY AT BEDTIME      Social History     Social History Narrative    ,   at age 89.        3 children.  Former .        Previous patient of Dr. Moore.     ______________________________________________________________________     History of present illness:    Patient comes in today for follow-up.    We first reviewed her left wrist pain.  She has had a ganglion cyst here previously.  The pain has worsened near the ganglion cyst and the ganglion cyst has increased in size.  She would like to have an orthopedic referral for removal of the cyst and further evaluation.  She has had no trauma related to this.    We reviewed her high blood pressure and her blood pressure has been doing well without issues.  She remains on her medications for this.  She has no major concerns related to this.    We reviewed her reflux disease.  This is stable at this time.    We reviewed her itching of her ears.  She is had some itching problems more recently.  She occasionally gets some pain in the right ear.  She has seen ENT in the past.  They were really unable to help her.  She would like to consider a drop related to this.    Reviewed her anxiety and the  citalopram is doing okay for her.    She recently moved to [place] to an apartment to be closer to her family who lives in the [place] area.  She will likely be switching clinics at  the next visit.    We reviewed her other issues noted in the assessment but not specifically addressed in the HPI above.     On review of systems, the patient denies any chest pain or shortness of breath.    ______________________________________________________________________    Exam:    Wt Readings from Last 3 Encounters:   09/16/19 122 lb (55.3 kg)   06/25/19 122 lb 12.8 oz (55.7 kg)   03/18/19 125 lb (56.7 kg)     BP Readings from Last 3 Encounters:   09/16/19 128/76   06/25/19 134/71   03/18/19 118/72     /76   Pulse (!) 52   Wt 122 lb (55.3 kg)   LMP  (LMP Unknown)   SpO2 97%   BMI 21.11 kg/m      The patient is comfortable, no acute distress.  Mood slightly anxious to good.  Insight good.  Eyes are nonicteric.  Neck is supple without mass.  No cervical adenopathy.  No thyromegaly. Heart regular rate and rhythm.  Lungs clear to auscultation bilaterally.  Respiratory effort is good.  Abdomen soft and nontender.  No hepatosplenomegaly.  Extremities no edema.  There is a left-sided ganglion cyst which is tender to palpation.  There is no synovitis of the wrists noted.  Her range of motion in the wrist is good.  She has some flaking of her ear canals bilaterally but no other abnormalities.    ______________________________________________________________________    Diagnostics:    Results for orders placed or performed in visit on 09/16/19   HM2(CBC w/o Differential)   Result Value Ref Range    WBC 5.1 4.0 - 11.0 thou/uL    RBC 4.34 3.80 - 5.40 mill/uL    Hemoglobin 13.7 12.0 - 16.0 g/dL    Hematocrit 39.9 35.0 - 47.0 %    MCV 92 80 - 100 fL    MCH 31.5 27.0 - 34.0 pg    MCHC 34.3 32.0 - 36.0 g/dL    RDW 12.1 11.0 - 14.5 %    Platelets 225 140 - 440 thou/uL    MPV 7.8 7.0 - 10.0 fL      ______________________________________________________________________    Assessment:    1. Mild intermittent asthma without complication    2. Atrophic vaginitis    3. Essential hypertension    4. Anxiety    5. Primary osteoarthritis involving multiple joints    6. Itching of ear    7. Gastroesophageal reflux disease, esophagitis presence not specified    8. Left wrist pain    9. Ganglion cyst of wrist, left       ______________________________________________________________________     PHQ-2 Total Score: 0 (9/16/2019  9:00 AM)    No data recorded  ______________________________________________________________________     BMI Readings from Last 1 Encounters:   09/16/19 21.11 kg/m      ______________________________________________________________________       Plan:    1. Referral to Dr. Lloyd Young at [facility] orthopedics for her wrist cyst and pain.  2. Continue estrogen cream for atrophic vaginal symptoms.  She uses it maybe once a week.  3. Refilled other medications.  4. Trial of Cortisporin otic to be used as needed for itching of the ear.  Could consider referring her to [facility] in [facility] for further evaluation if needed.  5. Blood work done today.  6. The patient would be an adequate candidate for surgery if surgery required.  She could take her usual medications with a sip of water prior to the surgery.         Zach Canseco MD  General Internal Medicine  [facility]     Personal office fax - [phone]   Voice mail - [phone]  E-mail - [email or web address]      Return in about 6 months (around 3/16/2020).     No future appointments.     ______________________________________________________________________    Relevant ICD-10 codes and order associations:      ICD-10-CM    1. Mild intermittent asthma without complication J45.20    2. Atrophic vaginitis N95.2 estradiol (ESTRACE) 0.01 % (0.1 mg/gram) vaginal cream   3. Essential hypertension I10  metoprolol succinate (TOPROL-XL) 50 MG 24 hr tablet     Comprehensive Metabolic Panel     HM2(CBC w/o Differential)   4. Anxiety F41.9 citalopram (CELEXA) 20 MG tablet   5. Primary osteoarthritis involving multiple joints M15.0    6. Itching of ear L29.9 neomycin-polymyxin-hydrocortisone (CORTISPORIN) otic solution   7. Gastroesophageal reflux disease, esophagitis presence not specified K21.9 Comprehensive Metabolic Panel   8. Left wrist pain M25.532 Ambulatory referral to Orthopedics   9. Ganglion cyst of wrist, left M67.432 Ambulatory referral to Orthopedics

## 2021-07-13 ENCOUNTER — RECORDS - HEALTHEAST (OUTPATIENT)
Dept: ADMINISTRATIVE | Facility: CLINIC | Age: 81
End: 2021-07-13

## 2021-07-21 ENCOUNTER — RECORDS - HEALTHEAST (OUTPATIENT)
Dept: ADMINISTRATIVE | Facility: CLINIC | Age: 81
End: 2021-07-21

## 2021-10-10 ENCOUNTER — HEALTH MAINTENANCE LETTER (OUTPATIENT)
Age: 81
End: 2021-10-10

## 2022-01-30 ENCOUNTER — HEALTH MAINTENANCE LETTER (OUTPATIENT)
Age: 82
End: 2022-01-30

## 2022-09-18 ENCOUNTER — HEALTH MAINTENANCE LETTER (OUTPATIENT)
Age: 82
End: 2022-09-18

## 2022-11-01 ENCOUNTER — APPOINTMENT (OUTPATIENT)
Dept: CT IMAGING | Facility: CLINIC | Age: 82
DRG: 023 | End: 2022-11-01
Attending: NURSE PRACTITIONER
Payer: COMMERCIAL

## 2022-11-01 ENCOUNTER — APPOINTMENT (OUTPATIENT)
Dept: CT IMAGING | Facility: CLINIC | Age: 82
DRG: 023 | End: 2022-11-01
Attending: EMERGENCY MEDICINE
Payer: COMMERCIAL

## 2022-11-01 ENCOUNTER — APPOINTMENT (OUTPATIENT)
Dept: INTERVENTIONAL RADIOLOGY/VASCULAR | Facility: CLINIC | Age: 82
DRG: 023 | End: 2022-11-01
Payer: COMMERCIAL

## 2022-11-01 ENCOUNTER — APPOINTMENT (OUTPATIENT)
Dept: MRI IMAGING | Facility: CLINIC | Age: 82
DRG: 023 | End: 2022-11-01
Attending: INTERNAL MEDICINE
Payer: COMMERCIAL

## 2022-11-01 ENCOUNTER — HOSPITAL ENCOUNTER (INPATIENT)
Facility: CLINIC | Age: 82
LOS: 4 days | Discharge: HOME HEALTH | DRG: 023 | End: 2022-11-05
Attending: EMERGENCY MEDICINE | Admitting: INTERNAL MEDICINE
Payer: COMMERCIAL

## 2022-11-01 ENCOUNTER — TRANSFERRED RECORDS (OUTPATIENT)
Dept: HEALTH INFORMATION MANAGEMENT | Facility: CLINIC | Age: 82
End: 2022-11-01

## 2022-11-01 DIAGNOSIS — I63.9 ACUTE CVA (CEREBROVASCULAR ACCIDENT) (H): Primary | ICD-10-CM

## 2022-11-01 DIAGNOSIS — K21.9 GASTROESOPHAGEAL REFLUX DISEASE, UNSPECIFIED WHETHER ESOPHAGITIS PRESENT: ICD-10-CM

## 2022-11-01 DIAGNOSIS — I63.9 CEREBROVASCULAR ACCIDENT (CVA), UNSPECIFIED MECHANISM (H): ICD-10-CM

## 2022-11-01 LAB
ANION GAP SERPL CALCULATED.3IONS-SCNC: 12 MMOL/L (ref 3–14)
APTT PPP: 24 SECONDS (ref 22–38)
ATRIAL RATE - MUSE: 51 BPM
BASOPHILS # BLD AUTO: 0 10E3/UL (ref 0–0.2)
BASOPHILS NFR BLD AUTO: 0 %
BUN SERPL-MCNC: 27 MG/DL (ref 7–30)
CALCIUM SERPL-MCNC: 9.9 MG/DL (ref 8.5–10.1)
CHLORIDE BLD-SCNC: 100 MMOL/L (ref 94–109)
CHOLEST SERPL-MCNC: 164 MG/DL
CO2 SERPL-SCNC: 24 MMOL/L (ref 20–32)
CREAT SERPL-MCNC: 0.94 MG/DL (ref 0.52–1.04)
DIASTOLIC BLOOD PRESSURE - MUSE: NORMAL MMHG
EOSINOPHIL # BLD AUTO: 0 10E3/UL (ref 0–0.7)
EOSINOPHIL NFR BLD AUTO: 0 %
ERYTHROCYTE [DISTWIDTH] IN BLOOD BY AUTOMATED COUNT: 13.2 % (ref 10–15)
GFR SERPL CREATININE-BSD FRML MDRD: 60 ML/MIN/1.73M2
GLUCOSE BLD-MCNC: 106 MG/DL (ref 70–99)
GLUCOSE BLDC GLUCOMTR-MCNC: 102 MG/DL (ref 70–99)
GLUCOSE BLDC GLUCOMTR-MCNC: 84 MG/DL (ref 70–99)
HBA1C MFR BLD: 5.9 % (ref 0–5.6)
HCT VFR BLD AUTO: 44.7 % (ref 35–47)
HDLC SERPL-MCNC: 53 MG/DL
HGB BLD-MCNC: 14.4 G/DL (ref 11.7–15.7)
IMM GRANULOCYTES # BLD: 0 10E3/UL
IMM GRANULOCYTES NFR BLD: 0 %
INR PPP: 1.01 (ref 0.85–1.15)
INTERPRETATION ECG - MUSE: NORMAL
LDLC SERPL CALC-MCNC: 79 MG/DL
LYMPHOCYTES # BLD AUTO: 1.1 10E3/UL (ref 0.8–5.3)
LYMPHOCYTES NFR BLD AUTO: 17 %
MCH RBC QN AUTO: 30.6 PG (ref 26.5–33)
MCHC RBC AUTO-ENTMCNC: 32.2 G/DL (ref 31.5–36.5)
MCV RBC AUTO: 95 FL (ref 78–100)
MONOCYTES # BLD AUTO: 0.5 10E3/UL (ref 0–1.3)
MONOCYTES NFR BLD AUTO: 7 %
NEUTROPHILS # BLD AUTO: 4.9 10E3/UL (ref 1.6–8.3)
NEUTROPHILS NFR BLD AUTO: 76 %
NONHDLC SERPL-MCNC: 111 MG/DL
NRBC # BLD AUTO: 0 10E3/UL
NRBC BLD AUTO-RTO: 0 /100
P AXIS - MUSE: 62 DEGREES
PLATELET # BLD AUTO: 280 10E3/UL (ref 150–450)
POTASSIUM BLD-SCNC: 4.4 MMOL/L (ref 3.4–5.3)
PR INTERVAL - MUSE: 182 MS
QRS DURATION - MUSE: 84 MS
QT - MUSE: 462 MS
QTC - MUSE: 425 MS
R AXIS - MUSE: 35 DEGREES
RBC # BLD AUTO: 4.7 10E6/UL (ref 3.8–5.2)
SODIUM SERPL-SCNC: 136 MMOL/L (ref 133–144)
SYSTOLIC BLOOD PRESSURE - MUSE: NORMAL MMHG
T AXIS - MUSE: 63 DEGREES
TRIGL SERPL-MCNC: 162 MG/DL
TROPONIN I SERPL HS-MCNC: 8 NG/L
VENTRICULAR RATE- MUSE: 51 BPM
WBC # BLD AUTO: 6.5 10E3/UL (ref 4–11)

## 2022-11-01 PROCEDURE — C1757 CATH, THROMBECTOMY/EMBOLECT: HCPCS

## 2022-11-01 PROCEDURE — 93005 ELECTROCARDIOGRAM TRACING: CPT

## 2022-11-01 PROCEDURE — 272N000567 HC SHEATH CR4

## 2022-11-01 PROCEDURE — 80061 LIPID PANEL: CPT | Performed by: INTERNAL MEDICINE

## 2022-11-01 PROCEDURE — 99291 CRITICAL CARE FIRST HOUR: CPT | Mod: 25

## 2022-11-01 PROCEDURE — 272N000192 HC ACCESSORY CR2

## 2022-11-01 PROCEDURE — 99292 CRITICAL CARE ADDL 30 MIN: CPT

## 2022-11-01 PROCEDURE — C1769 GUIDE WIRE: HCPCS

## 2022-11-01 PROCEDURE — 250N000009 HC RX 250: Performed by: EMERGENCY MEDICINE

## 2022-11-01 PROCEDURE — 272N000116 HC CATH CR1

## 2022-11-01 PROCEDURE — 80048 BASIC METABOLIC PNL TOTAL CA: CPT | Performed by: EMERGENCY MEDICINE

## 2022-11-01 PROCEDURE — 200N000001 HC R&B ICU

## 2022-11-01 PROCEDURE — 70450 CT HEAD/BRAIN W/O DYE: CPT

## 2022-11-01 PROCEDURE — 03CL3ZZ EXTIRPATION OF MATTER FROM LEFT INTERNAL CAROTID ARTERY, PERCUTANEOUS APPROACH: ICD-10-PCS | Performed by: RADIOLOGY

## 2022-11-01 PROCEDURE — 84484 ASSAY OF TROPONIN QUANT: CPT | Performed by: EMERGENCY MEDICINE

## 2022-11-01 PROCEDURE — 99152 MOD SED SAME PHYS/QHP 5/>YRS: CPT

## 2022-11-01 PROCEDURE — 36415 COLL VENOUS BLD VENIPUNCTURE: CPT | Performed by: EMERGENCY MEDICINE

## 2022-11-01 PROCEDURE — 99223 1ST HOSP IP/OBS HIGH 75: CPT | Mod: AI | Performed by: INTERNAL MEDICINE

## 2022-11-01 PROCEDURE — 70450 CT HEAD/BRAIN W/O DYE: CPT | Mod: 77

## 2022-11-01 PROCEDURE — C1887 CATHETER, GUIDING: HCPCS

## 2022-11-01 PROCEDURE — 99291 CRITICAL CARE FIRST HOUR: CPT | Performed by: PSYCHIATRY & NEUROLOGY

## 2022-11-01 PROCEDURE — 85025 COMPLETE CBC W/AUTO DIFF WBC: CPT | Performed by: EMERGENCY MEDICINE

## 2022-11-01 PROCEDURE — B3171ZZ FLUOROSCOPY OF LEFT INTERNAL CAROTID ARTERY USING LOW OSMOLAR CONTRAST: ICD-10-PCS | Performed by: RADIOLOGY

## 2022-11-01 PROCEDURE — 255N000002 HC RX 255 OP 636: Performed by: RADIOLOGY

## 2022-11-01 PROCEDURE — 70498 CT ANGIOGRAPHY NECK: CPT

## 2022-11-01 PROCEDURE — 70551 MRI BRAIN STEM W/O DYE: CPT

## 2022-11-01 PROCEDURE — 61645 PERQ ART M-THROMBECT &/NFS: CPT | Mod: GC | Performed by: RADIOLOGY

## 2022-11-01 PROCEDURE — 70496 CT ANGIOGRAPHY HEAD: CPT

## 2022-11-01 PROCEDURE — 272N000196 HC ACCESSORY CR5

## 2022-11-01 PROCEDURE — 99152 MOD SED SAME PHYS/QHP 5/>YRS: CPT | Mod: GC | Performed by: RADIOLOGY

## 2022-11-01 PROCEDURE — 250N000009 HC RX 250: Performed by: STUDENT IN AN ORGANIZED HEALTH CARE EDUCATION/TRAINING PROGRAM

## 2022-11-01 PROCEDURE — 85610 PROTHROMBIN TIME: CPT | Performed by: EMERGENCY MEDICINE

## 2022-11-01 PROCEDURE — 83036 HEMOGLOBIN GLYCOSYLATED A1C: CPT | Performed by: INTERNAL MEDICINE

## 2022-11-01 PROCEDURE — 250N000011 HC RX IP 250 OP 636: Performed by: STUDENT IN AN ORGANIZED HEALTH CARE EDUCATION/TRAINING PROGRAM

## 2022-11-01 PROCEDURE — 0042T CT HEAD PERFUSION W CONTRAST: CPT

## 2022-11-01 PROCEDURE — 250N000011 HC RX IP 250 OP 636: Performed by: INTERNAL MEDICINE

## 2022-11-01 PROCEDURE — 85730 THROMBOPLASTIN TIME PARTIAL: CPT | Performed by: EMERGENCY MEDICINE

## 2022-11-01 PROCEDURE — 250N000013 HC RX MED GY IP 250 OP 250 PS 637: Performed by: INTERNAL MEDICINE

## 2022-11-01 PROCEDURE — 250N000011 HC RX IP 250 OP 636: Performed by: EMERGENCY MEDICINE

## 2022-11-01 PROCEDURE — 258N000003 HC RX IP 258 OP 636: Performed by: INTERNAL MEDICINE

## 2022-11-01 RX ORDER — ATORVASTATIN CALCIUM 10 MG/1
10 TABLET, FILM COATED ORAL DAILY
Status: ON HOLD | COMMUNITY
End: 2022-11-05

## 2022-11-01 RX ORDER — BENZOCAINE/MENTHOL 6 MG-10 MG
LOZENGE MUCOUS MEMBRANE 2 TIMES DAILY PRN
COMMUNITY

## 2022-11-01 RX ORDER — CLOBETASOL PROPIONATE 0.5 MG/G
OINTMENT TOPICAL 2 TIMES DAILY PRN
COMMUNITY

## 2022-11-01 RX ORDER — ONDANSETRON 4 MG/1
4 TABLET, ORALLY DISINTEGRATING ORAL EVERY 6 HOURS PRN
Status: DISCONTINUED | OUTPATIENT
Start: 2022-11-01 | End: 2022-11-05 | Stop reason: HOSPADM

## 2022-11-01 RX ORDER — CYCLOSPORINE 0.5 MG/ML
1 EMULSION OPHTHALMIC 2 TIMES DAILY
Status: DISCONTINUED | OUTPATIENT
Start: 2022-11-01 | End: 2022-11-01

## 2022-11-01 RX ORDER — SODIUM CHLORIDE 9 MG/ML
INJECTION, SOLUTION INTRAVENOUS CONTINUOUS
Status: DISCONTINUED | OUTPATIENT
Start: 2022-11-01 | End: 2022-11-03

## 2022-11-01 RX ORDER — KETOCONAZOLE 20 MG/G
CREAM TOPICAL 2 TIMES DAILY PRN
COMMUNITY

## 2022-11-01 RX ORDER — IOPAMIDOL 612 MG/ML
150 INJECTION, SOLUTION INTRAVASCULAR ONCE
Status: COMPLETED | OUTPATIENT
Start: 2022-11-01 | End: 2022-11-01

## 2022-11-01 RX ORDER — LIDOCAINE 40 MG/G
CREAM TOPICAL
Status: DISCONTINUED | OUTPATIENT
Start: 2022-11-01 | End: 2022-11-05 | Stop reason: HOSPADM

## 2022-11-01 RX ORDER — NALOXONE HYDROCHLORIDE 0.4 MG/ML
0.4 INJECTION, SOLUTION INTRAMUSCULAR; INTRAVENOUS; SUBCUTANEOUS
Status: DISCONTINUED | OUTPATIENT
Start: 2022-11-01 | End: 2022-11-01 | Stop reason: HOSPADM

## 2022-11-01 RX ORDER — FENTANYL CITRATE 50 UG/ML
25-50 INJECTION, SOLUTION INTRAMUSCULAR; INTRAVENOUS EVERY 5 MIN PRN
Status: DISCONTINUED | OUTPATIENT
Start: 2022-11-01 | End: 2022-11-01 | Stop reason: HOSPADM

## 2022-11-01 RX ORDER — IOPAMIDOL 755 MG/ML
125 INJECTION, SOLUTION INTRAVASCULAR ONCE
Status: COMPLETED | OUTPATIENT
Start: 2022-11-01 | End: 2022-11-01

## 2022-11-01 RX ORDER — NALOXONE HYDROCHLORIDE 0.4 MG/ML
0.2 INJECTION, SOLUTION INTRAMUSCULAR; INTRAVENOUS; SUBCUTANEOUS
Status: DISCONTINUED | OUTPATIENT
Start: 2022-11-01 | End: 2022-11-01 | Stop reason: HOSPADM

## 2022-11-01 RX ORDER — SODIUM CHLORIDE 9 MG/ML
INJECTION, SOLUTION INTRAVENOUS CONTINUOUS
Status: DISCONTINUED | OUTPATIENT
Start: 2022-11-01 | End: 2022-11-01 | Stop reason: HOSPADM

## 2022-11-01 RX ORDER — ALBUTEROL SULFATE 90 UG/1
1-2 AEROSOL, METERED RESPIRATORY (INHALATION) EVERY 4 HOURS PRN
Status: DISCONTINUED | OUTPATIENT
Start: 2022-11-01 | End: 2022-11-05 | Stop reason: HOSPADM

## 2022-11-01 RX ORDER — LIDOCAINE 40 MG/G
CREAM TOPICAL
Status: DISCONTINUED | OUTPATIENT
Start: 2022-11-01 | End: 2022-11-01 | Stop reason: HOSPADM

## 2022-11-01 RX ORDER — BIMATOPROST 3 UG/ML
1 SOLUTION TOPICAL
COMMUNITY

## 2022-11-01 RX ORDER — SERTRALINE HYDROCHLORIDE 25 MG/1
50 TABLET, FILM COATED ORAL DAILY
COMMUNITY
End: 2023-04-12 | Stop reason: ALTCHOICE

## 2022-11-01 RX ORDER — HYDRALAZINE HYDROCHLORIDE 20 MG/ML
10 INJECTION INTRAMUSCULAR; INTRAVENOUS ONCE
Status: DISCONTINUED | OUTPATIENT
Start: 2022-11-01 | End: 2022-11-02

## 2022-11-01 RX ORDER — UBIDECARENONE 200 MG
200 CAPSULE ORAL DAILY
COMMUNITY
End: 2022-12-30

## 2022-11-01 RX ORDER — FLUMAZENIL 0.1 MG/ML
0.2 INJECTION, SOLUTION INTRAVENOUS
Status: DISCONTINUED | OUTPATIENT
Start: 2022-11-01 | End: 2022-11-01 | Stop reason: HOSPADM

## 2022-11-01 RX ORDER — HEPARIN SODIUM 200 [USP'U]/100ML
1 INJECTION, SOLUTION INTRAVENOUS CONTINUOUS PRN
Status: DISCONTINUED | OUTPATIENT
Start: 2022-11-01 | End: 2022-11-01 | Stop reason: HOSPADM

## 2022-11-01 RX ORDER — AZITHROMYCIN 250 MG/1
TABLET, FILM COATED ORAL DAILY
Status: ON HOLD | COMMUNITY
End: 2022-11-05

## 2022-11-01 RX ORDER — ONDANSETRON 2 MG/ML
4 INJECTION INTRAMUSCULAR; INTRAVENOUS EVERY 6 HOURS PRN
Status: DISCONTINUED | OUTPATIENT
Start: 2022-11-01 | End: 2022-11-05 | Stop reason: HOSPADM

## 2022-11-01 RX ADMIN — MIDAZOLAM HYDROCHLORIDE 0.5 MG: 1 INJECTION, SOLUTION INTRAMUSCULAR; INTRAVENOUS at 13:38

## 2022-11-01 RX ADMIN — IOPAMIDOL 125 ML: 755 INJECTION, SOLUTION INTRAVENOUS at 10:45

## 2022-11-01 RX ADMIN — SODIUM CHLORIDE: 9 INJECTION, SOLUTION INTRAVENOUS at 15:40

## 2022-11-01 RX ADMIN — FENTANYL CITRATE 25 MCG: 50 INJECTION, SOLUTION INTRAMUSCULAR; INTRAVENOUS at 13:38

## 2022-11-01 RX ADMIN — SODIUM CHLORIDE 100 ML: 900 INJECTION INTRAVENOUS at 10:45

## 2022-11-01 RX ADMIN — POLYETHYLENE GLYCOL 400 AND PROPYLENE GLYCOL 1 DROP: 4; 3 SOLUTION/ DROPS OPHTHALMIC at 20:37

## 2022-11-01 RX ADMIN — IOPAMIDOL 51 ML: 612 INJECTION, SOLUTION INTRAVENOUS at 14:08

## 2022-11-01 RX ADMIN — FENTANYL CITRATE 25 MCG: 50 INJECTION, SOLUTION INTRAMUSCULAR; INTRAVENOUS at 13:15

## 2022-11-01 RX ADMIN — FENTANYL CITRATE 50 MCG: 50 INJECTION, SOLUTION INTRAMUSCULAR; INTRAVENOUS at 12:42

## 2022-11-01 RX ADMIN — MIDAZOLAM HYDROCHLORIDE 1 MG: 1 INJECTION, SOLUTION INTRAMUSCULAR; INTRAVENOUS at 12:42

## 2022-11-01 RX ADMIN — MIDAZOLAM HYDROCHLORIDE 0.5 MG: 1 INJECTION, SOLUTION INTRAMUSCULAR; INTRAVENOUS at 13:15

## 2022-11-01 RX ADMIN — LIDOCAINE HYDROCHLORIDE 6 ML: 10 INJECTION, SOLUTION INFILTRATION; PERINEURAL at 12:44

## 2022-11-01 RX ADMIN — HEPARIN SODIUM 6 BAG: 200 INJECTION, SOLUTION INTRAVENOUS at 13:06

## 2022-11-01 RX ADMIN — FAMOTIDINE 20 MG: 10 INJECTION INTRAVENOUS at 20:37

## 2022-11-01 ASSESSMENT — ENCOUNTER SYMPTOMS
HEADACHES: 1
FEVER: 0
FACIAL ASYMMETRY: 0
WEAKNESS: 0
SPEECH DIFFICULTY: 1
SHORTNESS OF BREATH: 0
ABDOMINAL PAIN: 0

## 2022-11-01 ASSESSMENT — ACTIVITIES OF DAILY LIVING (ADL)
ADLS_ACUITY_SCORE: 43
ADLS_ACUITY_SCORE: 35
ADLS_ACUITY_SCORE: 47
ADLS_ACUITY_SCORE: 35
ADLS_ACUITY_SCORE: 43
ADLS_ACUITY_SCORE: 47
ADLS_ACUITY_SCORE: 35

## 2022-11-01 NOTE — CONSULTS
[facility]    Stroke Consult Note    Reason for Consult: Stroke Code     Chief Complaint: No chief complaint on file.      HPI  Neena Castellano is a 82 year old female with recent COVID diagnosis on Paxlovid, HTN and prior tobacco use. She presents to the ED for evaluation of speech changes. LKW was bedtime 10/31/22 at 1000. Then was seen by her daughter at 0920 this morning with significant speech difficulties. Presenting /81.    On exam, NIH=6 for mixed aphasia, mild dysarthria, mild RUE drift, inability to answer orientation questions and ability to follow only some commands. In discussion with daughter, reports that mRS is 0; patient is still managing her own finances, driving, managing medications, looking after all of own needs and does not require assistance with mobility.     Imaging Findings  CT head: subtle hypodensity in left insular cortex concerning for establishing infarct   CTA head/neck: left M2 occlusion   CT perfusion: small core infarct in left MCA territory with moderate surrounding penumbra, mismatch ratio of 7.9     Intravenous Thrombolysis  Not given due to:   - established infarct on imaging  - unclear or unfavorable risk-benefit profile for extended window thrombolysis beyond the conventional 4.5 hour time window    Endovascular Treatment  Endovascular treatment initiated for left M2 occlusion    Impression   1. Left MCA territory infarct with M2 occlusion     Recommendations  -patient to undergo mechanical thrombectomy for left M2 occlusion  - Neurochecks and Vital Signs per post thrombectomy protocol   - Blood pressure management per post thrombectomy protocol   - Statin: Liptor 40mg  - MRI Brain with and without contrast  - TTE (with Bubble Study if age 60 yrs or less)  - Telemetry, EKG  - Bedside Glucose Monitoring  - A1c, Lipid Panel, Troponin x 3  - PT/OT/SLP  - Stroke Education  - Euthermia, Euglycemia      Melly Cerrato, APRN, CNP  Vascular  "Neurology  To page me or covering stroke neurology team member, click here: AMCOM   Choose \"On Call\" tab at top, then search dropdown box for \"Neurology Adult\", select location, press Enter, then look for stroke/neuro ICU/telestroke.    ______________________________________________________    Clinically Significant Risk Factors Present on Admission                    Past Medical History   No past medical history on file.  Past Surgical History   Past Surgical History:   Procedure Laterality Date     BIOPSY BREAST Left     benign     BUNIONECTOMY Right 11/7/2014    Procedure: RIGHT 1ST METATARSAL PHALANGEAL JOINT FUSION ;  Surgeon: Washington Blue DPM;  Location: [facility];  Service:      CATARACT EXTRACTION, BILATERAL       COLONOSCOPY  2012     HC REPAIR OF HAMMERTOE,ONE Right 11/7/2014    Procedure: RIGHT 2ND HAMMERTOE CORRECTION;  Surgeon: Washington Blue DPM;  Location: [facility];  Service: Podiatry     HYSTERECTOMY  1993     OOPHORECTOMY  1993     Medications   Home Meds  Prior to Admission medications    Medication Sig Start Date End Date Taking? Authorizing Provider   albuterol (PROAIR HFA/PROVENTIL HFA/VENTOLIN HFA) 108 (90 Base) MCG/ACT inhaler Inhale 1-2 puffs into the lungs every 4 hours as needed 11/20/18   Reported, Patient   ATORVASTATIN CALCIUM PO  8/20/20   Reported, Patient   cetirizine (ZYRTEC) 10 MG tablet Take 10 mg by mouth daily 11/21/19   Reported, Patient   cholecalciferol (VITAMIN D3) 25 mcg (1000 units) capsule  8/20/20   Reported, Patient   citalopram (CELEXA) 20 MG tablet Take 20 mg by mouth daily 9/16/19   Reported, Patient   cycloSPORINE (RESTASIS) 0.05 % ophthalmic emulsion Apply 1 drop to eye 5/28/17   Reported, Patient   estradiol (ESTRACE) 0.1 MG/GM vaginal cream Place 2 g vaginally twice a week    Reported, Patient   ipratropium (ATROVENT) 0.03 % nasal spray Spray 2 sprays in nostril daily    Reported, Patient   methylcellulose (CITRUCEL) 500 MG TABS tablet Take 500 mg " by mouth daily    Reported, Patient   metoprolol succinate ER (TOPROL-XL) 50 MG 24 hr tablet Take 50 mg by mouth daily 19   Reported, Patient   multivitamin (CENTRUM SILVER) tablet Take 1 tablet by mouth daily    Reported, Patient   polyethylene glycol-propylene glycol (SYSTANE ULTRA) 0.4-0.3 % SOLN ophthalmic solution Place 1 drop into both eyes every hour as needed for dry eyes    Reported, Patient       Scheduled Meds      Infusion Meds      PRN Meds      Allergies   Allergies   Allergen Reactions     Codeine Nausea and Vomiting     Hydrocodone-Acetaminophen Nausea     Unknown on which vicodin        Levofloxacin Muscle Pain (Myalgia) and Other (See Comments)     Joint pain  Joint pain  Shoulder pain       Sulfa Drugs Itching     Aspirin Unknown     Levothyroxine      Other reaction(s): Arthralgia     Methotrexate      Other reaction(s): *Unknown - Pt Doesn't Remember  Patient and family do not recall this allergy       Tetanus-Diphtheria Toxoids      Other reaction(s): Edema  Horse serum tetanus.       Tetracyclines Unknown     Family History   Family History   Problem Relation Age of Onset     Ovarian Cancer Mother 83.00     Hereditary Breast and Ovarian Cancer Syndrome No family hx of      Breast Cancer No family hx of      Cancer No family hx of      Colon Cancer No family hx of      Endometrial Cancer No family hx of      Social History   Social History     Tobacco Use     Smoking status: Former     Types: Cigarettes     Quit date: 1989     Years since quittin.7     Smokeless tobacco: Never   Substance Use Topics     Alcohol use: Not Currently       Review of Systems   The 10 point Review of Systems is negative other than noted in the HPI or here.        PHYSICAL EXAMINATION  Temp:  [98.7  F (37.1  C)] 98.7  F (37.1  C)  Pulse:  [53] 53  Resp:  [22] 22  BP: (190)/(81) 190/81  SpO2:  [100 %] 100 %     General Exam  General:  patient lying in bed without any acute distress    HEENT:   normocephalic/atraumatic  Pulmonary:  no respiratory distress    Neuro Exam  Mental Status:  Alert and engaged in visit, able to state full name and  but not age, month or other orientation questions, follows some commands (ie closes and opens eyes, but unable to stick out tongue on command, when asked to raise arms will raise legs instead), significant expressive and moderate receptive aphasia, unable to repeat or name   Cranial Nerves:  visual fields intact, PERRL, EOMI with normal smooth pursuit, facial sensation intact and symmetric, facial movements symmetric, hearing not formally tested but intact to conversation, palate elevation symmetric and uvula midline, no dysarthria, shoulder shrug strong bilaterally, tongue protrusion midline  Motor:  normal muscle tone and bulk, no abnormal movements, able to move all limbs spontaneously, subtle RUE drift, strength otherwise intact in LUE and BLE  Sensory:  light touch sensation intact and symmetric throughout upper and lower extremities, no extinction on double simultaneous stimulation   Coordination:  normal finger-to-nose and heel-to-shin bilaterally without dysmetria  Station/Gait:  deferred    Dysphagia Screen  Per Nursing    Stroke Scales    NIHSS  1a. Level of Consciousness 0-->Alert, keenly responsive   1b. LOC Questions 2-->Answers neither question correctly   1c. LOC Commands 1-->Performs one task correctly   2.   Best Gaze 0-->Normal   3.   Visual 0-->No visual loss   4.   Facial Palsy 0-->Normal symmetrical movements   5a. Motor Arm, Left 0-->No drift, limb holds 90 (or 45) degrees for full 10 secs   5b. Motor Arm, Right 1-->Drift, limb holds 90 (or 45) degrees, but drifts down before full 10 secs, does not hit bed or other support   6a. Motor Leg, Left 0-->No drift, leg holds 30 degree position for full 5 secs   6b. Motor Leg, right 0-->No drift, leg holds 30 degree position for full 5 secs   7.   Limb Ataxia 0-->Absent   8.   Sensory 0-->Normal, no  sensory loss   9.   Best Language 1-->Mild-to-moderate aphasia, some obvious loss of fluency or facility of comprehension, without significant limitation on ideas expressed or form of expression. Reduction of speech and/or comprehension, however, makes conversation. . . (see row details)   10. Dysarthria 1-->Mild-to-moderate dysarthria, patient slurs at least some words and, at worst, can be understood with some difficulty   11. Extinction and Inattention  0-->No abnormality   Total 6 (11/01/22 1256)       Modified Roxy Score (Pre-morbid)  0 - No symptoms.    Imaging  I personally reviewed all imaging; relevant findings per HPI.     Lab Results Data   CBC  No results for input(s): WBC, RBC, HGB, HCT, PLT in the last 168 hours.  Basic Metabolic Panel    No results for input(s): NA, POTASSIUM, CHLORIDE, CO2, BUN, CR, GLC, JAMES in the last 168 hours.  Liver Panel  No results for input(s): PROTTOTAL, ALBUMIN, BILITOTAL, ALKPHOS, AST, ALT, BILIDIRECT in the last 168 hours.  INR  No lab results found.   Lipid Profile    Recent Labs   Lab Test 07/08/16  1431 07/06/15  1403   CHOL 211* 196   HDL 65 56    121   TRIG 99 95     A1C  No lab results found.  Troponin  No results for input(s): CTROPT, TROPONINIS, TROPONINI, GHTROP in the last 168 hours.       Stroke Code Data Data   Stroke Code Data  (for stroke code without tele)  Stroke code activated 11/01/22   1036   First stroke provider response 11/01/22   1040   Last known normal 10/31/22   1000   Time of discovery   (or onset of symptoms) 11/01/22   0920   Head CT read by Stroke Neuro Dr/Provider 11/01/22   1054   Was stroke code de-escalated? No

## 2022-11-01 NOTE — H&P
Admitted: 11/01/2022    PRIMARY CARE PHYSICIAN:  Bridget Mason.    CHIEF COMPLAINT:  Aphasia.    HISTORY OF PRESENT ILLNESS:  Neena Castellano is 82, fairly healthy with history of hyperlipidemia, depression, hypertension, who recently was diagnosed of having COVID, diagnosed last Wednesday.  She was on Paxlovid from Wednesday to Sunday.  Her last known well period was yesterday evening when she went to bed.  She lives with her daughter.  Around 9:20 this morning, the family noticed that she had some expressive aphasia.  She was brought in by EMS to [facility] Emergency Department for further assessment.    In the Emergency Department, the patient was seen by Dr. Polo Henry.  The patient's stroke score was between 4 and 5.  She was hypertensive at admission with blood pressure as high as 200/92.  Initial blood work revealed normal electrolytes, normal kidney function, CBC was normal.  She had a CT of the head without contrast, which showed subtle asymmetric hypoattenuation in the posterolateral insular cortex concerning for acute ischemic infarct.  No evidence of any intracranial hemorrhage, mass or herniation.  She had a CT of the head and neck, which showed occlusion of the dominant inferior M2 division of the left MCA.  No other high-grade stenosis or large vessel occlusion identified.  She had a CT of the perfusion, which showed findings consistent with small core infarct in the left middle cerebral territory centered in the left lateral temporal region with moderate surrounding presumed ischemic penumbra.    The patient was seen by Stroke Neurology and she underwent an attempted embolectomy.  The patient underwent a thrombectomy after two passes with a Solitaire stent retriever and impaired of suctioning aspiration clot burden was decreased, but there was still some residual distal thrombus noted.  The patient is being admitted to the ICU for post-thrombectomy for left MCA stroke.    PAST  MEDICAL HISTORY:  1.  Atrophic vaginitis.  2.  GERD.  3.  Hypertension.  4.  Mild intermittent asthma.  5.  Allergic rhinitis.  6.  Anxiety  7.  Osteoarthritis.  8.  History of eustachian dysfunction.  9.  Former smoker.    PAST SURGICAL HISTORY:  Breast biopsy that was noted to be benign, bunionectomy, bilateral cataracts, colonoscopy, hammertoe repair, hysterectomy, oophorectomy.    FAMILY HISTORY:  Mother with ovarian cancer.    SOCIAL HISTORY:  No tobacco or alcohol.  Lives with her daughter.  She is Full Code.    ALLERGIES:  CODEINE, NORCO, LEVOFLOXACIN, SULFA, ASPIRIN, LEVOTHYROXINE, METHOTREXATE, TETANUS-DIPHTHERIA TOXOID AND TETRACYCLINE.    CURRENT MEDICATION LIST:    1.  Albuterol.  2.  Atorvastatin.  3.  Cetirizine.  4.  Vitamin D.  5.  Celexa.  6.  Cyclosporine.  7.  Estradiol vaginal cream.  8.  Atrovent nasal spray.  9.  Methylcellulose tablets.  10.  Metoprolol.  11.  Multivitamin.  12.  Systane eyedrops.    REVIEW OF SYSTEMS:  Significant for speech difficulties, headache.  PHYSICAL EXAMINATION:   VITAL SIGNS:  Temperature 98.7, heart rate 50, respirations 11, blood pressure 170/104, sats are 90% on room air.  GENERAL:  The patient is an 80-year-old  female.  She is aphasic, but does follow some commands.  HEENT:  Pupils equal.  Extraocular muscles intact.  No facial asymmetry.  Tongue is midline.  NECK:  JVP is not elevated.  PULMONARY:  Lungs are clear.  CARDIOVASCULAR:  S1, S2.  Lewis, regular.  GASTROINTESTINAL:  Abdomen is soft, nontender.  MUSCULOSKELETAL:  No edema.  NEUROLOGIC:  She is aphasic with word finding difficulties.  Occasionally, she has trouble following commands.  She does move her lower extremities.   strength is symmetrical.  Sensation intact.  Coordination intact, but has significant word finding difficulties.  Dysarthric speech.  SKIN:  Warm, dry, well perfused.  PSYCHIATRIC:  Mood and affect pleasant and cooperative.    LABORATORY DATA:  As dictated in History of  Present Illness.    ASSESSMENT AND PLAN:  Jw Smith is 82 and presents with acute left M2 occlusion on CT imaging, who underwent endovascular treatment with 2 passes and reduction of overall thrombus burden, being admitted to the ICU for post-thrombectomy care.  1.  Acute left MCA M2 occlusion:  The patient has aphasia.  The patient underwent mechanical thrombectomy with reduction of thrombus burden.  The patient will be monitored in the ICU.  She will be made n.p.o. and pending speech evaluation.  Meanwhile, the patient will receive normal saline as well as blood pressure control parameters as per Stroke Neurology.  Given that she does have still some residual clot burden, her blood pressure will be allowed to run higher for permissive hypertension.  The patient will complete stroke workup with MRI and echocardiogram and A1c, lipids, troponin and other evaluation by PT and OT.  The patient will be started on Lipitor.  2.  Hyperlipidemia:  We will continue the patient on her Lipitor.  3.  History of intermittent asthma:  Her lungs are clear.  We will make albuterol available.  We will also have Atrovent nasal spray.  4.  Hypertension:  She is normally on metoprolol, but allow for permissive hypertension.  5.  Seasonal allergies: We will continue the patient on Zyrtec once her swallowing function is evaluated. The patient will be on her Restasis.  6.  Depression:  We will continue the patient on Celexa once her oral swallowing ability has been confirmed.  7.  DVT prophylaxis:  The patient received compression boots.    CODE STATUS:  Full.    Rad Lopez MD        D: 2022   T: 2022   MT: ETREMT    Name:     JW SMITH  MRN:      [id number]        Account:     [id number]   :      1940           Admitted:    2022       Document: [id number]    cc:  Bridget Lopez MD

## 2022-11-01 NOTE — PROGRESS NOTES
[facility]     Endovascular Surgical Neuroradiology Pre-Procedure Note      HPI:  Neena Castellano is a 82 year old female with history of HTN who presents with aphasia. Last known normal 2000 the evening prior, had headache upon awakening, did not attempt to speak to anyone until around 0900 today and felt very confused and unable to get her words out clearly.    Medical History:  Reviewed    Surgical History:  Reviewed    Family History:  Reviewed    Social History:  Reviewed    Allergies:  Allergies   Allergen Reactions     Codeine Nausea and Vomiting     Hydrocodone-Acetaminophen Nausea     Unknown on which vicodin        Levofloxacin Muscle Pain (Myalgia) and Other (See Comments)     Joint pain  Joint pain  Shoulder pain       Sulfa Drugs Itching     Aspirin Unknown     Levothyroxine      Other reaction(s): Arthralgia     Methotrexate      Other reaction(s): *Unknown - Pt Doesn't Remember  Patient and family do not recall this allergy       Tetanus-Diphtheria Toxoids      Other reaction(s): Edema  Horse serum tetanus.       Tetracyclines Unknown       Is there a contrast allergy?  No    Medications:  I have reviewed this patient's current medications  Medications Prior to Admission   Medication Sig Dispense Refill Last Dose     albuterol (PROAIR HFA/PROVENTIL HFA/VENTOLIN HFA) 108 (90 Base) MCG/ACT inhaler Inhale 1-2 puffs into the lungs every 4 hours as needed        ATORVASTATIN CALCIUM PO         cetirizine (ZYRTEC) 10 MG tablet Take 10 mg by mouth daily        cholecalciferol (VITAMIN D3) 25 mcg (1000 units) capsule         citalopram (CELEXA) 20 MG tablet Take 20 mg by mouth daily        cycloSPORINE (RESTASIS) 0.05 % ophthalmic emulsion Apply 1 drop to eye        estradiol (ESTRACE) 0.1 MG/GM vaginal cream Place 2 g vaginally twice a week        ipratropium (ATROVENT) 0.03 % nasal spray Spray 2 sprays in nostril daily        methylcellulose (CITRUCEL) 500 MG TABS tablet Take  500 mg by mouth daily        metoprolol succinate ER (TOPROL-XL) 50 MG 24 hr tablet Take 50 mg by mouth daily        multivitamin (CENTRUM SILVER) tablet Take 1 tablet by mouth daily        polyethylene glycol-propylene glycol (SYSTANE ULTRA) 0.4-0.3 % SOLN ophthalmic solution Place 1 drop into both eyes every hour as needed for dry eyes      .    ROS:  The 10 point Review of Systems is negative other than noted in the HPI or here.     PHYSICAL EXAMINATION  Vital Signs:  B/P: 187/82,  T: 98.7,  P: 64,  R: 15    Cardio:  RRR  Pulmonary:  no respiratory distress  Abdomen:  soft, non-tender, non-distended    Neurologic  Mental Status:  Awake, alert, attentive, oriented to self and month. Spontaneous language is fluent but nonsensical, single word answers are coherent but halting, impaired naming and repetition  Cranial Nerves:  visual fields intact, PERRL, EOMI with normal smooth pursuit, facial sensation intact and symmetric, facial movements symmetric, hearing not formally tested but intact to conversation, palate elevation symmetric and uvula midline, no dysarthria, shoulder shrug strong bilaterally, tongue protrusion midline  Motor:  RUE drift, LUE intact, BLE no drift  Sensory:  intact/symmetric to light touch and pin prick throughout upper and lower extremities  Coordination:  FNF and HS intact without dysmetria    Pre-procedure National Institutes of Health Stroke Scale:      Score    Level of consciousness: (0)   Alert, keenly responsive    LOC questions: (1)   Answers one question correctly    LOC commands: (2)   Performs neither task correctly    Best gaze: (0)   Normal    Visual: (0)   No visual loss    Facial palsy: (0)   Normal symmetrical movements    Motor arm (left): (0)   No drift    Motor arm (right): (1)   Drift    Motor leg (left): (0)   No drift    Motor leg (right): (0)   No drift    Limb ataxia: (0)   Absent    Sensory: (0)   Normal- no sensory loss    Best language: (2)   Severe aphasia     Dysarthria: (0)   Normal    Extinction and inattention: (0)   No abnormality        Total Score:  6       LABS  (most recent Cr, BUN, GFR, PLT, INR, PTT within the past 7 days):  Recent Labs   Lab 11/01/22  1041   CR 0.94   BUN 27   GFRESTIMATED 60*      INR 1.01   PTT 24        Platelet Function P2Y12 (PRU):  Not applicable      ASSESSMENT:    PLAN:        PRE-PROCEDURE SEDATION ASSESSMENT     Pre-Procedure Sedation Assessment done at 1200.    Expected Level:  Moderate Sedation    Indication:  Sedation is required to allow for neurointerventional procedure.    Consent obtained from patient after discussing the risks, benefits and alternatives.     PO Intake:  Not NPO but emergent condition outweighs risk.    ASA Class:  Class 2 - MILD SYSTEMIC DISEASE, NO ACUTE PROBLEMS, NO FUNCTIONAL LIMITATIONS.    Mallampati:  N/A    History and physical reviewed and no updates needed. I have reviewed the lab findings, diagnostic data, medications, and the plan for sedation. I have determined this patient to be an appropriate candidate for the planned sedation and procedure and have reassessed the patient IMMEDIATELY PRIOR to sedation and procedure.    Patient was discussed with the Attending, Dr. Renteria, who agrees with the plan.    Antonette Og MD   Pager: [phone]

## 2022-11-01 NOTE — PROCEDURES
[facility]     Endovascular Surgical Neuroradiology Post-Procedure Note    Pre-Procedure Diagnosis:  L MCA M2 occlusion ischemic stroke  Post-Procedure Diagnosis:  L MCA M2 occlusion ischemic stroke    Procedure(s):   Endovascular treatment of acute ischemic stroke    - TICI Score: 0    Findings:  L MCA inferior division occlusion, clot burden decreased after two passes with solitaire stent retriever and imperative suction aspiration but residual distal thrombus    Plan:  Post-thombectomy cares per Stroke Neuro and Hospitalist services    Primary Surgeon:  Dr. Leo Renteria  Secondary Surgeon:  Not applicable  Secondary Surgeon Review:  None  Fellow:  Earle  Additional Assistants:  none    Prior to the start of the procedure and with procedural staff participation, I verbally confirmed: the patient s identity using two indicators, relevant allergies, that the procedure was appropriate and matched the consent or emergent situation, and that the correct equipment/implants were available. Immediately prior to starting the procedure I conducted the Time Out with the procedural staff and re-confirmed the patient s name, procedure, and site/side. (The Joint Commission universal protocol was followed.)  Yes    PRU value: Not applicable    Anesthesia:  Conscious Sedation  Medications:  Fentanyl 100 mcg IV, Midazolam 2 mg IV, lidocaine 1% 6 mL  Puncture site:  Right Femoral Artery    Fluoroscopy time (minutes):  19.5  Radiation dose (mGy):  1859.01    Contrast amount (mL):  51     Estimated blood loss (mL):  51    Closure:  Device    Disposition:  Will be followed in hospital by the Neuro Critical Care/Stroke team.        Sedation Post-Procedure Summary    Sedatives: Fentanyl and Midazolam (Versed)    Vital signs and pulse oximetry were monitored and remained stable throughout the procedure, and sedation was maintained until the procedure was complete.  The patient was monitored by staff until  sedation discharge criteria were met.    Patient tolerance:  Patient tolerated the procedure well with no immediate complications.    Time of sedation in minutes:  66 minutes from beginning to end of physician one to one monitoring.    Antonette Og MD  Pager:  [phone]

## 2022-11-01 NOTE — PHARMACY-ADMISSION MEDICATION HISTORY
Pharmacy Medication History  Admission medication history interview status for the 11/1/2022  admission is complete. See EPIC admission navigator for prior to admission medications     Location of Interview: Phone  Medication history sources: Patient's family/friend (daughter), Surescripts and Care Everywhere    Significant changes made to the medication list:  -Updated dosing of atorvastatin to 10mg daily, this was put on hold 10/28 when started Paxlovid and she was recommended to hold for several days after Paxlovid completed.  -Added Paxlovid (10/28-11/1) and Zpak (10/27-10/31) - she completed Zpak.  -Removed citalopram, now on sertraline.  -Added steroid creams & ketoconazole which she uses as needed for rashes.    In the past week, patient estimated taking medication this percent of the time: greater than 90%      Medication reconciliation completed by provider prior to medication history? Yes    Time spent in this activity: 20 min    Prior to Admission medications    Medication Sig Last Dose Taking? Auth Provider Long Term End Date   albuterol (PROAIR HFA/PROVENTIL HFA/VENTOLIN HFA) 108 (90 Base) MCG/ACT inhaler Inhale 1-2 puffs into the lungs every 4 hours as needed prn Yes Reported, Patient Yes    azithromycin (ZITHROMAX) 250 MG tablet Take by mouth daily 500mg Day 1 then 250mg daily x 4 days (10/27/22-10/31/22) 10/31/2022 at completed Yes Unknown, Entered By History     bimatoprost (LATISSE) 0.03 % external opthalmic solution Apply 1 drop topically nightly as needed (apply to eye lashes)  Yes Unknown, Entered By History     calcium citrate-vitamin D (CITRACAL) 315-200 MG-UNIT TABS per tablet Take 2 tablets by mouth 2 times daily 10/31/2022 Yes Unknown, Entered By History     cetirizine (ZYRTEC) 10 MG tablet Take 10 mg by mouth daily as needed  Yes Reported, Patient     clobetasol (TEMOVATE) 0.05 % external ointment Apply topically 2 times daily as needed (rash)  Yes Unknown, Entered By History     coenzyme  Q-10 200 MG CAPS Take 200 mg by mouth daily  Yes Unknown, Entered By History     cycloSPORINE (RESTASIS) 0.05 % ophthalmic emulsion Apply 1 drop to eye 2 times daily as needed for dry eyes  Yes Reported, Patient     estradiol (ESTRACE) 0.1 MG/GM vaginal cream Place 2 g vaginally twice a week Once or twice weekly Past Week Yes Reported, Patient     hydrocortisone (CORTAID) 1 % external cream Apply topically 2 times daily as needed for rash  Yes Unknown, Entered By History     ipratropium (ATROVENT) 0.03 % nasal spray Spray 2 sprays in nostril daily as needed  Yes Reported, Patient     ketoconazole (NIZORAL) 2 % external cream Apply topically 2 times daily as needed for itching  Yes Unknown, Entered By History     metoprolol succinate ER (TOPROL-XL) 50 MG 24 hr tablet Take 50 mg by mouth daily 10/31/2022 Yes Reported, Patient Yes    multivitamin (CENTRUM SILVER) tablet Take 1 tablet by mouth daily 10/31/2022 Yes Reported, Patient     nirmatrelvir and ritonavir (PAXLOVID) therapy pack Take 3 tablets by mouth 2 times daily X 5 days (10/28/22-11/2/22) 10/31/2022 Yes Unknown, Entered By History     polyethylene glycol-propylene glycol (SYSTANE ULTRA) 0.4-0.3 % SOLN ophthalmic solution Place 1 drop into both eyes every hour as needed for dry eyes  Yes Reported, Patient     sertraline (ZOLOFT) 25 MG tablet Take 25 mg by mouth daily 10/31/2022 Yes Unknown, Entered By History Yes    atorvastatin (LIPITOR) 10 MG tablet Take 10 mg by mouth daily  Patient not taking: Reported on 11/1/2022 Not Taking at Has had in past week, put on hold when Paxlovid started & instructed to hold several days after complete Paxlovid  Unknown, Entered By History Yes        The information provided in this note is only as accurate as the sources available at the time of update(s)

## 2022-11-01 NOTE — ED TRIAGE NOTES
"Pt presents to the ED via EMS for evaluation of aphasia.   EMS report as follows: COVID positive 10/28, pt went to bed at 2000 last evening, at 0800 pt woke up with a headache, at 0900 pt had \"word salad\" per daughter, pt lives with daughter, pre hospital blood sugar: 105. 20g left lower forearm.      Triage Assessment     Row Name 11/01/22 1105       Respiratory WDL    Rhythm/Pattern, Respiratory no shortness of breath reported;depth regular;pattern regular;unlabored              "

## 2022-11-01 NOTE — PLAN OF CARE
Writer called pt's daughter Sirisha and provided update. Pt's other daughter Sapna and son Fidel were also on the call via speaker. Sapna's phone number was updated. Fidel's phone was verified as correct.

## 2022-11-01 NOTE — ED PROVIDER NOTES
History   Chief Complaint:  Aphasia       The history is provided by the patient and the EMS personnel.      Neena Castellano is an 82 year old female with history of hypertension, GERD, and anxiety who presents via EMS from home with a mild headache since she woke up 2.5 hours ago at 0800 and sudden onset of expressive and receptive aphagia that was noted at 0920, approximately 1 hour ago. She went to bed last night at 2000 without symptoms; this was the last known well time. She did test positive for Covid 4 days ago; on paxlovid. She currently lives at home with her daughter. She denies history of stroke.  No facial droop or unilateral weakness to the upper or lower extremities.     Review of Systems   Constitutional: Negative for fever.   Respiratory: Negative for shortness of breath.    Cardiovascular: Negative for chest pain.   Gastrointestinal: Negative for abdominal pain.   Neurological: Positive for speech difficulty and headaches. Negative for facial asymmetry and weakness.   All other systems reviewed and are negative.    Allergies:  Codeine  Hydrocodone-Acetaminophen  Levofloxacin  Sulfa Drugs  Aspirin  Levothyroxine  Methotrexate  Tetanus-Diphtheria Toxoids  Tetracyclines  Horse/Equine containing products    Medications:  Atorvastatin  Citalopram  Sertraline  Metoprolol     Past Medical History:     GERD  Hypertension  Anxiety     Past Surgical History:    Biopsy breast  Bunionectomy   Cataract extraction, bilateral  Colonoscopy   Repair of hammertoe  Hysterectomy  Oophorectomy  EGD  Vein stripping     Family History:    Ovarian cancer  Prostate cancer    Social History:  The patient presents via EMS.  The patient presents alone.  Lives at home with her daughter  PCP: [facility]     Physical Exam     Patient Vitals for the past 24 hrs:   BP Temp Temp src Pulse Resp SpO2   11/01/22 1310 (!) 170/80 -- -- 51 -- 100 %   11/01/22 1305 (!) 183/83 -- -- 56 -- 100 %   11/01/22 1300  (!) 183/86 -- -- 53 -- 100 %   11/01/22 1255 (!) 173/76 -- -- 56 -- 100 %   11/01/22 1250 (!) 182/77 -- -- 57 -- 95 %   11/01/22 1245 (!) 180/84 -- -- 59 18 --   11/01/22 1240 (!) 186/95 -- -- 62 22 --   11/01/22 1235 (!) 196/87 -- -- 65 20 --   11/01/22 1210 -- -- -- 61 11 99 %   11/01/22 1200 (!) 187/82 -- -- 64 15 100 %   11/01/22 1150 (!) 178/75 -- -- 92 -- --   11/01/22 1140 (!) 179/86 -- -- 53 -- --   11/01/22 1130 (!) 170/104 -- -- 50 11 99 %   11/01/22 1120 (!) 179/82 -- -- 53 17 100 %   11/01/22 1110 (!) 186/112 -- -- 62 18 98 %   11/01/22 1103 (!) 185/83 -- -- 59 -- --   11/01/22 1041 (!) 190/81 98.7  F (37.1  C) Temporal 53 22 100 %       Physical Exam    General: Alert and cooperative with exam. Patient in moderate distress. She has expressive and receptive aphasia with mild to moderate slurred speech.  Head:  Scalp is NC/AT  Eyes:  No scleral icterus, PERRL. EOMI  ENT:  The external nose and ears are normal. The oropharynx is normal and without erythema; mucus membranes are moist. Uvula midline  Neck:  Normal range of motion without rigidity.  CV:  Regular rate and rhythm  Resp:  Breath sounds are clear bilaterally    Non-labored, no retractions or accessory muscle use  GI:  Abdomen is soft, no distension, no tenderness. No peritoneal signs  MS:  No lower extremity edema   Skin:  Warm and dry, No rash or lesions noted.  Neuro: Oriented and alert.  No gross motor or sensory deficits. CN 2-12 intact.  Expressive and receptive aphasia with slurred speech.  See NIH stroke scale below      Emergency Department Course   ECG  ECG results from 11/01/22   EKG 12-lead, tracing only     Value    Systolic Blood Pressure     Diastolic Blood Pressure     Ventricular Rate 51    Atrial Rate 51    MT Interval 182    QRS Duration 84        QTc 425    P Axis 62    R AXIS 35    T Axis 63    Interpretation ECG      Sinus bradycardia with Premature atrial complexes in a pattern of bigeminy  Otherwise normal ECG  No  previous ECGs available         Imaging:  CT Head Perfusion w Contrast   Preliminary Result   IMPRESSION: Findings consistent with a small core infarct in the left   middle artery territory centered in the lateral left temporal region,   with at least moderate surrounding presumed ischemic penumbra. Per the   rapid perfusion maps, the volume of the core infarct (CBF less than   30%) = 7 mL, and the volume of the presumed ischemic penumbra = 48 mL,   with mismatch ratio 7.9.      Findings were discussed with Dr. Valerio by myself by phone at   approximately 11:13 AM on 11/1/2022.      CTA Head Neck w Contrast   Preliminary Result   IMPRESSION:      CTA HEAD:   1. Occlusion of the dominant inferior division M2 branch of the left   middle cerebral artery.   2. No other high-grade stenosis or large vessel occlusion identified.      CTA NECK:   1. Mild bilateral carotid bifurcation atherosclerosis without   significant stenosis.   2. Patent cervical vertebral arteries.      Findings discussed by myself by phone with Dr. Henry at 11:13 AM on   11/1/2022.       CT Head w/o Contrast   Preliminary Result   IMPRESSION:      1. Subtle asymmetric hypoattenuation in the posterior left insular   cortex, concerning for acute ischemia/infarct (ASPECT score = 9). No   evidence of acute intracranial hemorrhage, mass, or herniation.   2. Generalized brain parenchymal volume loss and white matter changes   likely due to chronic microvascular ischemic disease.         IR Carotid Cerebral Angiogram Bilateral    (Results Pending)     Report per radiology    Laboratory:  Labs Ordered and Resulted from Time of ED Arrival to Time of ED Departure   BASIC METABOLIC PANEL - Abnormal       Result Value    Sodium 136      Potassium 4.4      Chloride 100      Carbon Dioxide (CO2) 24      Anion Gap 12      Urea Nitrogen 27      Creatinine 0.94      Calcium 9.9      Glucose 106 (*)     GFR Estimate 60 (*)    INR - Normal    INR 1.01     PARTIAL  THROMBOPLASTIN TIME - Normal    aPTT 24     TROPONIN I - Normal    Troponin I High Sensitivity 8     CBC WITH PLATELETS AND DIFFERENTIAL    WBC Count 6.5      RBC Count 4.70      Hemoglobin 14.4      Hematocrit 44.7      MCV 95      MCH 30.6      MCHC 32.2      RDW 13.2      Platelet Count 280      % Neutrophils 76      % Lymphocytes 17      % Monocytes 7      % Eosinophils 0      % Basophils 0      % Immature Granulocytes 0      NRBCs per 100 WBC 0      Absolute Neutrophils 4.9      Absolute Lymphocytes 1.1      Absolute Monocytes 0.5      Absolute Eosinophils 0.0      Absolute Basophils 0.0      Absolute Immature Granulocytes 0.0      Absolute NRBCs 0.0     GLUCOSE MONITOR NURSING POCT        Emergency Department Course:    Reviewed:  I reviewed nursing notes, vitals, past medical history and Care Everywhere    Assessments:  1032 I obtained history and examined the patient as noted above.   1033 I called a tier 2 code stroke.  1054 I spoke with the patient's sister.    Consults:  1041 I spoke with stroke neurology.  1113 I spoke with radiology.  1145 I consulted with Dr. Lopez, hospitalist, regarding the patient's history and presentation here in the emergency department who accepted the patient for admission.      Disposition:  The patient was admitted to the hospital under the care of Dr. Lopez.     Impression & Plan     CMS Diagnoses: The patient has stroke symptoms:         ED Stroke specific documentation           NIHSS PDF     Patient last known well time: 2000 last night  ED Provider first to bedside at: 1032  CT Results received at: 1113    Thrombolytics:   Not given due to:   - unclear or unfavorable risk-benefit profile for extended window thrombolysis beyond the conventional 4.5 hour time window    If treating with thrombolytics: Ensure SBP<180 and DBP<105 prior to treatment with thrombolytics.  Administering thrombolytics after treatment with IV labetalol, hydralazine, or nicardipine is reasonable  once BP control is established.    Endovascular Retrieval:  Endovascular treatment initiated for M2 occlusion    National Institutes of Health Stroke Scale (Baseline)  Time Performed: 1037     Score    Level of consciousness: (0)   Alert, keenly responsive    LOC questions: (1)   Answers one question correctly    LOC commands: (1)   Performs one task correctly    Best gaze: (0)   Normal    Visual: (0)   No visual loss    Facial palsy: (0)   Normal symmetrical movements    Motor arm (left): (0)   No drift    Motor arm (right): (0)   No drift    Motor leg (left): (0)   No drift    Motor leg (right): (0)   No drift    Limb ataxia: (0)   Absent    Sensory: (0)   Normal- no sensory loss    Best language: (1)   Mild to moderate aphasia    Dysarthria: (1)   Mild to moderate dysarthria    Extinction and inattention: (0)   No abnormality        Total Score:  4        Stroke Mimics were considered (including migraine headache, seizure disorder, hypoglycemia (or hyperglycemia), head or spinal trauma, CNS infection, Toxin ingestion and shock state (e.g. sepsis) .          Medical Decision Making:    Neena Castellano is a 82 year old female who presents for evaluation of expressive/receptive aphasia and dysarthria.  This is consistent with likely cerebrovascular accident.  Imaging as noted above; confirms left MCA M2 occlusion.  Discussed with stroke neurology service as well as patient/family.  Patient is outside the window for thrombolytics however is a candidate for thrombectomy and will be taken to IR directly from the ED.  Will be admitted to ICU following procedure with hospitalist service.  Differential considered for symptoms included CVA, TIA, dissection, cerebral tumor, migraine, infection, metabolic derangement, demyelination, etc.  EKG shows no atrial fibrillation nor significant arrhythmia noted on telemetry monitoring.      Critical Care Time: was 35 minutes for this patient excluding procedures    Diagnosis:     ICD-10-CM    1. Cerebrovascular accident (CVA), unspecified mechanism (H)  I63.9           Scribe Disclosure:  I, Sherry Zamudio, am serving as a scribe at 10:35 AM on 11/1/2022 to document services personally performed by Blayne Henry DO based on my observations and the provider's statements to me.          Blayne Henry DO  11/01/22 1328

## 2022-11-01 NOTE — PLAN OF CARE
Shift Summary  Assumed cares from 1430 to 1900.    Neuro: Alert, inconsistently follows commands. Pt does display expressive and receptive aphasia. Pt has incomprehensible speech. See neuro flow-sheet for detailed neurological assessments.   Cardiac: SB upper 40's to 50's. Pt allowed permissive hypertension with pressure goals of SBP <180, DBP <105.  Pulmonary: LSC, on room air.   GI: NPO. Last BM PTA.   : Gayle in place and draining adequate output.   Integumentary: R groin IR access site checked at frequency set by orders.   Restraints: Not needed.   Activity: Bedrest.     Plan of care has been explained to patient: Yes, as well as to pt's family via phone.     Ceci Mosqueda RN

## 2022-11-01 NOTE — H&P
[facility]    History and Physical - Hospitalist Service       Date of Admission:  11/1/2022  Dictation #: [id number]  Brief Summary (see dictation for more details): 82 year old admitted with acute Left M2 occlusion with wake up stroke symptoms of aphasia underwent mechanical thrombectomy with clot reduction being admitted to ICU for post thrombectomy care and to complete stroke work up.    Clinically Significant Risk Factors Present on Admission                                Rad Lopez MD  Hospitalist Service  [facility]  Securely message with the Vocera Web Console (learn more here)  Text page via [facility] Paging/Directory

## 2022-11-01 NOTE — IR NOTE
Interventional Radiology Intra-procedural Nursing Note    Patient Name: Neena Castellano  Medical Record Number: [id number]  Today's Date: November 1, 2022    Procedure: Cerebral angiogram, mechanical thrombectomy, femoral arteriotomy, IV moderate sedation and use of closure device  Start time: 1243  End time: 1349  Report provided to: MALCOM Ornelas  Patient depart time and location:  1420 to ICU Room 371        Note: Patient entered Interventional Radiology Suite number three via cart.  Pre-procedural neurological assessment completed along with peripheral pulse assessment. Please refer to documentation in Epic flowsheet.  Patient assisted onto procedural table in supine position. Prepped and draped. Dr. Og and Dr. Renteria in room. Time out and procedure started. Vital signs stable. Telemetry reading sinus bradycardia.    1243 first needle stick time.  A 8 Fr arterial sheath was placed in the right femoral artery.    1256 Clot ID time   Penumbra Suction times:  First pass [id number]  Second pass [id number]      Unable to assess neurological status during procedure due to sedation.    Procedure well tolerated by patient without complications. Procedure end with debrief by Dr. Og and Dr. Renteria.  Unable to appropriately complete post-procedural neurological assessment due to sedation. Please refer to documentation in Epic flowsheet for peripheral pulse assessment.    Sheath removed at 1343. 8 Fr angioseal deployed at 1343.  Quick clot and tegaderm dressing applied to right groin access site, dressing is c/d/i.      Administered medication totals:  Lidocaine 1% 6 mL Intradermal  Versed 2 mg IVP  Fentanyl 100 mcg IVP    Last dose of sedation administered at 1338.    Post-procedure Education  The following information has been reviewed with the patient:    1. Maintain bedrest with right leg straight until 1543  .  2. Notify nurse immediately if having any bleeding from site, any changes in sensation, or changes in  strength.  3. Notify nurse if you have to go to bathroom, so staff will assist you.   4. Nurses will be doing frequent checks afterwards, including your foot or arm pulses, vitals, groin site and neuro exam.   5. Press your call light if you have any questions.    Learner's Response to Angiogram Education: patient not ready due to sedation

## 2022-11-01 NOTE — ED NOTES
Bed: ST03  Expected date:   Expected time:   Means of arrival:   Comments:  [facility] - 514 - stroke alert COVID positive eta 1037

## 2022-11-02 ENCOUNTER — APPOINTMENT (OUTPATIENT)
Dept: CARDIOLOGY | Facility: CLINIC | Age: 82
DRG: 023 | End: 2022-11-02
Attending: INTERNAL MEDICINE
Payer: COMMERCIAL

## 2022-11-02 ENCOUNTER — APPOINTMENT (OUTPATIENT)
Dept: SPEECH THERAPY | Facility: CLINIC | Age: 82
DRG: 023 | End: 2022-11-02
Attending: INTERNAL MEDICINE
Payer: COMMERCIAL

## 2022-11-02 ENCOUNTER — APPOINTMENT (OUTPATIENT)
Dept: OCCUPATIONAL THERAPY | Facility: CLINIC | Age: 82
DRG: 023 | End: 2022-11-02
Attending: INTERNAL MEDICINE
Payer: COMMERCIAL

## 2022-11-02 LAB
ANION GAP SERPL CALCULATED.3IONS-SCNC: 5 MMOL/L (ref 3–14)
APTT PPP: 26 SECONDS (ref 22–38)
BUN SERPL-MCNC: 21 MG/DL (ref 7–30)
CALCIUM SERPL-MCNC: 8.6 MG/DL (ref 8.5–10.1)
CHLORIDE BLD-SCNC: 106 MMOL/L (ref 94–109)
CO2 SERPL-SCNC: 27 MMOL/L (ref 20–32)
CREAT SERPL-MCNC: 0.72 MG/DL (ref 0.52–1.04)
CRP SERPL-MCNC: 6.1 MG/L (ref 0–8)
ERYTHROCYTE [DISTWIDTH] IN BLOOD BY AUTOMATED COUNT: 13.5 % (ref 10–15)
FERRITIN SERPL-MCNC: 171 NG/ML (ref 8–252)
GFR SERPL CREATININE-BSD FRML MDRD: 83 ML/MIN/1.73M2
GLUCOSE BLD-MCNC: 87 MG/DL (ref 70–99)
GLUCOSE BLDC GLUCOMTR-MCNC: 110 MG/DL (ref 70–99)
GLUCOSE BLDC GLUCOMTR-MCNC: 86 MG/DL (ref 70–99)
HCT VFR BLD AUTO: 34.1 % (ref 35–47)
HGB BLD-MCNC: 11.7 G/DL (ref 11.7–15.7)
INR PPP: 1.12 (ref 0.85–1.15)
LVEF ECHO: NORMAL
MCH RBC QN AUTO: 31.7 PG (ref 26.5–33)
MCHC RBC AUTO-ENTMCNC: 34.3 G/DL (ref 31.5–36.5)
MCV RBC AUTO: 92 FL (ref 78–100)
PLATELET # BLD AUTO: 207 10E3/UL (ref 150–450)
POTASSIUM BLD-SCNC: 4 MMOL/L (ref 3.4–5.3)
RBC # BLD AUTO: 3.69 10E6/UL (ref 3.8–5.2)
SODIUM SERPL-SCNC: 138 MMOL/L (ref 133–144)
WBC # BLD AUTO: 7.1 10E3/UL (ref 4–11)

## 2022-11-02 PROCEDURE — 80048 BASIC METABOLIC PNL TOTAL CA: CPT | Performed by: INTERNAL MEDICINE

## 2022-11-02 PROCEDURE — 93306 TTE W/DOPPLER COMPLETE: CPT | Mod: 26 | Performed by: INTERNAL MEDICINE

## 2022-11-02 PROCEDURE — 86140 C-REACTIVE PROTEIN: CPT | Performed by: HOSPITALIST

## 2022-11-02 PROCEDURE — 85027 COMPLETE CBC AUTOMATED: CPT | Performed by: INTERNAL MEDICINE

## 2022-11-02 PROCEDURE — 97166 OT EVAL MOD COMPLEX 45 MIN: CPT | Mod: GO | Performed by: OCCUPATIONAL THERAPIST

## 2022-11-02 PROCEDURE — 92610 EVALUATE SWALLOWING FUNCTION: CPT | Mod: GN | Performed by: SPEECH-LANGUAGE PATHOLOGIST

## 2022-11-02 PROCEDURE — 36415 COLL VENOUS BLD VENIPUNCTURE: CPT | Performed by: INTERNAL MEDICINE

## 2022-11-02 PROCEDURE — 99291 CRITICAL CARE FIRST HOUR: CPT | Performed by: PSYCHIATRY & NEUROLOGY

## 2022-11-02 PROCEDURE — 85730 THROMBOPLASTIN TIME PARTIAL: CPT | Performed by: INTERNAL MEDICINE

## 2022-11-02 PROCEDURE — 250N000013 HC RX MED GY IP 250 OP 250 PS 637: Performed by: NURSE PRACTITIONER

## 2022-11-02 PROCEDURE — 200N000001 HC R&B ICU

## 2022-11-02 PROCEDURE — 250N000011 HC RX IP 250 OP 636: Performed by: HOSPITALIST

## 2022-11-02 PROCEDURE — 250N000013 HC RX MED GY IP 250 OP 250 PS 637: Performed by: INTERNAL MEDICINE

## 2022-11-02 PROCEDURE — 99233 SBSQ HOSP IP/OBS HIGH 50: CPT | Performed by: HOSPITALIST

## 2022-11-02 PROCEDURE — 250N000011 HC RX IP 250 OP 636: Performed by: INTERNAL MEDICINE

## 2022-11-02 PROCEDURE — 92526 ORAL FUNCTION THERAPY: CPT | Mod: GN | Performed by: SPEECH-LANGUAGE PATHOLOGIST

## 2022-11-02 PROCEDURE — 250N000013 HC RX MED GY IP 250 OP 250 PS 637: Performed by: HOSPITALIST

## 2022-11-02 PROCEDURE — 258N000003 HC RX IP 258 OP 636: Performed by: INTERNAL MEDICINE

## 2022-11-02 PROCEDURE — 82728 ASSAY OF FERRITIN: CPT | Performed by: HOSPITALIST

## 2022-11-02 PROCEDURE — 85610 PROTHROMBIN TIME: CPT | Performed by: INTERNAL MEDICINE

## 2022-11-02 PROCEDURE — 93306 TTE W/DOPPLER COMPLETE: CPT

## 2022-11-02 RX ORDER — CLOPIDOGREL BISULFATE 75 MG/1
75 TABLET ORAL DAILY
Status: DISCONTINUED | OUTPATIENT
Start: 2022-11-03 | End: 2022-11-05 | Stop reason: HOSPADM

## 2022-11-02 RX ORDER — CLOPIDOGREL BISULFATE 75 MG/1
300 TABLET ORAL ONCE
Status: COMPLETED | OUTPATIENT
Start: 2022-11-02 | End: 2022-11-02

## 2022-11-02 RX ORDER — METOPROLOL SUCCINATE 50 MG/1
50 TABLET, EXTENDED RELEASE ORAL DAILY
Status: DISCONTINUED | OUTPATIENT
Start: 2022-11-02 | End: 2022-11-02

## 2022-11-02 RX ORDER — LANOLIN ALCOHOL/MO/W.PET/CERES
3 CREAM (GRAM) TOPICAL
Status: DISCONTINUED | OUTPATIENT
Start: 2022-11-02 | End: 2022-11-05 | Stop reason: HOSPADM

## 2022-11-02 RX ORDER — METOPROLOL TARTRATE 25 MG/1
25 TABLET, FILM COATED ORAL 2 TIMES DAILY
Status: DISCONTINUED | OUTPATIENT
Start: 2022-11-02 | End: 2022-11-05 | Stop reason: HOSPADM

## 2022-11-02 RX ORDER — ENOXAPARIN SODIUM 100 MG/ML
40 INJECTION SUBCUTANEOUS EVERY 24 HOURS
Status: DISCONTINUED | OUTPATIENT
Start: 2022-11-02 | End: 2022-11-05 | Stop reason: HOSPADM

## 2022-11-02 RX ORDER — SERTRALINE HYDROCHLORIDE 25 MG/1
25 TABLET, FILM COATED ORAL DAILY
Status: DISCONTINUED | OUTPATIENT
Start: 2022-11-02 | End: 2022-11-05 | Stop reason: HOSPADM

## 2022-11-02 RX ORDER — ATORVASTATIN CALCIUM 20 MG/1
20 TABLET, FILM COATED ORAL EVERY EVENING
Status: DISCONTINUED | OUTPATIENT
Start: 2022-11-02 | End: 2022-11-05 | Stop reason: HOSPADM

## 2022-11-02 RX ADMIN — ATORVASTATIN CALCIUM 20 MG: 10 TABLET, FILM COATED ORAL at 20:23

## 2022-11-02 RX ADMIN — POLYETHYLENE GLYCOL 400 AND PROPYLENE GLYCOL 1 DROP: 4; 3 SOLUTION/ DROPS OPHTHALMIC at 08:52

## 2022-11-02 RX ADMIN — MELATONIN TAB 3 MG 3 MG: 3 TAB at 21:18

## 2022-11-02 RX ADMIN — FAMOTIDINE 20 MG: 10 INJECTION INTRAVENOUS at 08:51

## 2022-11-02 RX ADMIN — METOPROLOL TARTRATE 25 MG: 25 TABLET, FILM COATED ORAL at 20:23

## 2022-11-02 RX ADMIN — SODIUM CHLORIDE: 9 INJECTION, SOLUTION INTRAVENOUS at 04:21

## 2022-11-02 RX ADMIN — SODIUM CHLORIDE: 9 INJECTION, SOLUTION INTRAVENOUS at 17:41

## 2022-11-02 RX ADMIN — CLOPIDOGREL BISULFATE 300 MG: 75 TABLET ORAL at 15:38

## 2022-11-02 RX ADMIN — FAMOTIDINE 20 MG: 10 INJECTION INTRAVENOUS at 20:21

## 2022-11-02 RX ADMIN — SERTRALINE HYDROCHLORIDE 25 MG: 25 TABLET ORAL at 17:41

## 2022-11-02 RX ADMIN — POLYETHYLENE GLYCOL 400 AND PROPYLENE GLYCOL 1 DROP: 4; 3 SOLUTION/ DROPS OPHTHALMIC at 20:27

## 2022-11-02 RX ADMIN — ENOXAPARIN SODIUM 40 MG: 40 INJECTION SUBCUTANEOUS at 17:41

## 2022-11-02 ASSESSMENT — ACTIVITIES OF DAILY LIVING (ADL)
ADLS_ACUITY_SCORE: 43
ADLS_ACUITY_SCORE: 45
ADLS_ACUITY_SCORE: 43
ADLS_ACUITY_SCORE: 45
ADLS_ACUITY_SCORE: 45

## 2022-11-02 NOTE — PROGRESS NOTES
[facility]    Medicine Progress Note - Hospitalist Service    Date of Admission:  11/1/2022    Assessment & Plan     Neena Castellano is 82 and presents with acute left M2 occlusion on CT imaging, who underwent endovascular treatment with 2 passes and reduction of overall thrombus burden, being admitted to the ICU for post-thrombectomy care.    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 with reduction of thrombus burden On 11/01/2022  - On exam she still has aphasia and not able to follow commands and has word salad and she is able to move her legs and arms   - I reviewed her images and MRI brain showed Subacute ischemia posterior left insular cortex and mid left temporal lobe with petechial hemorrhage and Acute ischemia posterior left frontal to left parietal lobes along with small foci within the anterior frontal lobes bilaterally, left posterior parietal lobe along with left thalamus and mild diffuse age related changes   -ECHO was done and shows ef of 60-65%, no regional wall motion changes and RV is   normal in structure, function and size and mild (1+) mitral regurgitation and no pericardial effusion.   -Blood pressure goal is <180 and okay to start metoprolol per neurology  -HBAIC is 5.9  -LDL is 79 with goal of 40-70 and started on lipitor 20 mg daily  -  She was Initiated plavix 300mg x1 loading dose followed by 75mg every day --pt allergic to ASA  -Continue with physical therapy, Occupational Therapy and she was seen by speech and diet as ordered  -She will need 30-day cardiac event monitor upon discharge if A. fib is not captured on telemetry  -she will need to follow as outpatient with neurology and PCP    Recent covid 19 infection  - she was on paxlovid at home and she is on room air and  And per daughter she tested positive at home on either 10/26 or 10/27l  -  we will continue to monitor and she does not qualify for steroids and neurology okay with lovenox and will order the same   - will check crp , ferittin and ldh     Hyperlipidemia   -We will continue the patient on her Lipitor.     History of intermittent asthma  - Her lungs are clear and continue with prn albuterol and Atrovent nasal spray.       Hypertension    -She is normally on metoprolol, but allow for permissive hypertension and will start it later in the day     Seasonal allergies  - We will continue the patient on Zyrtec and Restasis.    Depression   -We will continue the patient on Celexa            Diet: Combination Diet Pureed Diet (level 4); Mildly Thick (level 2); No Straws    DVT Prophylaxis: Pneumatic Compression Devices, lovenox   Gayle Catheter: PRESENT, indication: Strict 1-2 Hour I&O  Central Lines: None  Cardiac Monitoring: ACTIVE order. Indication: ICU  Code Status: Full Code      Disposition Plan     Expected Discharge Date: 11/03/2022                The patient's care was discussed with the Bedside Nurse, Patient and Patient's Family.  I also spoke with the stroke team. Spoke with daughter mi Marcano MD [place]  Hospitalist Service  [facility]  Securely message with the Vocera Web Console (learn more here)  Text page via [email or web address] Paging/Directory         Clinically Significant Risk Factors Present on Admission                            Time spent in care of patient is 35 minutes and more than 50% of the time was spent in coordination of care for ongoing acute stroke, active COVID-19 infection and I spoke with neurology team and also reviewed all her labs, images    ______________________________________________________________________    Interval History     I saw her today and she was eating the puree diet and saying words which are non comprehensible and has aphasia and I cant make out what she is trying to say. She followed some commands like lifting her arms  and legs only after she I showed aldair how to do it . Rest of ROS is limited     Data reviewed today: I reviewed all medications, new labs and imaging results over the last 24 hours. I personally reviewed     MRI brain     Subacute ischemia posterior left insular cortex and mid left temporal lobe with petechial hemorrhage.  Acute ischemia posterior left frontal to left parietal lobes along with small foci within the anterior frontal lobes bilaterally, left posterior parietal lobe along with left thalamus.     Mild diffuse age related changes      Physical Exam   Vital Signs: Temp: 97.9  F (36.6  C) Temp src: Oral BP: 136/70 Pulse: 68   Resp: 14 SpO2: 95 % O2 Device: None (Room air) Oxygen Delivery: 3 LPM  Weight: 113 lbs 12.12 oz        General: Patient appears comfortable  But has aphasia  HEENT: Head is atraumatic, normocephalic.  Pupils are equal, round and reactive to light.  No scleral icterus. Oral mucosa is moist   Neck: Neck is supple   Respiratory: Lungs are clear to auscultation bilaterally with no wheeze or crackles   Cardiovascular: Regular rate , S1 and S2 normal with no murmer or rubs or gallops  Abdomen:   soft , non tender , non distended and bowel sound present   Skin: No skin rashes .  Neurologic: limited exam given her aphasia   Musculoskeletal:  Seen moving upper and lower extremity upper and lower extremities bilaterally   Psychiatric:tries to be cooperative    Data   Recent Labs   Lab 11/02/22  0752 11/02/22  0457 11/01/22  2139 11/01/22  1449 11/01/22  1041   WBC  --  7.1  --   --  6.5   HGB  --  11.7  --   --  14.4   MCV  --  92  --   --  95   PLT  --  207  --   --  280   INR  --  1.12  --   --  1.01   NA  --  138  --   --  136   POTASSIUM  --  4.0  --   --  4.4   CHLORIDE  --  106  --   --  100   CO2  --  27  --   --  24   BUN  --  21  --   --  27   CR  --  0.72  --   --  0.94   ANIONGAP  --  5  --   --  12   JAMES  --  8.6  --   --  9.9   GLC 86 87 102*   < > 106*    < > = values in this  interval not displayed.     Recent Results (from the past 24 hour(s))   CT Head w/o Contrast    Narrative    CT SCAN OF THE HEAD WITHOUT CONTRAST November 1, 2022 10:54 AM     HISTORY: Code stroke. Aphasia.    TECHNIQUE: Axial images of the head and coronal reformations without  IV contrast material. Radiation dose for this scan was reduced using  automated exposure control, adjustment of the mA and/or kV according  to patient size, or iterative reconstruction technique.    COMPARISON: None.    FINDINGS: Subtle asymmetric hypoattenuation of the posterior aspect of  the left insular cortex and the left superior temporal gyrus,  concerning for acute ischemia/infarct. No other definite CT findings  of acute infarct. No acute intracranial hemorrhage or mass effect. The  ventricles are normal in size, shape and configuration. Mild to  moderate diffuse parenchymal volume loss. Mild to moderate scattered  cerebral white matter hypodensities which are nonspecific, but likely  related to chronic microvascular ischemic disease.     Bilateral lens implants. The bony calvarium and bones of the skull  base appear intact. Bilateral temporomandibular joint degenerative  changes, left greater than right. The paranasal sinuses and mastoids  are relatively clear.      Impression    IMPRESSION:     1. Subtle asymmetric hypoattenuation in the posterior left insular  cortex and the left superior temporal gyrus, concerning for acute  ischemia/infarct (ASPECT score = 8). No evidence of acute intracranial  hemorrhage, mass, or herniation.  2. Generalized brain parenchymal volume loss and white matter changes  likely due to chronic microvascular ischemic disease.      JORY STACK MD         SYSTEM ID:  [id number]   CTA Head Neck w Contrast    Narrative    CT ANGIOGRAM OF THE HEAD AND NECK WITH CONTRAST November 1, 2022 10:55  AM     HISTORY: Code stroke.     TECHNIQUE: CT angiography with an injection of 75mL ISOVUE-370 IV with  scans  through the head and neck. Images were transferred to a separate  3-D workstation where multiplanar reformations and 3-D images were  created. Estimates of carotid stenoses are made relative to the distal  internal carotid artery diameters except as noted. Radiation dose for  this scan was reduced using automated exposure control, adjustment of  the mA and/or kV according to patient size, or iterative  reconstruction technique.      COMPARISON: CT head of same day.    CT ANGIOGRAM HEAD FINDINGS: The intracranial internal carotid arteries  are widely patent. There is occlusion of the dominant inferior  division M2 branch of the left middle cerebral artery (series 9 image  393). Asymmetrically diminutive caliber of the distal inferior  division left MCA branches. The bilateral anterior cerebral arteries  are patent. The major proximal branches of the right middle cerebral  arteries are patent. The bilateral vertebral arteries, basilar artery,  and the proximal aspects of the posterior cerebral arteries appear  patent. No intracranial aneurysm or high flow vascular malformation is  identified.    CT ANGIOGRAM NECK FINDINGS: The origin of the brachycephalic artery is  not included in the field-of-view. The origins of the left common  carotid artery and left subclavian artery are patent. Minimal  atherosclerosis in the visualized aortic arch.    Right carotid artery: The right common and internal carotid arteries  are patent. Mild atherosclerotic disease at the carotid bifurcation  and proximal internal carotid artery without significant stenosis by  NASCET criteria.     Left carotid artery: The left common and internal carotid arteries are  patent. Mild atherosclerotic disease at the carotid bifurcation and  proximal internal carotid artery without significant stenosis by  NASCET criteria. Tortuous internal carotid artery.    Vertebral arteries: Vertebral arteries are patent without evidence of  dissection. No  significant stenosis.     Other findings: There appear to be mild emphysematous changes in the  visualized upper lung zones bilaterally. ACDF changes at C6-C7.  Multilevel posterior decompression changes in the mid to lower  cervical region with posterior fusion instrumentation spanning C5-T1.  Probable chronic mild concavity of the T3 superior endplate.  Multilevel degenerative changes in the cervical and thoracic spine.      Impression    IMPRESSION:    CTA HEAD:  1. Occlusion of the dominant inferior division M2 branch of the left  middle cerebral artery.  2. No other high-grade stenosis or large vessel occlusion identified.    CTA NECK:  1. Mild bilateral carotid bifurcation atherosclerosis without  significant stenosis.  2. Patent cervical vertebral arteries.    Findings discussed by myself by phone with Dr. Henry at 11:13 AM on  11/1/2022.     JORY STACK MD         SYSTEM ID:  [id number]   CT Head Perfusion w Contrast    Narrative    CT BRAIN PERFUSION 11/1/2022 11:18 AM    HISTORY: Code stroke, left MCA occlusion.    TECHNIQUE: Time sequential axial CT images of the head were acquired  during the administration of 50mL Isovue-370 IV. Color perfusion maps  of the brain were created from this time sequential axial source data.      Radiation dose for this scan was reduced using automated exposure  control, adjustment of the mA and/or kV according to patient size, or  iterative reconstruction technique.    COMPARISON: CTA head and neck of same day.      Impression    IMPRESSION: Findings consistent with a small core infarct in the left  middle artery territory centered in the lateral left temporal region,  with at least moderate surrounding presumed ischemic penumbra. Per the  RAPID perfusion maps, the volume of the core infarct (CBF less than  30%) = 7 mL, and the volume of the presumed ischemic penumbra = 48 mL,  with mismatch ratio 7.9.    Findings were discussed with Dr. Henry by myself by phone  at  approximately 11:13 AM on 11/1/2022.    JORY STACK MD         SYSTEM ID:  [id number]   CT Head w/o Contrast    Narrative    EXAM: CT HEAD W/O CONTRAST  LOCATION: [facility]  DATE/TIME: 11/1/2022 6:13 PM    INDICATION: **dual energy** left M2 occlusion infarct s p mechanical thrombectomy  COMPARISON: CT angiogram 11/01/2022  TECHNIQUE: Routine CT Head without IV contrast. Multiplanar reformats. Dose reduction techniques were used.    FINDINGS:  INTRACRANIAL CONTENTS: No intracranial hemorrhage, extraaxial collection, or mass effect. Evolving acute infarct left insular region. Mild iodine staining the infarcted region. Tiny focus of air in the region likely postprocedure. Mild presumed chronic   small vessel ischemic changes. Mild generalized volume loss. No hydrocephalus.     VISUALIZED ORBITS/SINUSES/MASTOIDS: No intraorbital abnormality. No paranasal sinus mucosal disease. No middle ear or mastoid effusion.    BONES/SOFT TISSUES: No acute abnormality.      Impression    IMPRESSION:  1.  Mild iodine staining in the infarcted left insular region.  2.  No intracranial hemorrhage.   MR Brain w/o Contrast    Narrative    EXAM: MR BRAIN W/O CONTRAST  LOCATION: [facility]  DATE/TIME: 11/1/2022 11:49 PM    INDICATION: Follow-up ischemic stroke.  COMPARISON: 11/01/2022.  TECHNIQUE: MRI the brain without and with without contrast.    FINDINGS: On the diffusion-weighted images there are multiple foci of ischemia involving the posterior left insular cortex, left temporal lobe to the posterior left frontal to left parietal lobes. There are also a few tiny foci of ischemia involving the   anterior frontal lobes bilaterally, anterior thalamus and the posterior left temporal to parietal lobes.     The ischemia involving the posterior left insular cortex to the left temporal lobe are visualized on the FLAIR and T2-weighted images but the smaller foci of ischemia involving  the posterior left frontal to left parietal lobes and the tiny foci in other   vascular distribution are not discretely visualized. This is suggestive of an acute on subacute ischemia. On the susceptibility weighted images there is petechial hemorrhage in the left insular cortex region region. There are appropriate flow voids   within the cavernous portions of the internal carotid arteries and the basilar artery. On the FLAIR and T2-weighted images there are scattered foci of high signal within the periventricular and subcortical white matter consistent with mild diffuse small   vessel ischemic disease. The ventricular system, basal cisterns and the cortical sulci are consistent with mild diffuse volume loss.    There is no evidence of cerebellar tonsillar ectopia. The corpus callosum and the sella region have appropriate configuration and signal intensity for the patient's age. The orbit regions are unremarkable. There is no significant paranasal sinus disease.   The mastoid air cells and the middle ear regions are clear.      Impression    IMPRESSION:  1. Subacute ischemia posterior left insular cortex and mid left temporal lobe with petechial hemorrhage.  2. Acute ischemia posterior left frontal to left parietal lobes along with small foci within the anterior frontal lobes bilaterally, left posterior parietal lobe along with left thalamus.  3. Mild diffuse age related changes.    These findings were communicated by phone to Dr. De Oliveira at 12:13 AM on 11/02/2022.     Medications     - MEDICATION INSTRUCTIONS -       sodium chloride 75 mL/hr at 11/02/22 0421       atorvastatin  20 mg Oral or Feeding Tube QPM     famotidine  20 mg Intravenous Q12H     hydrALAZINE  10 mg Intravenous Once     polyethylene glycol-propylene glycol PF  1 drop Both Eyes BID     sodium chloride (PF)  3 mL Intracatheter Q8H

## 2022-11-02 NOTE — PROGRESS NOTES
"Nursing Progress Note, Shift [id number]    Neuro: alert, inconsistently follows commands, unable to assess orientation d/t speech but nods appropriately and appears to understand situation. Expressive/receptive aphasia, incoherent speech. Able to say simple phrases like \"yes, no, thank you\" appropriately. Moves all extremities spontaneously/equal. No neuro changes overnight.   Cardiac: SB rates 50-60s, normotensive  Resp: LSC, 3 L NC when sleeping (BALTAZAR) otherwise RA  GI: no BM overnight NPO for SLP  : Gayle in place with adequate output  Skin: intact, r groin site soft/CDI    Plan: MRI completed overnight- plan for echo at some point, therapies to see  "

## 2022-11-02 NOTE — PHARMACY-ADMISSION MEDICATION HISTORY
Pharmacy Consult to evaluate for medication related stroke core measures    Neena Castellano, 82 year old female admitted for Acute left MCA M2 occlusion on 11/1/2022.    Thrombolytic was not given because of unclear or unfavorable risk-benefit profile for extended window thrombolysis beyond the conventional 4.5 hour time window.    VTE Prophylaxis SCDs /PCDs placed on 11/1/22, as appropriate prior to end of hospital day 2.    Antithrombotic: Plan to begin Clopidogrel, as appropriate by end of hospital day 2. Continue antithrombotic therapy on discharge to meet quality measures, unless contraindicated.    Anticoagulation if history of A-fib/flutter: Patient does not have history of A-fib/flutter - anticoagulation not required for medication related stroke core measures.     LDL Cholesterol Calculated   Date Value Ref Range Status   11/01/2022 79 <=100 mg/dL Final       Patient currently receiving Lipitor (atorvastatin) continue statin on discharge to meet quality measures, unless contraindicated.    Recommendations: None at this time    Shannon Brandt, SydniD, BCPS

## 2022-11-02 NOTE — PROGRESS NOTES
11/02/22 1105   Appointment Info   Signing Clinician's Name / Credentials (OT) Astrid Host, OTR/L   Living Environment   People in Home child(mel), adult  (daughter)   Living Environment Comments Limited social hx from chart review, pt w/ expressive aphasia, unable to gather complete hx at time of eval.   Self-Care   Usual Activity Tolerance good   Current Activity Tolerance fair   General Information   Onset of Illness/Injury or Date of Surgery 11/01/22   Referring Physician Rad Lopez MD   Patient/Family Therapy Goal Statement (OT) Unable to state   Additional Occupational Profile Info/Pertinent History of Current Problem L MCA M2 occlusion ischemic stroke   Existing Precautions/Restrictions fall   Limitations/Impairments   (communication)   Cognitive Status Examination   Behavioral Issues overwhelmed easily   Affect/Mental Status (Cognitive) emotionally labile   Follows Commands does not follow one-step commands   Safety Deficit unable/difficult to assess   Memory Deficit unable/difficult to assess   Cognitive Status Comments Unable to assess d/t expressive aphasia. Attempted pen & paper to express needs. Unable to grasp pen to write w/ R hand.   Visual Perception   Visual Impairment/Limitations unable/difficult to assess   Sensory   Sensory Quick Adds unable/difficult to assess   Range of Motion Comprehensive   General Range of Motion upper extremity range of motion deficits identified   Strength Comprehensive (MMT)   General Manual Muscle Testing (MMT) Assessment upper extremity strength deficits identified   Coordination   Upper Extremity Coordination Right UE impaired   Functional Limitations Decreased speed;Fine motor ADL performance impaired;Object transport impaired   Bed Mobility   Bed Mobility supine-sit   Supine-Sit [place] (Bed Mobility) supervision   Activities of Daily Living   BADL Assessment/Intervention lower body dressing   Lower Body Dressing Assessment/Training   Position  (Lower Body Dressing) sitting up in bed   [place] Level (Lower Body Dressing) contact guard assist   Clinical Impression   Criteria for Skilled Therapeutic Interventions Met (OT) Yes, treatment indicated   OT Diagnosis Decreased ADL independence   OT Problem List-Impairments impacting ADL problems related to;activity tolerance impaired;balance;communication;coordination;mobility;strength;range of motion (ROM);cognition   Assessment of Occupational Performance 5 or more Performance Deficits   Identified Performance Deficits dressing, toileting, bathing, functional mobility, IADLs   Planned Therapy Interventions (OT) ADL retraining;IADL retraining;balance training;cognition;fine motor coordination training;neuromuscular re-education;ROM;strengthening;transfer training;progressive activity/exercise   Clinical Decision Making Complexity (OT) moderate complexity   Anticipated Equipment Needs Upon Discharge (OT)   (TBD)   Risk & Benefits of therapy have been explained patient   OT Total Evaluation Time   OT Eval, Moderate Complexity Minutes (63201) 20   OT Goals   Therapy Frequency (OT) Daily   OT Predicted Duration/Target Date for Goal Attainment 11/09/22   OT Goals Hygiene/Grooming;Upper Body Dressing;Lower Body Dressing;Transfers;Toilet Transfer/Toileting;OT Goal 1   OT: Hygiene/Grooming supervision/stand-by assist   OT: Upper Body Dressing Supervision/stand-by assist   OT: Lower Body Dressing Supervision/stand-by assist   OT: Transfer Supervision/stand-by assist;with assistive device   OT: Toilet Transfer/Toileting Supervision/stand-by assist;toilet transfer;cleaning and garment management   OT: Goal 1 Pt will complete 10-12 UE ex to increase ROM, strength, and coordination to increase independence w/ ADLs   OT Discharge Planning   OT Plan up to chair, bathroom as able. R UE coordination, command following   OT Discharge Recommendation (DC Rec) [facility] patient will benefit from intensive,  interdisciplinary therapy.  Anticipate will be able to tolerate 3 hours of therapy per day   OT Rationale for DC Rec Pt presents below baseline for ADL performance. Limited by expressive aphasia, decreased UE coordination, weakness. Will benefit from continued therapies at ARU to maximize independence, safety. Pending progress, may be able to discharge home w/ daughter and assist w/ all ADLs/IADLs, HHOT, PT, RN. Will continue to update recs   OT Brief overview of current status SBA for bed mobility, CGA for LB dress   Total Session Time   Total Session Time (sum of timed and untimed services) 20

## 2022-11-02 NOTE — PROGRESS NOTES
Daughter and son updated via phone and ipad. Ipad set up so family can visit with pt this am. Daughter would like to speak with MD. Writer asked [facility]CC MD's to call her. Also Dr. Og paged as daughter would like information on how procedure went. Cont to monitor.

## 2022-11-02 NOTE — CONSULTS
[facility]    Stroke Consult Note    Reason for Consult:  stroke    Chief Complaint: Aphasia     HPI  Neena Castellano is a 82 year old female with recent COVID diagnosis on Paxlovid, HTN and prior tobacco use. She presented to the ED 11/1/22 for evaluation of speech changes. LKW was bedtime (1000) the night prior, then was seen by her daughter at 0920 the next morning with significant speech difficulties. Presenting /81. CT with early ischemic changes in left MCA territory with M2 occlusion; CT perfusion with large mismatch volume and was sent for mechanical thrombectomy with TICI 0.     Today, Lynsey continues to have significant mixed aphasia, appearing somewhat worse than yesterday. She is able to produce sounds and some single words but no intelligible output. Also has receptive aphasia evidenced by inability to follow most commands. She does not have any clear weakness, sensory or visual deficits on exam.     Stroke Evaluation Summarized    MRI/Head CT Brain MRI 11/1: acute and subacute infarcts of left MCA territory and one foci of infarct in right frontal region; small petechial hemorrhage on GRE; mild to moderate chronic small vessel disease   Dual energy CT head 11/1: no evidence of hemorrhage   CT head 11/1: early ischemic changes of left MCA territory  CT perfusion: small core infarct in left MCA territory with moderate surrounding penumbra, mismatch ratio of 7.9    Intracranial Vasculature CTA: left M2 occlusion   Cervical Vasculature CTA: mild bilateral carotid atherosclerosis without significant stenosis      Echocardiogram LVEF 60-65%, no regional wall motion abnormalities   EKG/Telemetry Sinus bradycardia    Other Testing Not Applicable      LDL  11/1/2022: 79 mg/dL   A1C  11/1/2022: 5.9 %   Troponin 11/1/2022: 8 ng/L       Impression  1. Acute/subacute infarcts, primarily in left MCA distribution but with involvement of left frontal region as well, concerning for  "central source/embolic stroke of undetermined source (ESUS)    2. COVID +     Recommendations  - Neurochecks and Vital Signs per post-thrombectomy protocol  -patient may move to floor once >24 hours out from thrombectomy, once upon floor can transition to Q 4 hour neurochecks   - Initiate plavix 300mg x1 loading dose followed by 75mg every day (ordered--pt allergic to ASA)  -from stroke perspective, okay to initiate Lovenox for DVT prophylaxis   - BP goal <180  -LDL 79 (goal 40-70); initiate Lipitor 20mg with ongoing outpatient titration to achieve LDL goal   -Blood glucose monitoring, Hgb A1c 5.9%, (goal <7% for secondary stroke prevention), follow-up with PCP  - Telemetry, EKG  - Bedside Glucose Monitoring  - PT/OT/SLP  - Stroke Education  - Euthermia, Euglycemia  - will need 30 day cardiac event monitor upon discharge if afib not captured on tele (ordered)     Patient Follow-up    - final recommendation pending work-up    Thank you for this consult. We will continue to follow.     Melly VILLAR, CNP  Vascular Neurology  To page me or covering stroke neurology team member, click here: AMCOM   Choose \"On Call\" tab at top, then search dropdown box for \"Neurology Adult\", select location, press Enter, then look for stroke/neuro ICU/telestroke.  _____________________________________________________    Clinically Significant Risk Factors Present on Admission                      Past Medical History   No past medical history on file.  Past Surgical History   Past Surgical History:   Procedure Laterality Date     BIOPSY BREAST Left     benign     BUNIONECTOMY Right 11/7/2014    Procedure: RIGHT 1ST METATARSAL PHALANGEAL JOINT FUSION ;  Surgeon: Washington Blue DPM;  Location: [place];  Service:      CATARACT EXTRACTION, BILATERAL       COLONOSCOPY  2012     HC REPAIR OF HAMMERTOE,ONE Right 11/7/2014    Procedure: RIGHT 2ND HAMMERTOE CORRECTION;  Surgeon: Washington Blue DPM;  Location: [place] [facility];  " Service: Podiatry     HYSTERECTOMY  1993     OOPHORECTOMY  1993     Medications   Home Meds  Prior to Admission medications    Medication Sig Start Date End Date Taking? Authorizing Provider   albuterol (PROAIR HFA/PROVENTIL HFA/VENTOLIN HFA) 108 (90 Base) MCG/ACT inhaler Inhale 1-2 puffs into the lungs every 4 hours as needed 11/20/18  Yes Reported, Patient   azithromycin (ZITHROMAX) 250 MG tablet Take by mouth daily 500mg Day 1 then 250mg daily x 4 days (10/27/22-10/31/22)   Yes Unknown, Entered By History   bimatoprost (LATISSE) 0.03 % external opthalmic solution Apply 1 drop topically nightly as needed (apply to eye lashes)   Yes Unknown, Entered By History   calcium citrate-vitamin D (CITRACAL) 315-200 MG-UNIT TABS per tablet Take 2 tablets by mouth 2 times daily   Yes Unknown, Entered By History   cetirizine (ZYRTEC) 10 MG tablet Take 10 mg by mouth daily as needed 11/21/19  Yes Reported, Patient   clobetasol (TEMOVATE) 0.05 % external ointment Apply topically 2 times daily as needed (rash)   Yes Unknown, Entered By History   coenzyme Q-10 200 MG CAPS Take 200 mg by mouth daily   Yes Unknown, Entered By History   cycloSPORINE (RESTASIS) 0.05 % ophthalmic emulsion Apply 1 drop to eye 2 times daily as needed for dry eyes 5/28/17  Yes Reported, Patient   estradiol (ESTRACE) 0.1 MG/GM vaginal cream Place 2 g vaginally twice a week Once or twice weekly   Yes Reported, Patient   hydrocortisone (CORTAID) 1 % external cream Apply topically 2 times daily as needed for rash   Yes Unknown, Entered By History   ipratropium (ATROVENT) 0.03 % nasal spray Spray 2 sprays in nostril daily as needed   Yes Reported, Patient   ketoconazole (NIZORAL) 2 % external cream Apply topically 2 times daily as needed for itching   Yes Unknown, Entered By History   metoprolol succinate ER (TOPROL-XL) 50 MG 24 hr tablet Take 50 mg by mouth daily 9/16/19  Yes Reported, Patient   multivitamin (CENTRUM SILVER) tablet Take 1 tablet by mouth  daily   Yes Reported, Patient   nirmatrelvir and ritonavir (PAXLOVID) therapy pack Take 3 tablets by mouth 2 times daily X 5 days (10/28/22-11/2/22)   Yes Unknown, Entered By History   polyethylene glycol-propylene glycol (SYSTANE ULTRA) 0.4-0.3 % SOLN ophthalmic solution Place 1 drop into both eyes every hour as needed for dry eyes   Yes Reported, Patient   sertraline (ZOLOFT) 25 MG tablet Take 25 mg by mouth daily   Yes Unknown, Entered By History   atorvastatin (LIPITOR) 10 MG tablet Take 10 mg by mouth daily  Patient not taking: Reported on 11/1/2022    Unknown, Entered By History       Scheduled Meds    famotidine  20 mg Intravenous Q12H     hydrALAZINE  10 mg Intravenous Once     polyethylene glycol-propylene glycol PF  1 drop Both Eyes BID     sodium chloride (PF)  3 mL Intracatheter Q8H       Infusion Meds    - MEDICATION INSTRUCTIONS -       sodium chloride 75 mL/hr at 11/02/22 0421       PRN Meds  albuterol, lidocaine 4%, lidocaine (buffered or not buffered), - MEDICATION INSTRUCTIONS -, ondansetron **OR** ondansetron, polyethylene glycol-propylene glycol, sodium chloride (PF)    Allergies   Allergies   Allergen Reactions     Codeine Nausea and Vomiting     Hydrocodone-Acetaminophen Nausea     Unknown on which vicodin        Levofloxacin Muscle Pain (Myalgia) and Other (See Comments)     Joint pain  Joint pain  Shoulder pain       Sulfa Drugs Itching     Aspirin Unknown     Levothyroxine      Other reaction(s): Arthralgia     Methotrexate      Other reaction(s): *Unknown - Pt Doesn't Remember  Patient and family do not recall this allergy       Tetanus-Diphtheria Toxoids      Other reaction(s): Edema  Horse serum tetanus.       Tetracyclines Unknown     Family History   Family History   Problem Relation Age of Onset     Ovarian Cancer Mother 83.00     Hereditary Breast and Ovarian Cancer Syndrome No family hx of      Breast Cancer No family hx of      Cancer No family hx of      Colon Cancer No family hx  of      Endometrial Cancer No family hx of      Social History   Social History     Tobacco Use     Smoking status: Former     Types: Cigarettes     Quit date: 1989     Years since quittin.7     Smokeless tobacco: Never   Substance Use Topics     Alcohol use: Not Currently       Review of Systems   The 10 point Review of Systems is negative other than noted in the HPI or here.        PHYSICAL EXAMINATION   Temp:  [97.3  F (36.3  C)-98.7  F (37.1  C)] 98.1  F (36.7  C)  Pulse:  [] 64  Resp:  [7-24] 12  BP: (111-200)/() 130/65  SpO2:  [89 %-100 %] 94 %    General Exam  General:  patient lying in bed without any acute distress    HEENT:  normocephalic/atraumatic  Pulmonary:  no respiratory distress    Neuro Exam  Mental Status:  alert, appears to respond to name but otherwise unable to state name or answer orientation questions, able to follow 1/3 commands (can close eyes but not make fist or thumbs up), rare verbal output but without any intelligible output; aphasia is mixed with both receptive and expressive components    Cranial Nerves:  visual fields intact, PERRL, EOMI with normal smooth pursuit, facial sensation intact and symmetric, facial movements symmetric, hearing not formally tested but intact to conversation, palate elevation symmetric and uvula midline, mild dysarthria, shoulder shrug strong bilaterally, tongue protrusion midline  Motor:  normal muscle tone and bulk, no abnormal movements, able to move all limbs spontaneously, strength 5/5 throughout upper and lower extremities, no pronator drift  Sensory:  light touch sensation intact and symmetric throughout upper and lower extremities, no extinction on double simultaneous stimulation   Coordination: no ataxia identified  Station/Gait:  deferred    Dysphagia Screen  Per Nursing    Stroke Scales    NIHSS  1a. Level of Consciousness 0-->Alert, keenly responsive   1b. LOC Questions 2-->Answers neither question correctly   1c. LOC  Commands 1-->Performs one task correctly   2.   Best Gaze 0-->Normal   3.   Visual 0-->No visual loss   4.   Facial Palsy 0-->Normal symmetrical movements   5a. Motor Arm, Left 0-->No drift, limb holds 90 (or 45) degrees for full 10 secs   5b. Motor Arm, Right 0-->No drift, limb holds 90 (or 45) degrees for full 10 secs   6a. Motor Leg, Left 0-->No drift, leg holds 30 degree position for full 5 secs   6b. Motor Leg, right 0-->No drift, leg holds 30 degree position for full 5 secs   7.   Limb Ataxia 0-->Absent   8.   Sensory 0-->Normal, no sensory loss   9.   Best Language 2-->Severe aphasia, all communication is through fragmentary expression, great need for inference, questioning, and guessing by the listener. Range of information that can be exchanged is limited, listener carries burden of. . . (see row details)   10. Dysarthria 1-->Mild-to-moderate dysarthria, patient slurs at least some words and, at worst, can be understood with some difficulty   11. Extinction and Inattention  0-->No abnormality   Total 6 (11/02/22 1003)         Imaging  I personally reviewed all imaging; relevant findings per HPI.    Labs Data   CBC  Recent Labs   Lab 11/02/22  0457 11/01/22  1041   WBC 7.1 6.5   RBC 3.69* 4.70   HGB 11.7 14.4   HCT 34.1* 44.7    280     Basic Metabolic Panel   Recent Labs   Lab 11/02/22  0752 11/02/22  0457 11/01/22  2139 11/01/22  1449 11/01/22  1041   NA  --  138  --   --  136   POTASSIUM  --  4.0  --   --  4.4   CHLORIDE  --  106  --   --  100   CO2  --  27  --   --  24   BUN  --  21  --   --  27   CR  --  0.72  --   --  0.94   GLC 86 87 102*   < > 106*   JAMES  --  8.6  --   --  9.9    < > = values in this interval not displayed.     Liver Panel  No results for input(s): PROTTOTAL, ALBUMIN, BILITOTAL, ALKPHOS, AST, ALT, BILIDIRECT in the last 168 hours.  INR    Recent Labs   Lab Test 11/02/22  0457 11/01/22  1041   INR 1.12 1.01      Lipid Profile    Recent Labs   Lab Test 11/01/22  1041  07/08/16  1431 07/06/15  1403   CHOL 164 211* 196   HDL 53 65 56   LDL 79 126 121   TRIG 162* 99 95     A1C    Recent Labs   Lab Test 11/01/22  1041   A1C 5.9*     Troponin    Recent Labs   Lab 11/01/22  1041   TROPONINIS 8          Stroke Consult Data Data   This was a non-emergent, non-telestroke consult.

## 2022-11-02 NOTE — PROGRESS NOTES
SLP Bedside Swallow Evaluation  11/02/22 1016   Appointment Info   Signing Clinician's Name / Credentials (SLP) Neela Wu MA [facility] SLP   General Information   Onset of Illness/Injury or Date of Surgery 11/01/22   Referring Physician Dr. Lopez   Patient/Family Therapy Goal Statement (SLP) Unable to state due to aphasia   Pertinent History of Current Problem Dx: L CVI, COVID +   General Observations Pt was  alert and sitting up in a chair, however, pt had difficulty communicating and following commands due to aphasia/apraxia.  Voicing was quiet, jargon was produced.   Type of Evaluation   Type of Evaluation Swallow Evaluation   Oral Motor   Oral Musculature   (Asymmetry on R)   Dentition (Oral Motor)   Dentition (Oral Motor) adequate dentition   Cough/Swallow/Gag Reflex (Oral Motor)   Comment, Cough/Swallow/Gag Reflex (Oral Motor) Weak cough noted after trials of thin by cup   Vocal Quality/Secretion Management (Oral Motor)   Comment, Vocal Quality/Secretion Management (Oral Motor) Weak voicing   General Swallowing Observations   Respiratory Support (General Swallowing Observations) none   Current Diet/Method of Nutritional Intake (General Swallowing Observations, NIS) NPO   Swallowing Evaluation Clinical swallow evaluation   Clinical Swallow Evaluation   Feeding Assistance frequent cues/help required   Clinical Swallow Evaluation Textures Trialed thin liquids;mildly thick liquids;pureed;soft & bite-sized   Clinical Swallow Eval: Thin Liquid Texture Trial   Mode of Presentation, Thin Liquids spoon;cup   Volume of Liquid or Food Presented ice chips x 4,  sips by cup x 3   Oral Phase of Swallow premature pharyngeal entry   Pharyngeal Phase of Swallow repeated swallows  (delayed swallows)   Diagnostic Statement delayed weak coughing after trials by cup, poor swallow coordination, anterior loss of bolus on R   Clinical Swallow Eval: Mildly Thick Liquids   Mode of Presentation spoon;cup   Volume Presented tsps x 5,  sips by cup x 4   Oral Phase WFL   Pharyngeal Phase repeated swallows;reduction in laryngeal movement  (delayed swallows)   Diagnostic Statement no overt signs of aspiration   Clinical Swallow Evaluation: Puree Solid Texture Trial   Mode of Presentation, Puree spoon   Volume of Puree Presented tsps x 3   Oral Phase, Puree WFL   Pharyngeal Phase, Puree repeated swallows;reduction in laryngeal movement  (delay)   Diagnostic Statement no overt signs of aspiration   Clinical Swallow Eval: Soft & Bite Sized   Mode of Presentation spoon   Volume Presented tsps x 2   Oral Phase residue in oral cavity;impaired mastication;premature pharyngeal entry   Pharyngeal Phase repeated swallows;reduction in laryngeal movement  (delay)   Diagnostic Statement suspect pharyngeal residue given decreased elevation   Swallowing Recommendations   Diet Consistency Recommendations pureed (level 4);mildly thick liquids (level 2)   Supervision Level for Intake 1:1 supervision needed   Mode of Delivery Recommendations no straws;bolus size, small;slow rate of intake   Swallowing Maneuver Recommendations extra swallow;effortful (hard) swallow  (verify/cue swallows)   Monitoring/Assistance Required (Eating/Swallowing) check mouth frequently for oral residue/pocketing;monitor for cough or change in vocal quality with intake   Recommended Feeding/Eating Techniques (Swallow Eval) maintain upright posture during/after eating for 30 minutes   Medication Administration Recommendations, Swallowing (SLP) Crush with puree   Instrumental Assessment Recommendations   (to be determined)   Comment, Swallowing Recommendations Hold po if increased aspiration signs are present/respiratory status declines   Clinical Impression   Criteria for Skilled Therapeutic Interventions Met (SLP Eval) Yes, treatment indicated   SLP Diagnosis Mild-moderate oral-pharyngeal dysphagia   Risks & Benefits of therapy have been explained evaluation/treatment results reviewed;care  plan/treatment goals reviewed;risks/benefits reviewed;current/potential barriers reviewed;participants voiced agreement with care plan;participants included;patient   Clinical Impression Comments Mild-moderate oral-pharyngeal dysphagia was observed during today's bedside swallow evaluation.  Deficits/risk factors include weak voicing and cough, oral apraxia, delayed swallows, need for multiple swallows, decreased laryngeal elevation, and weak coughing after thin liquids by cup. Recommend cautious initiation of an IDDSI Diet level 4 and mildly thick liquids with 1:1 supervision/assist and cues to use safe swallow strategies.  Hold po if increased aspiration signs are present/respiratory status declines.  Plan to continue swallow Tx with ongoing assessment of diet tolerance and safety for upgrades.  Will determine need for FEES/VFSS during the course of swallow Tx.   SLP Total Evaluation Time   Eval: oral/pharyngeal swallow function, clinical swallow Minutes (85055) 15   Interventions   Interventions Quick Adds Swallowing Dysfunction   Swallowing Dysfunction &/or Oral Function for Feeding   Treatment of Swallowing Dysfunction &/or Oral Function for Feeding Minutes (10814) 10   Symptoms Noted During/After Treatment None   Treatment Detail/Skilled Intervention Swallow: Educated  pt and RN regarding current deficits, recommended po diet and precautions/strategies.  Pt will need continued education due to aphasia.  RN verbalized understanding.  Pt was given mod-max assist/cues  for use of strategies in Tx.  Cough noted after thin by cup despite hand over hand assist and cues for small sip.   SLP Discharge Planning   SLP Plan SLP - assess po  tolerance/safety for upgrades; S-L eval   SLP Discharge Recommendation [facility] patient will benefit from intensive, interdisciplinary therapy.  Anticipate will be able to tolerate 3 hours of therapy per day   SLP Rationale for DC Rec Swallow and communication are  well below baseline level of function; good participation   SLP Brief overview of current status  Mild-moderate dysphagia; Significant apraxia and aphasia also observed. Rec: cautious initiation of an IDDSI Diet level 4 puree diet and mildly thick liquids with 1:1 supervision/assist and cues to use safe swallow strategies.  Hold po if increased aspiration signs are present/respiratory status declines   Total Session Time   Total Session Time (sum of timed and untimed services) 25

## 2022-11-02 NOTE — PROVIDER NOTIFICATION
Brief update:    Paged re: update on MRI.    Strokes in multiple vascular territories, some petechial hemorrhage related to known L CVA    Concern for cardioembolic source here. Note plan for TTE.    Jonas De Oliveira MD  12:17 AM

## 2022-11-02 NOTE — PLAN OF CARE
Pt very pleasant. Some anxiety and tearfulness this morning. Reassurance given and comforted pt.  Sleeping on an off throughout shift. Neuros remain unchanged. RUE weakness. Does not follow commands for proper neuro testing but appears ataxic on right side. When using right hand to eat, spoon goes to right side of face. Answers to yes/no questions. Daughter and son updated via phone and ipad at bedside. Family talked with pt multiple times today via face time. Up to chair x 1 today for approx 1 hour. Tolerated well. Cont to monitor.         Goal Outcome Evaluation:  Problem: Stroke, Ischemic (Includes Transient Ischemic Attack)  Goal: Optimal Cognitive Function  Outcome: Progressing     Problem: Stroke, Ischemic (Includes Transient Ischemic Attack)  Goal: Improved Communication Skills  Outcome: Progressing     Problem: Stroke, Ischemic (Includes Transient Ischemic Attack)  Goal: Effective Oxygenation and Ventilation  Intervention: Optimize Oxygenation and Ventilation  Recent Flowsheet Documentation  Taken 11/2/2022 1800 by Yasmin Uriostegui, RN  Head of Bed (HOB) Positioning: HOB at 30-45 degrees  Taken 11/2/2022 1600 by Yasmin Uriostegui, RN  Head of Bed (HOB) Positioning: HOB at 30 degrees     Problem: Stroke, Ischemic (Includes Transient Ischemic Attack)  Goal: Optimal Eating and Swallowing without Aspiration  Outcome: Progressing

## 2022-11-03 ENCOUNTER — APPOINTMENT (OUTPATIENT)
Dept: SPEECH THERAPY | Facility: CLINIC | Age: 82
DRG: 023 | End: 2022-11-03
Payer: COMMERCIAL

## 2022-11-03 ENCOUNTER — APPOINTMENT (OUTPATIENT)
Dept: OCCUPATIONAL THERAPY | Facility: CLINIC | Age: 82
DRG: 023 | End: 2022-11-03
Payer: COMMERCIAL

## 2022-11-03 LAB
ANION GAP SERPL CALCULATED.3IONS-SCNC: 6 MMOL/L (ref 3–14)
BUN SERPL-MCNC: 12 MG/DL (ref 7–30)
CALCIUM SERPL-MCNC: 8.7 MG/DL (ref 8.5–10.1)
CHLORIDE BLD-SCNC: 108 MMOL/L (ref 94–109)
CO2 SERPL-SCNC: 24 MMOL/L (ref 20–32)
CREAT SERPL-MCNC: 0.58 MG/DL (ref 0.52–1.04)
ERYTHROCYTE [DISTWIDTH] IN BLOOD BY AUTOMATED COUNT: 13 % (ref 10–15)
GFR SERPL CREATININE-BSD FRML MDRD: 90 ML/MIN/1.73M2
GLUCOSE BLD-MCNC: 107 MG/DL (ref 70–99)
HCT VFR BLD AUTO: 33.7 % (ref 35–47)
HGB BLD-MCNC: 11.8 G/DL (ref 11.7–15.7)
MCH RBC QN AUTO: 31.6 PG (ref 26.5–33)
MCHC RBC AUTO-ENTMCNC: 35 G/DL (ref 31.5–36.5)
MCV RBC AUTO: 90 FL (ref 78–100)
PLATELET # BLD AUTO: 236 10E3/UL (ref 150–450)
POTASSIUM BLD-SCNC: 4 MMOL/L (ref 3.4–5.3)
RBC # BLD AUTO: 3.74 10E6/UL (ref 3.8–5.2)
SODIUM SERPL-SCNC: 138 MMOL/L (ref 133–144)
WBC # BLD AUTO: 6.9 10E3/UL (ref 4–11)

## 2022-11-03 PROCEDURE — 99232 SBSQ HOSP IP/OBS MODERATE 35: CPT | Performed by: HOSPITALIST

## 2022-11-03 PROCEDURE — 120N000001 HC R&B MED SURG/OB

## 2022-11-03 PROCEDURE — 250N000013 HC RX MED GY IP 250 OP 250 PS 637: Performed by: HOSPITALIST

## 2022-11-03 PROCEDURE — 99233 SBSQ HOSP IP/OBS HIGH 50: CPT | Mod: FS | Performed by: PSYCHIATRY & NEUROLOGY

## 2022-11-03 PROCEDURE — 250N000011 HC RX IP 250 OP 636: Performed by: HOSPITALIST

## 2022-11-03 PROCEDURE — 85027 COMPLETE CBC AUTOMATED: CPT | Performed by: HOSPITALIST

## 2022-11-03 PROCEDURE — 36415 COLL VENOUS BLD VENIPUNCTURE: CPT | Performed by: HOSPITALIST

## 2022-11-03 PROCEDURE — 92523 SPEECH SOUND LANG COMPREHEN: CPT | Mod: GN | Performed by: SPEECH-LANGUAGE PATHOLOGIST

## 2022-11-03 PROCEDURE — 92507 TX SP LANG VOICE COMM INDIV: CPT | Mod: GN | Performed by: SPEECH-LANGUAGE PATHOLOGIST

## 2022-11-03 PROCEDURE — 99232 SBSQ HOSP IP/OBS MODERATE 35: CPT | Performed by: PSYCHIATRY & NEUROLOGY

## 2022-11-03 PROCEDURE — 92526 ORAL FUNCTION THERAPY: CPT | Mod: GN | Performed by: SPEECH-LANGUAGE PATHOLOGIST

## 2022-11-03 PROCEDURE — 99356 PR PROLONGED SERV,INPATIENT,1ST HR: CPT | Mod: FS | Performed by: PSYCHIATRY & NEUROLOGY

## 2022-11-03 PROCEDURE — 80048 BASIC METABOLIC PNL TOTAL CA: CPT | Performed by: HOSPITALIST

## 2022-11-03 PROCEDURE — 97535 SELF CARE MNGMENT TRAINING: CPT | Mod: GO

## 2022-11-03 RX ADMIN — POLYETHYLENE GLYCOL 400 AND PROPYLENE GLYCOL 1 DROP: 4; 3 SOLUTION/ DROPS OPHTHALMIC at 19:57

## 2022-11-03 RX ADMIN — SERTRALINE HYDROCHLORIDE 25 MG: 25 TABLET ORAL at 08:28

## 2022-11-03 RX ADMIN — METOPROLOL TARTRATE 25 MG: 25 TABLET, FILM COATED ORAL at 19:57

## 2022-11-03 RX ADMIN — POLYETHYLENE GLYCOL 400 AND PROPYLENE GLYCOL 1 DROP: 4; 3 SOLUTION/ DROPS OPHTHALMIC at 08:29

## 2022-11-03 RX ADMIN — FAMOTIDINE 20 MG: 10 INJECTION INTRAVENOUS at 08:29

## 2022-11-03 RX ADMIN — ATORVASTATIN CALCIUM 20 MG: 10 TABLET, FILM COATED ORAL at 19:57

## 2022-11-03 RX ADMIN — FAMOTIDINE 20 MG: 10 INJECTION INTRAVENOUS at 19:57

## 2022-11-03 RX ADMIN — CLOPIDOGREL BISULFATE 75 MG: 75 TABLET ORAL at 08:28

## 2022-11-03 RX ADMIN — ENOXAPARIN SODIUM 40 MG: 40 INJECTION SUBCUTANEOUS at 16:36

## 2022-11-03 ASSESSMENT — ACTIVITIES OF DAILY LIVING (ADL)
ADLS_ACUITY_SCORE: 45
ADLS_ACUITY_SCORE: 49
ADLS_ACUITY_SCORE: 45
ADLS_ACUITY_SCORE: 49

## 2022-11-03 NOTE — PROGRESS NOTES
[facility]    Stroke Progress Note    Interval EventsNo significant interval change overnight. Exam continues to show mixed expressive and receptive aphasia. Called daughters Sapna and Sirisha with updates; unfortunately neither of them are aware of the severity of Lynsey's allergy to aspirin.     HPI Summary  Neena Castellano is a 82 year old female with recent COVID diagnosis on Paxlovid, HTN and prior tobacco use. She presented to the ED 11/1/22 for evaluation of speech changes. LKW was bedtime (1000) the night prior, then was seen by her daughter at 0920 the next morning with significant speech difficulties. Presenting /81. CT with early ischemic changes in left MCA territory with M2 occlusion; CT perfusion with large mismatch volume and was sent for mechanical thrombectomy with TICI 0.     Stroke Evaluation Summarized     MRI/Head CT Brain MRI 11/1: acute and subacute infarcts of left MCA territory and one foci of infarct in right frontal region; small petechial hemorrhage on GRE; mild to moderate chronic small vessel disease   Dual energy CT head 11/1: no evidence of hemorrhage   CT head 11/1: early ischemic changes of left MCA territory  CT perfusion: small core infarct in left MCA territory with moderate surrounding penumbra, mismatch ratio of 7.9    Intracranial Vasculature CTA: left M2 occlusion   Cervical Vasculature CTA: mild bilateral carotid atherosclerosis without significant stenosis       Echocardiogram LVEF 60-65%, no regional wall motion abnormalities, left atria mildly dilated    EKG/Telemetry Sinus bradycardia    Other Testing Not Applicable       LDL  11/1/2022: 79 mg/dL   A1C  11/1/2022: 5.9 %   Troponin 11/1/2022: 8 ng/L     Impression  1. Acute/subacute infarcts, primarily in left MCA distribution but with involvement of left frontal region as well, concerning for central source/embolic stroke of undetermined source (ESUS)     2. COVID +     Plan  - Q 4 hour  "neurochecks   - continue Plavix 75mg daily for secondary stroke prevention--allergic to ASA, reaction unknown   - BP goal <180  -LDL 79 (goal 40-70); initiate Lipitor 20mg with ongoing outpatient titration to achieve LDL goal   -Blood glucose monitoring, Hgb A1c 5.9%, (goal <7% for secondary stroke prevention), follow-up with PCP  - Telemetry, EKG  - Bedside Glucose Monitoring  - PT/OT/SLP  - Stroke Education  - Euthermia, Euglycemia  - will need 30 day cardiac event monitor upon discharge if afib not captured on tele (ordered)     Patient Follow-up    - in the next 1-2 week(s) with PCP  - in 6-8 weeks with general neurology (ordered; [phone])    We will continue to follow.     Melly VILLAR, CNP  Vascular Neurology  To page me or covering stroke neurology team member, click here: AMCOM   Choose \"On Call\" tab at top, then search dropdown box for \"Neurology Adult\", select location, press Enter, then look for stroke/neuro ICU/telestroke.  ______________________________________________________    Clinically Significant Risk Factors Present on Admission                 Medications   Scheduled Meds    atorvastatin  20 mg Oral or Feeding Tube QPM     clopidogrel  75 mg Oral Daily     enoxaparin ANTICOAGULANT  40 mg Subcutaneous Q24H     famotidine  20 mg Intravenous Q12H     metoprolol tartrate  25 mg Oral BID     polyethylene glycol-propylene glycol PF  1 drop Both Eyes BID     sertraline  25 mg Oral Daily     sodium chloride (PF)  3 mL Intracatheter Q8H       Infusion Meds    - MEDICATION INSTRUCTIONS -         PRN Meds  albuterol, lidocaine 4%, lidocaine (buffered or not buffered), - MEDICATION INSTRUCTIONS -, melatonin, ondansetron **OR** ondansetron, polyethylene glycol-propylene glycol, sodium chloride (PF)       PHYSICAL EXAMINATION  Temp:  [97.9  F (36.6  C)-98.7  F (37.1  C)] 98.7  F (37.1  C)  Pulse:  [55-89] 56  Resp:  [9-44] 11  BP: (126-172)/() 153/73  SpO2:  [90 %-98 %] 95 %      General " Exam  General:  Patient sitting in chair  without any acute distress    HEENT:  normocephalic/atraumatic  Pulmonary:  no respiratory distress      Neuro Exam  Mental Status:  alert, appears oriented to self but unable to answer any other orientation questions; able to follow 1/3 commands, intermittent unintelligible verbal output with mixed receptive and expressive aphasia   Cranial Nerves:  visual fields intact, PERRL, EOMI with normal smooth pursuit, facial sensation intact and symmetric, facial movements symmetric, hearing not formally tested but intact to conversation, mild dysarthria  Motor:  normal muscle tone and bulk, no abnormal movements, able to move all limbs spontaneously, no evidence of focal/asymmetric weakness     Stroke Scales    NIHSS  1a. Level of Consciousness 0-->Alert, keenly responsive   1b. LOC Questions 0-->Answers both questions correctly   1c. LOC Commands 0-->Performs both tasks correctly   2.   Best Gaze 0-->Normal   3.   Visual 0-->No visual loss   4.   Facial Palsy 0-->Normal symmetrical movements   5a. Motor Arm, Left 0-->No drift, limb holds 90 (or 45) degrees for full 10 secs   5b. Motor Arm, Right 0-->No drift, limb holds 90 (or 45) degrees for full 10 secs   6a. Motor Leg, Left 0-->No drift, leg holds 30 degree position for full 5 secs   6b. Motor Leg, right 0-->No drift, leg holds 30 degree position for full 5 secs   7.   Limb Ataxia 0-->Absent   8.   Sensory 0-->Normal, no sensory loss   9.   Best Language 2-->Severe aphasia, all communication is through fragmentary expression, great need for inference, questioning, and guessing by the listener. Range of information that can be exchanged is limited, listener carries burden of. . . (see row details)   10. Dysarthria 1-->Mild-to-moderate dysarthria, patient slurs at least some words and, at worst, can be understood with some difficulty   11. Extinction and Inattention  0-->No abnormality   Total 3 (11/03/22 7107)       Imaging  I  personally reviewed all imaging; relevant findings per HPI.     Lab Results Data   CBC  Recent Labs   Lab 11/03/22  0546 11/02/22  0457 11/01/22  1041   WBC 6.9 7.1 6.5   RBC 3.74* 3.69* 4.70   HGB 11.8 11.7 14.4   HCT 33.7* 34.1* 44.7    207 280     Basic Metabolic Panel    Recent Labs   Lab 11/03/22  0546 11/02/22  2122 11/02/22  0752 11/02/22  0457 11/01/22  1449 11/01/22  1041     --   --  138  --  136   POTASSIUM 4.0  --   --  4.0  --  4.4   CHLORIDE 108  --   --  106  --  100   CO2 24  --   --  27  --  24   BUN 12  --   --  21  --  27   CR 0.58  --   --  0.72  --  0.94   * 110* 86 87   < > 106*   JAMES 8.7  --   --  8.6  --  9.9    < > = values in this interval not displayed.     Liver Panel  No results for input(s): PROTTOTAL, ALBUMIN, BILITOTAL, ALKPHOS, AST, ALT, BILIDIRECT in the last 168 hours.  INR    Recent Labs   Lab Test 11/02/22  0457 11/01/22  1041   INR 1.12 1.01      Lipid Profile    Recent Labs   Lab Test 11/01/22  1041 07/08/16  1431 07/06/15  1403   CHOL 164 211* 196   HDL 53 65 56   LDL 79 126 121   TRIG 162* 99 95     A1C    Recent Labs   Lab Test 11/01/22  1041   A1C 5.9*     Troponin    Recent Labs   Lab 11/01/22  1041   TROPONINIS 8        Billing: I have personally spent a total of 60 minutes providing care today, time spent in reviewing medical records and reviewing tests, examining the patient and obtaining history, coordination of care, and discussion with the patient and/or family regarding diagnostic results, prognosis, symptom management, risks and benefits of management options, and development of plan of care. Greater than 50% was spent in counseling and coordination of care.

## 2022-11-03 NOTE — PROVIDER NOTIFICATION
Brief update:    Paged as patient is pulling at IV tubing    No longer n.p.o.    Discontinued maintenance fluid at 75/h.    Jonas De Oliveira MD  3:21 AM

## 2022-11-03 NOTE — PLAN OF CARE
Shift Summary  Assumed cares from [id number] when patient transferred out of the ICU.     Neuro: Opens eyes spontaneously, follows some commands although inconsistently. See neuro flow-sheet for detailed neurological exam information.   Cardiac: SB in 50's. Pt meeting blood pressure goals without the use of antihypertensives.   Pulmonary: LSC, on room air.  GI: No bowel movement this shift. Pt takes pills whole with applesauce.   : No madrid in place. Pt uses bedside commode.   Integumentary: See flow-sheet  Restraints: Not needed  Activity: Chairfast    Plan of care has been explained to patient: Yes    Ceci Mosqueda RN

## 2022-11-03 NOTE — CONSULTS
10/28- positive for COVID, Patient fits criteria for 10 day infection window. Patient can be recovered 11/9 as long as there is improvement.

## 2022-11-03 NOTE — PROGRESS NOTES
Pt alert, pleasant. Having expressive/receptive aphasia, rambling/incoherent language. Difficulty following some commands and has periods of restlessness/impulsivness (bed alarm on for safety, did set off multiple times). Neuros remain unchanged. VSS on RA, tele SB. Tolerating pureed diet, did well with crushed meds/applesauce- IVF discontinued. Large BM overnight, voiding frequently in BSC, one instance of incontinence. Plan to transfer out of ICU when bed becomes available.

## 2022-11-03 NOTE — PROGRESS NOTES
[facility]    Medicine Progress Note - Hospitalist Service    Date of Admission:  11/1/2022    Assessment & Plan     Neena Castellano is 82 and presents with acute left M2 occlusion on CT imaging, who underwent endovascular treatment with 2 passes and reduction of overall thrombus burden, being admitted to the ICU for post-thrombectomy care.    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 with reduction of thrombus burden On 11/01/2022  - On exam she still has aphasia and not able to follow commands and has word salad and she is able to move her legs and arms   -  MRI brain showed Subacute ischemia posterior left insular cortex and mid left temporal lobe with petechial hemorrhage and Acute ischemia posterior left frontal to left parietal lobes along with small foci within the anterior frontal lobes bilaterally, left posterior parietal lobe along with left thalamus and mild diffuse age related changes   -ECHO was done on 11/02/2022 and shows ef of 60-65%, no regional wall motion changes and RV is normal in structure, function and size and mild (1+) mitral regurgitation and no pericardial effusion.   -Blood pressure goal is <180 and metoprolol has been started  -HBAIC is 5.9  -LDL is 79 with goal of 40-70 and started on lipitor 20 mg daily  -  She was Initiated plavix 300mg x1 loading dose followed by 75mg every day --pt allergic to ASA  -Continue with physical therapy, Occupational Therapy and she was seen by speech and diet as ordered  -She will need 30-day cardiac event monitor upon discharge if A. fib is not captured on telemetry  -she will need to follow as outpatient with neurology and PCP  - will need ARU and we will see how she does by am and talk to care coordinator team    Recent covid 19 infection  - she was on paxlovid at home and she is on room air and  And per  daughter she tested positive at home on either 10/26 or 10/27l  -crp and ferritin checked and were normal;   - continue with Lovenox for dvt prophlaxis     Hyperlipidemia   -We will continue the patient on her Lipitor.     History of intermittent asthma  - Her lungs are clear and continue with prn albuterol and Atrovent nasal spray.       Hypertension    -continue with metoprolol and of note as she is on puree diet we cant crush metoprolol xl and that's why she was started on metoprolol bid and we will monitor her bp    Seasonal allergies  - We will continue the patient on Zyrtec and Restasis.    Depression   -We will continue the patient on Celexa            Diet: Combination Diet Pureed Diet (level 4); Mildly Thick (level 2); No Straws    DVT Prophylaxis: Pneumatic Compression Devices, lovenox   Agyle Catheter: Not present  Central Lines: None  Cardiac Monitoring: ACTIVE order. Indication: ICU  Code Status: Full Code      Disposition Plan      Expected Discharge Date: 11/05/2022                The patient's care was discussed with the Bedside Nurse, Patient and Patient's Family.  I updated daughter mi at 206 pm and she was updated       Jeet Arango MD  Hospitalist Service  [facility]  Securely message with the Vocera Web Console (learn more here)  Text page via [employer] Paging/Directory         Clinically Significant Risk Factors                                ______________________________________________________________________    Interval History     Saw pt in room 1704 and sitter was present and patient said hi back to me   No fever or chills   She continues to be same as yesterday and still has aphasia and work salad and tries to speak but words are non comprehensible    Spoke with RN     Data reviewed today: I reviewed all medications, new labs and imaging results over the last 24 hours. I personally reviewed       Physical Exam   Vital Signs: Temp: 98.7  F (37.1  C) Temp src: Oral  BP: (!) 153/73 Pulse: 56   Resp: 11 SpO2: 95 % O2 Device: None (Room air) Oxygen Delivery: 2 LPM  Weight: 113 lbs 12.12 oz        General: Patient appears comfortable  But has aphasia  HEENT: Head is atraumatic, normocephalic.  Pupils are equal, round and reactive to light.  No scleral icterus. Oral mucosa is moist   Neck: Neck is supple   Respiratory: Lungs are clear to auscultation bilaterally with no wheeze or crackles   Cardiovascular: Regular rate , S1 and S2 normal with no murmer or rubs or gallops  Abdomen:   soft , non tender , non distended and bowel sound present   Skin: No skin rashes .  Neurologic: limited exam given her aphasia   Musculoskeletal:  Seen moving upper and lower extremity upper and lower extremities bilaterally   Psychiatric:tries to be cooperative    Data   Recent Labs   Lab 22  0546 222 22  0752 22  0457 22  1449 22  1041   WBC 6.9  --   --  7.1  --  6.5   HGB 11.8  --   --  11.7  --  14.4   MCV 90  --   --  92  --  95     --   --  207  --  280   INR  --   --   --  1.12  --  1.01     --   --  138  --  136   POTASSIUM 4.0  --   --  4.0  --  4.4   CHLORIDE 108  --   --  106  --  100   CO2 24  --   --  27  --  24   BUN 12  --   --  21  --  27   CR 0.58  --   --  0.72  --  0.94   ANIONGAP 6  --   --  5  --  12   JAMES 8.7  --   --  8.6  --  9.9   * 110* 86 87   < > 106*    < > = values in this interval not displayed.     Recent Results (from the past 24 hour(s))   Echocardiogram Complete - For age > 60 yrs   Result Value    LVEF  60-65%    Narrative    [id number]  [id number]  [id number]  [id number]^VAN^SAMIR^THEODORA     [facility]  Echocardiography Laboratory  [facility] [place] [address]     Name: JW SMITH  MRN: [id number]  : 1940  Study Date: 2022 10:26 AM  Age: 82 yrs  Gender: Female  Patient Location: [facility]  Reason For Study: Cerebrovascular Incident  Ordering Physician: SAMIR ARAUJO  THEODORA  Referring Physician: Bridget Lopez  Performed By: Jayce Garcia RDCS     BSA: 1.5 m2  Height: 64 in  Weight: 113 lb  HR: 65  BP: 136/70 mmHg  ______________________________________________________________________________  Procedure  Complete Portable Echo Adult.  ______________________________________________________________________________  Interpretation Summary     Left ventricular systolic function is normal. The visual ejection fraction is  60-65%. No regional wall motion abnormalities noted.  The right ventricle is normal in structure, function and size.  The aortic valve is trileaflet with aortic valve sclerosis. There is trace  aortic regurgitation.  There is mild (1+) mitral regurgitation.  There is no pericardial effusion.     There are no prior studies available for comparison.  ______________________________________________________________________________  Left Ventricle  The left ventricle is normal in size. There is normal left ventricular wall  thickness. Left ventricular systolic function is normal. The visual ejection  fraction is 60-65%. Left ventricular diastolic function is normal. No regional  wall motion abnormalities noted.     Right Ventricle  The right ventricle is normal in structure, function and size.     Atria  Normal left atrial size. Right atrial size is normal. No clear evidence of  interatrial shunt on limited assessment with color Doppler.     Mitral Valve  There is mild (1+) mitral regurgitation.     Tricuspid Valve  There is trace to mild tricuspid regurgitation. The right ventricular systolic  pressure is approximated at 29.1 mmHg plus the right atrial pressure.     Aortic Valve  The aortic valve is trileaflet with aortic valve sclerosis. There is trace  aortic regurgitation.     Pulmonic Valve  The pulmonic valve is not well seen, but is grossly normal.     Vessels  The aortic root is normal size. Normal size ascending aorta. The inferior vena  cava was normal in size  with preserved respiratory variability.     Pericardium  There is no pericardial effusion.     ______________________________________________________________________________  MMode/2D Measurements & Calculations  IVSd: 1.0 cm  LVIDd: 3.5 cm  LVIDs: 2.3 cm  LVPWd: 0.78 cm  FS: 35.4 %  LV mass(C)d: 91.0 grams  LV mass(C)dI: 59.3 grams/m2     Ao root diam: 3.1 cm  LA dimension: 3.8 cm  LA/Ao: 1.2  LA Volume (BP): 50.6 ml  LA Volume Index (BP): 33.1 ml/m2  RWT: 0.44     Doppler Measurements & Calculations  MV E max jann: 139.0 cm/sec  MV A max jann: 46.6 cm/sec  MV E/A: 3.0  MV dec slope: 645.9 cm/sec2  MV dec time: 0.21 sec  PA acc time: 0.10 sec     TR max jann: 269.5 cm/sec  TR max P.1 mmHg  E/E' av.3  Lateral E/e': 13.7  Medial E/e': 18.8     ______________________________________________________________________________  Report approved by: Andreas Hooks 2022 02:17 PM           Medications     - MEDICATION INSTRUCTIONS -         atorvastatin  20 mg Oral or Feeding Tube QPM     clopidogrel  75 mg Oral Daily     enoxaparin ANTICOAGULANT  40 mg Subcutaneous Q24H     famotidine  20 mg Intravenous Q12H     metoprolol tartrate  25 mg Oral BID     polyethylene glycol-propylene glycol PF  1 drop Both Eyes BID     sertraline  25 mg Oral Daily     sodium chloride (PF)  3 mL Intracatheter Q8H

## 2022-11-03 NOTE — PLAN OF CARE
Reason for Admission: L MCA s/p thrombectomy    Cognitive/Mentation: A/Ox JEFFY  Neuros/CMS: Intact ex severe global aphasia, inconsistent with commands r/t aphasia,   VS: stable.   Tele: SB.  GI: BS active, + flatus, last BM 11/2. Continent.  : voiding. Continent.  Pulmonary: LS clear.  Pain: no signs of pain.     Drains/Lines: PIV  Skin: intact  Activity: Assist x SB with GB.  Diet: Pureed with thin liquids. Takes pills crushed in pudding.     Therapies recs: ARU  Discharge: pending    Aggression Stoplight Tool: Green    End of shift summary: Patient stable throughout shift. Arrived from ICU at 1230. Sitter discontinued at 1430. Patient can be impulsive when needing the bathroom but easily redirectable.

## 2022-11-03 NOTE — PROGRESS NOTES
11/03/22 1216   Appointment Info   Signing Clinician's Name / Credentials (SLP) Jaylin Rodriguez MS CCC SLP   General Information   Onset of Illness/Injury or Date of Surgery 11/01/22   Referring Physician Dr. Lopez   Patient/Family Therapy Goal Statement (SLP) Unable to state due to aphasia   Pertinent History of Current Problem Dx: L CVI, COVID +   General Observations Pt alert and upright in bed   Type of Evaluation   Type of Evaluation Speech, Language, Cognition   Motor Speech   Speech Intelligibility (Motor Speech) WNL   Resonance (Motor Speech) WNL   Rate/Prosody (Motor Speech) irregular pauses;monotone speech;rapid rate   Articulation (Motor Speech) errors with increasing word length;imprecise articulation   Western Aphasia Battery- Revised Bedside Record From   Spontaneous Speech Content Score (out of 10) 1   Spontaneous Speech Fluency Score (out of 10) 2   Auditory Verbal Comprehension Score (out of 10) 0   Sequential Commands Score (out of 10) 0   Repetition Score (out of 10) 0   Object Naming Score (out of 10) 0   Bedside Aphasia Sum 3   WAB-R Bedside Aphasia Score 5   Aphasia Severity Level Very Severe Aphasia   Auditory Comprehension   Follows Commands (Auditory Comprehension) unable/difficult to assess   Yes/No Questions (Auditory Comprehension) simple/factual questions;biographical/personal questions   Biographical/Personal Questions (Auditory Comprehension) impaired;0-24% accuracy   Simple/Factual Questions (Auditory Comprehension) impaired;0-24% accuracy   Verbal Expression   Automatic Speech (Verbal Expression) counting   Counting, Automatic Speech (Verbal Expression) unsuccessful with cues   Written Language   Written Expression (Written Language) single letters/symbols;word level   Functional Tasks (Written Language) name/signature   Letters/Symbols, Written Expression (Written Language) impaired   Word Level, Written Expression (Written Language) impaired   Name/Signature, Functional Tasks  (Written Language) impaired   Augmentative/Alternative Communication (AAC)   Gesture Mode (AAC) hand gestures   Hand Gestures (AAC) unable to elicit/no response   Cognition   Behavioral Issues (Cognition) overwhelmed easily;difficulty managing stress   Cognitive Function attention deficit   Orientation Status (Cognition) oriented to;situation;person   Affect/Mental Status (Cognition) anxious;agitated;confabulatory;emotionally labile   Attention Deficit (Cognition) perseverates on recent conversation   General Therapy Interventions   Planned Therapy Interventions Language   Language Auditory comprehension;Reading comprehension;Verbal expression;Written expression   Clinical Impression   Criteria for Skilled Therapeutic Interventions Met (SLP Eval) Yes, treatment indicated   SLP Diagnosis Moderate to severe receptive and expressive aphasia   Risks & Benefits of therapy have been explained evaluation/treatment results reviewed;care plan/treatment goals reviewed;risks/benefits reviewed;current/potential barriers reviewed;participants voiced agreement with care plan;participants included;patient   Clinical Impression Comments Pt unable to complete standardized assessment d/t severity of deficits and appearing quite anxious over the situation/deficits. Assisted in calling pt's daughter and pt appeared to improve and followed attempts with more attention, however, continued significaint receptive and expressive aphasia. Reviewed with pt and called her daughter.   SLP Total Evaluation Time   Eval: Sound production with lang comprehension and expression Minutes (10313) 35   SLP Goals   Therapy Frequency (SLP Eval) daily   SLP Predicted Duration/Target Date for Goal Attainment 11/08/22   SLP Goals Communication   SLP: Communicate basic wants and needs independent   Interventions   Interventions Quick Adds Speech, Language, Voice & Communication   Speech, Language, Voice Communication&/or Auditory Processing   Treatment of  Speech, Language, Voice Communication&/or Auditory Processing Minutes (46177) 20   Treatment Detail/Skilled Intervention Education provided to pt and her daughter on the phone. Trained strateiges and focus on yes/no questions to improve comprehension. All questions answered.   SLP Discharge Planning   SLP Plan SLP - assess po  tolerance/safety for upgrades; basic language   SLP Discharge Recommendation [facility]-Motivated patient will benefit from intensive, interdisciplinary therapy.  Anticipate will be able to tolerate 3 hours of therapy per day   SLP Rationale for DC Rec Swallow and communication are well below baseline level of function; good participation   SLP Brief overview of current status  Continue IDDSI level 4 (puree) with thin liquids by cup and straw. Pt fully upright and encourage pt to feed herself as able. Hold if overt s/sx of aspiration   Total Session Time   Total Session Time (sum of timed and untimed services) 55

## 2022-11-04 ENCOUNTER — APPOINTMENT (OUTPATIENT)
Dept: OCCUPATIONAL THERAPY | Facility: CLINIC | Age: 82
DRG: 023 | End: 2022-11-04
Payer: COMMERCIAL

## 2022-11-04 ENCOUNTER — APPOINTMENT (OUTPATIENT)
Dept: SPEECH THERAPY | Facility: CLINIC | Age: 82
DRG: 023 | End: 2022-11-04
Payer: COMMERCIAL

## 2022-11-04 ENCOUNTER — APPOINTMENT (OUTPATIENT)
Dept: PHYSICAL THERAPY | Facility: CLINIC | Age: 82
DRG: 023 | End: 2022-11-04
Attending: HOSPITALIST
Payer: COMMERCIAL

## 2022-11-04 PROCEDURE — 97535 SELF CARE MNGMENT TRAINING: CPT | Mod: GO | Performed by: OCCUPATIONAL THERAPIST

## 2022-11-04 PROCEDURE — 97530 THERAPEUTIC ACTIVITIES: CPT | Mod: GO | Performed by: OCCUPATIONAL THERAPIST

## 2022-11-04 PROCEDURE — 97161 PT EVAL LOW COMPLEX 20 MIN: CPT | Mod: GP | Performed by: PHYSICAL THERAPIST

## 2022-11-04 PROCEDURE — 120N000001 HC R&B MED SURG/OB

## 2022-11-04 PROCEDURE — 92526 ORAL FUNCTION THERAPY: CPT | Mod: GN | Performed by: SPEECH-LANGUAGE PATHOLOGIST

## 2022-11-04 PROCEDURE — 250N000013 HC RX MED GY IP 250 OP 250 PS 637: Performed by: HOSPITALIST

## 2022-11-04 PROCEDURE — 99231 SBSQ HOSP IP/OBS SF/LOW 25: CPT | Performed by: PSYCHIATRY & NEUROLOGY

## 2022-11-04 PROCEDURE — 99233 SBSQ HOSP IP/OBS HIGH 50: CPT | Mod: FS | Performed by: PSYCHIATRY & NEUROLOGY

## 2022-11-04 PROCEDURE — 97112 NEUROMUSCULAR REEDUCATION: CPT | Mod: GP | Performed by: PHYSICAL THERAPIST

## 2022-11-04 PROCEDURE — 250N000011 HC RX IP 250 OP 636: Performed by: HOSPITALIST

## 2022-11-04 PROCEDURE — 92507 TX SP LANG VOICE COMM INDIV: CPT | Mod: GN | Performed by: SPEECH-LANGUAGE PATHOLOGIST

## 2022-11-04 PROCEDURE — 99232 SBSQ HOSP IP/OBS MODERATE 35: CPT | Performed by: HOSPITALIST

## 2022-11-04 RX ADMIN — METOPROLOL TARTRATE 25 MG: 25 TABLET, FILM COATED ORAL at 20:09

## 2022-11-04 RX ADMIN — POLYETHYLENE GLYCOL 400 AND PROPYLENE GLYCOL 1 DROP: 4; 3 SOLUTION/ DROPS OPHTHALMIC at 08:52

## 2022-11-04 RX ADMIN — ENOXAPARIN SODIUM 40 MG: 40 INJECTION SUBCUTANEOUS at 18:01

## 2022-11-04 RX ADMIN — SERTRALINE HYDROCHLORIDE 25 MG: 25 TABLET ORAL at 08:52

## 2022-11-04 RX ADMIN — ATORVASTATIN CALCIUM 20 MG: 10 TABLET, FILM COATED ORAL at 20:09

## 2022-11-04 RX ADMIN — METOPROLOL TARTRATE 25 MG: 25 TABLET, FILM COATED ORAL at 08:51

## 2022-11-04 RX ADMIN — POLYETHYLENE GLYCOL 400 AND PROPYLENE GLYCOL 1 DROP: 4; 3 SOLUTION/ DROPS OPHTHALMIC at 20:09

## 2022-11-04 RX ADMIN — FAMOTIDINE 20 MG: 10 INJECTION INTRAVENOUS at 08:52

## 2022-11-04 RX ADMIN — CLOPIDOGREL BISULFATE 75 MG: 75 TABLET ORAL at 08:52

## 2022-11-04 RX ADMIN — FAMOTIDINE 20 MG: 10 INJECTION INTRAVENOUS at 20:09

## 2022-11-04 ASSESSMENT — ACTIVITIES OF DAILY LIVING (ADL)
ADLS_ACUITY_SCORE: 39
ADLS_ACUITY_SCORE: 45
ADLS_ACUITY_SCORE: 45
ADLS_ACUITY_SCORE: 39
ADLS_ACUITY_SCORE: 45
ADLS_ACUITY_SCORE: 39
ADLS_ACUITY_SCORE: 45
ADLS_ACUITY_SCORE: 45

## 2022-11-04 NOTE — PLAN OF CARE
Goal Outcome Evaluation:    Reason for Admission: L MCA stroke & L MCA occlusion s/p mechanical thrombectomy     Cognitive/Mentation: A/Ox JEFFY d/t severe global aphasia.  Neuros/CMS: Intact ex word finding difficulty & aphasia. Inconsistent when following commands.  VS: HTN but w/in parameters. SBP goal<180.   GI: BS active, + flatus, last BM 11/3/2022. Continent.  : WDL. Continent.  Pulmonary: LS clear.  Pain: none.     Drains/Lines: R PIV S.L.  Skin: R forearm abrasion.   Activity: SBA.  Diet: Combo soft & bite sized with thin liquids. Takes pills whole.     Therapies recs: ARU vs. Home w/ assist  Discharge: Pending    Aggression Stoplight Tool: GREEN    End of shift summary: Special precautions maintained d/t COVID+. Speech therapy advanced diet to combo diet soft & bit sized with thin liquids. Updated daughter (Sirisha), and daughter stated her mom as well as her family would prefer if pt went home w/ assist- they have a place for her to go that will have someone to stay with pt at all times.

## 2022-11-04 NOTE — PROGRESS NOTES
Care Management Follow Up    Length of Stay (days): 3    Expected Discharge Date: 11/06/2022     Concerns to be Addressed:       Patient plan of care discussed at interdisciplinary rounds: Yes    Anticipated Discharge Disposition: Home Care, Home     Anticipated Discharge Services:    Anticipated Discharge DME:      Patient/family educated on Medicare website which has current facility and service quality ratings:    Education Provided on the Discharge Plan:    Patient/Family in Agreement with the Plan:      Referrals Placed by CM/SW:    Private pay costs discussed: Not applicable    Additional Information:  Referral sent to [facility] for C RN, PT and OT.  They are reviewing.    aDniela Anglin RN, BSN, PHN  Inpatient Care Coordination  [facility]  Phone: [phone]

## 2022-11-04 NOTE — PLAN OF CARE
Goal Outcome Evaluation:       Alert, global aphasia. Inconsistently following commands, at  times able to respond appropriately. VSS. Tele NSR. Moving all 4 extremities, ambulates to  bathroom with SBA. Continent of bowel and bladder, had BM this afternoon. Denies pain. On a regular diet, good appetite. Facetimed this evening with family. Discharge plan to go back home, she lives with her daughter, pending home care set up. Neurology has signed off.

## 2022-11-04 NOTE — PLAN OF CARE
Reason for Admission: L MCA CVA s/p thrombectomy    Cognitive/Mentation: A/Ox JEFFY  Neuros/CMS: Intact ex severe global aphasia, inconsistent command following  VS: stable. <180  Tele: SB.   GI: Continent. Last BM 11/3.  : Continent.  Pulmonary: LS clear.  Pain: denies, no nonverbal signs.     Drains/Lines: PIV  Skin: intact ex R groin site, R forearm scab  Activity: SBA  Diet: Pureed with thin liquids. Takes pills whole with water    Therapies recs: ARU  Discharge: pending    End of shift summary: Pt stable overnight. Does not call appropriately, but easily redirectable. Covid precautions maintained

## 2022-11-04 NOTE — PROGRESS NOTES
[facility]    Medicine Progress Note - Hospitalist Service    Date of Admission:  11/1/2022    Assessment & Plan     Neena Castellano is 82 and presents with acute left M2 occlusion on CT imaging, who underwent endovascular treatment with 2 passes and reduction of overall thrombus burden, being admitted to the ICU for post-thrombectomy care.    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 with reduction of thrombus burden On 11/01/2022  - On exam she still has aphasia and not able to follow commands and has word salad and she is able to move her legs and arms   -  MRI brain showed Subacute ischemia posterior left insular cortex and mid left temporal lobe with petechial hemorrhage and Acute ischemia posterior left frontal to left parietal lobes along with small foci within the anterior frontal lobes bilaterally, left posterior parietal lobe along with left thalamus and mild diffuse age related changes   -ECHO was done on 11/02/2022 and shows ef of 60-65%, no regional wall motion changes and RV is normal in structure, function and size and mild (1+) mitral regurgitation and no pericardial effusion.   -Blood pressure goal is <180 and metoprolol has been started  -HBAIC is 5.9  -LDL is 79 with goal of 40-70 and started on lipitor 20 mg daily  -  She was Initiated plavix 300mg x1 loading dose followed by 75mg every day --pt allergic to ASA  -Continue with physical therapy, Occupational Therapy and speech and she will need ARU stay   -She will need 30-day cardiac event monitor upon discharge if A. fib is not captured on telemetry  -she will need to follow as outpatient with neurology and PCP  - will need ARU and Spoke with care coordinator and wont happen today as she had sitter till yesterday noon     Recent covid 19 infection  - she was on paxlovid at home and she is on room air  "and per infection control tested on 10/28 and last day is 11/9/2022  -crp and ferritin checked and were normal;   - continue with Lovenox for dvt prophlaxis     Hyperlipidemia   -We will continue the patient on her Lipitor.     History of intermittent asthma  - Her lungs are clear and continue with prn albuterol and Atrovent nasal spray.       Hypertension    -continue with metoprolol and of note as she is on puree diet we cant crush metoprolol xl and that's why she was started on metoprolol bid and we will monitor her bp    Seasonal allergies  - We will continue the patient on Zyrtec and Restasis.    Depression   -We will continue the patient on Celexa     Sinus bradycardia   - EKG on admission showed the same and she has normal BP and monitor        Diet: Combination Diet Pureed Diet (level 4); Thin Liquids (level 0); No Straws  Room Service    DVT Prophylaxis: Pneumatic Compression Devices, lovenox   Gayle Catheter: Not present  Central Lines: None  Cardiac Monitoring: ACTIVE order. Indication: ICU  Code Status: Full Code      Disposition Plan      Expected Discharge Date: 11/06/2022                ARU      The patient's care was discussed with the Bedside Nurse, Care Coordinator/, Patient and Patient's Family.      mi called today at 1006 am and she was not available and will call again        Jeet Arango MD  Hospitalist Service  [facility]  Securely message with the Vocera Web Console (learn more here)  Text page via [employer] Paging/Directory         Clinically Significant Risk Factors                              I am off service in am and her care to be taken over by hospital medicine team  ______________________________________________________________________    Interval History     Seen today and was sitting in the chair and spoke \" I am good today\"  And did move her arms and legs but continues to have aphasia . No fever or chills       Spoke with RN and blood " "pressures have been stable    Data reviewed today: I reviewed all medications, new labs and imaging results over the last 24 hours. I personally reviewed       Physical Exam   Vital Signs: Temp: 98.5  F (36.9  C) Temp src: Oral BP: (!) 144/76 Pulse: 62   Resp: 18 SpO2: 97 % O2 Device: None (Room air)    Weight: 112 lbs 3.43 oz        General: Patient was sitting in chair and was calm . Did speak \" I am good\"  HEENT: Head is atraumatic, normocephalic.  Pupils are equal, round and reactive to light.  No scleral icterus. Oral mucosa is moist   Neck: Neck is supple   Respiratory: Lungs are clear to auscultation bilaterally with no wheeze or crackles   Cardiovascular: Regular rate , S1 and S2 normal with no murmer or rubs or gallops  Abdomen:   soft , non tender , non distended and bowel sound present   Skin: No skin rashes .  Neurologic: limited exam given her aphasia   Musculoskeletal:  Seen moving upper and lower extremity upper and lower extremities bilaterally   Psychiatric:tries to be cooperative    Data   Recent Labs   Lab 11/03/22  0546 11/02/22  2122 11/02/22  0752 11/02/22  0457 11/01/22  1449 11/01/22  1041   WBC 6.9  --   --  7.1  --  6.5   HGB 11.8  --   --  11.7  --  14.4   MCV 90  --   --  92  --  95     --   --  207  --  280   INR  --   --   --  1.12  --  1.01     --   --  138  --  136   POTASSIUM 4.0  --   --  4.0  --  4.4   CHLORIDE 108  --   --  106  --  100   CO2 24  --   --  27  --  24   BUN 12  --   --  21  --  27   CR 0.58  --   --  0.72  --  0.94   ANIONGAP 6  --   --  5  --  12   JAMES 8.7  --   --  8.6  --  9.9   * 110* 86 87   < > 106*    < > = values in this interval not displayed.     No results found for this or any previous visit (from the past 24 hour(s)).  Medications     - MEDICATION INSTRUCTIONS -         atorvastatin  20 mg Oral or Feeding Tube QPM     clopidogrel  75 mg Oral Daily     enoxaparin ANTICOAGULANT  40 mg Subcutaneous Q24H     famotidine  20 mg Intravenous " Q12H     metoprolol tartrate  25 mg Oral BID     polyethylene glycol-propylene glycol PF  1 drop Both Eyes BID     sertraline  25 mg Oral Daily     sodium chloride (PF)  3 mL Intracatheter Q8H

## 2022-11-04 NOTE — PROGRESS NOTES
"      [facility]    Stroke Progress Note    Interval Kenny has persistent expressive and receptive aphasia (see exam below). No acute changes.     HPI Summary  Roxy \"Lynsey\" SARAH Castellano is an 83 yo F with pertinent past medical history of recent COVID-19 infection taking Paxlovid, HTN, former tobacco use disorder, on PTA estradiol cream, Lipitor 10 mg daily.    She presented to the ED 11/1/22 with speech changes. /81. CT/CTA with L MCA early ischemic changes and CTA with L M2 occlusion. CTP with large mismatch volume. S/p EVT TICI 0. MRI with L MCA ischemic infarcts    Stroke Evaluation Summarized    MRI/Head CT MRI: subacute L insular cortex and mid L temporal lobe ischemic infarct with petechial hemorrhage, acute ischemic infarcts to L frontal and L parietal lobes with small foci anterior b/l frontal lobe infarcts, L posterior parietal lobe along with L thalamus  CTH repeat: no ICH, mild iodine staining in L insular region  CTH: subtle assymetric hypoattenuation in posterior L insular cortex and L superior temporal gyrus concerning for acute ischemia (ASPECT 8), no evidence of ICH   Intracranial Vasculature CTA head: occlusion dominant inferior L M2 division   Cervical Vasculature CTA neck: mild b/l carotid bifurcation atherosclerosis without significant stenosis     Echocardiogram EF 60-65%, no wma, LA mild dilation   EKG/Telemetry Sinus bradycardia with PACs (bigeminy)   Other Testing IR carotid cerebral angiogram: clot burden decreased after two passes with solitaire stent retriever and imperative suction aspiration but residual distal thrombus, TICI 0     LDL  11/1/2022: 79 mg/dL   A1C  11/1/2022: 5.9 %   Troponin No lab value available in past 48 hrs       Impression   Acute and subacute L MCA territory infarcts in setting of L M2 occlusion suspected embolic stroke of undetermined source (ESUS) s/p EVT TICI 0, rule out atrial fibrillation in setting of LA " "dilation    Plan  -Discussed with vascular neurology attending, Dr. Ibrahim  -Neuro checks and vitals every 4 hours  - goal SBP <180 while inpatient, avoid hypotension  -long term outpatient blood pressure goal <130/80 to be achieved slowly over the next several weeks if able to tolerate without worsening symptoms, recommend home monitoring twice daily in AM and PM, keep log and bring to f/u with PCP  -Plavix 75 mg daily indefinitely (ASA allergy)  -LDL 79, Lipitor 20 mg daily, follow-up with PCP for titration to goal LDL 40-70, <40 increases risk of Intracranial hemorrhage  -Blood glucose monitoring, Hgb A1c 5.9%, prediabetes (at goal <7% for secondary stroke prevention), follow-up closely with PCP  -Mediterranean diet can be beneficial for overall decreased cardiovascular risk, please print hand out for patient at discharge  -telemetry, 30 day CardioNet monitoring at discharge if no Afib found on telemetry (ordered)  -PT/OT/SPT   -Smoking screen: former smoker, quit 1989  -Sleep Apnea screen: treat BALTAZAR, f/u with sleep medicine clinic if needed  -Euthermia, euglycemia, eunatremia   -Stroke Education  -Stroke Class per [facility])  -Discussed case with research team regarding possible JOSEPH clinical trial eligibility but she is not a candidate due to ASA allergy      Patient Follow-up    - in the next 1-2 week(s) with PCP  - in 6-8 weeks with general neurology (ordered; [phone])    No further stroke evaluation is recommended, so we will sign off. Please contact us with any additional questions.    Sharda Rhodes PA-C  Vascular Neurology  To page me or covering stroke neurology team member, click here: [facility]   Choose \"On Call\" tab at top, then search dropdown box for \"Neurology Adult\", select location, press Enter, then look for stroke/neuro ICU/telestroke.    ______________________________________________________    Clinically Significant Risk Factors Present on Admission      Medications "   Scheduled Meds    atorvastatin  20 mg Oral or Feeding Tube QPM     clopidogrel  75 mg Oral Daily     enoxaparin ANTICOAGULANT  40 mg Subcutaneous Q24H     famotidine  20 mg Intravenous Q12H     metoprolol tartrate  25 mg Oral BID     polyethylene glycol-propylene glycol PF  1 drop Both Eyes BID     sertraline  25 mg Oral Daily     sodium chloride (PF)  3 mL Intracatheter Q8H       Infusion Meds    - MEDICATION INSTRUCTIONS -         PRN Meds  albuterol, lidocaine 4%, lidocaine (buffered or not buffered), - MEDICATION INSTRUCTIONS -, melatonin, ondansetron **OR** ondansetron, polyethylene glycol-propylene glycol, sodium chloride (PF)       PHYSICAL EXAMINATION  Temp:  [97.6  F (36.4  C)-98.1  F (36.7  C)] 97.6  F (36.4  C)  Pulse:  [56-74] 63  Resp:  [14-18] 18  BP: (126-159)/(64-87) 159/79  SpO2:  [94 %-100 %] 100 %      General Exam  General:  Sitting in recliner without any acute distress  HEENT:  normocephalic/atraumatic  Pulmonary:  no respiratory distress    Neuro Exam  Mental Status: not able to answer orientation questions, follows only some limited commands due to severe receptive, not able to answer questions appropriately due to severe expressive aphasia    Cranial Nerves: no apparent visual field cut but not able to follow commands to test, does blink to threat b/l, PERRL, EOMI with normal smooth pursuit, facial movements grossly symmetric, does not answer questions for sensation testing but does grimace to noxious, hearing not formally tested but intact to conversation, mild dysarthria, tongue protrusion midline  Motor:  normal muscle tone and bulk, no abnormal movements, able to move all limbs spontaneously, strength 5/5 throughout upper and lower extremities, no pronator drift  Reflexes:  toes down-going  Sensory: grimaces and localizes to noxious, does not answer questions for sensory testing for symmetry, no apparent neglect but does not answer questions for testing  Coordination: does not follow  commands for finger to nose or heel to shin testing    Station/Gait:  deferred    Stroke Scales    NIHSS  1a. Level of Consciousness 0-->Alert, keenly responsive   1b. LOC Questions 2-->Answers neither question correctly   1c. LOC Commands 1-->Performs one task correctly   2.   Best Gaze 0-->Normal   3.   Visual 0-->No visual loss   4.   Facial Palsy 0-->Normal symmetrical movements   5a. Motor Arm, Left 0-->No drift, limb holds 90 (or 45) degrees for full 10 secs   5b. Motor Arm, Right 0-->No drift, limb holds 90 (or 45) degrees for full 10 secs   6a. Motor Leg, Left 0-->No drift, leg holds 30 degree position for full 5 secs   6b. Motor Leg, right 0-->No drift, leg holds 30 degree position for full 5 secs   7.   Limb Ataxia 0-->Absent   8.   Sensory 0-->Normal, no sensory loss   9.   Best Language 2-->Severe aphasia, all communication is through fragmentary expression, great need for inference, questioning, and guessing by the listener. Range of information that can be exchanged is limited, listener carries burden of. . . (see row details)   10. Dysarthria 0-->Normal   11. Extinction and Inattention  0-->No abnormality   Total 5 (11/04/22 1337)       Modified Newhall Score (Pre-morbid)  0 - No symptoms.  Modified Newhall Score (Discharge)  4 - Moderately severe disability.  Unable to attend to own bodily needs without assistance or unable to walk unassisted.    Imaging  I personally reviewed all imaging; relevant findings per HPI.     Lab Results Data   CBC  Recent Labs   Lab 11/03/22  0546 11/02/22 0457 11/01/22  1041   WBC 6.9 7.1 6.5   RBC 3.74* 3.69* 4.70   HGB 11.8 11.7 14.4   HCT 33.7* 34.1* 44.7    207 280     Basic Metabolic Panel    Recent Labs   Lab 11/03/22  0546 11/02/22 2122 11/02/22  0752 11/02/22 0457 11/01/22  1449 11/01/22  1041     --   --  138  --  136   POTASSIUM 4.0  --   --  4.0  --  4.4   CHLORIDE 108  --   --  106  --  100   CO2 24  --   --  27  --  24   BUN 12  --   --  21  --   27   CR 0.58  --   --  0.72  --  0.94   * 110* 86 87   < > 106*   JAMES 8.7  --   --  8.6  --  9.9    < > = values in this interval not displayed.     Liver Panel  No results for input(s): PROTTOTAL, ALBUMIN, BILITOTAL, ALKPHOS, AST, ALT, BILIDIRECT in the last 168 hours.  INR    Recent Labs   Lab Test 11/02/22  0457 11/01/22  1041   INR 1.12 1.01      Lipid Profile    Recent Labs   Lab Test 11/01/22  1041 07/08/16  1431 07/06/15  1403   CHOL 164 211* 196   HDL 53 65 56   LDL 79 126 121   TRIG 162* 99 95     A1C    Recent Labs   Lab Test 11/01/22  1041   A1C 5.9*     Troponin    Recent Labs   Lab 11/01/22  1041   TROPONINIS 8        Billing: I have personally spent a total of 45 minutes providing care today, time spent in reviewing medical records and reviewing tests, examining the patient and obtaining history, coordination of care, and discussion with the patient and/or family regarding diagnostic results, prognosis, symptom management, risks and benefits of management options, and development of plan of care. Greater than 50% was spent in counseling and coordination of care.

## 2022-11-04 NOTE — CONSULTS
Care Management Initial Consult    General Information  Assessment completed with: FamilyGildardoet  Type of CM/SW Visit: Initial Assessment    Primary Care Provider verified and updated as needed:     Readmission within the last 30 days:        Reason for Consult: discharge planning  Advance Care Planning:   None in chart.    Communication Assessment  Patient's communication style: spoken language (English or Bilingual)   aphasia    Cognitive  Cognitive/Neuro/Behavioral: .WDL except  Level of Consciousness: alert  Arousal Level: opens eyes spontaneously  Orientation: other (see comments) (JEFFY r/t aphasia)  Mood/Behavior: cooperative, calm  Best Language: 2 - Severe aphasia  Speech: illogical, word-finding difficulty    Living Environment:   People in home: child(mel), adult     Current living Arrangements: house      Able to return to prior arrangements: yes     Family/Social Support:  Care provided by:    Provides care for:    Marital Status:   Children          Description of Support System: Supportive, Involved    Pt lives with her daughterSapna who works from home and is able to provide assistance and supervision.  Pt's daughterSirisha and sonFidel live within 5 miles of the home and are able to provide added supports in evening and on weekends.     Current Resources:   Patient receiving home care services:  Pt is interested in receiving home care services at discharge. Home care agency should be in touch with Sapna flores to set up services.      Community Resources:    Equipment currently used at home:    Supplies currently used at home:      Employment/Financial:  Employment Status:          Financial Concerns:   BC/BS Medicare Advantage        Lifestyle & Psychosocial Needs:  Social Determinants of Health     Tobacco Use: Not on file   Alcohol Use: Not on file   Financial Resource Strain: Not on file   Food Insecurity: Not on file   Transportation Needs: Not on file   Physical Activity: Not on file    Stress: Not on file   Social Connections: Not on file   Intimate Partner Violence: Not on file   Depression: Not at risk     PHQ-2 Score: 1   Housing Stability: Not on file       Functional Status:  Prior to admission patient needed assistance: Pt lives independently in home with adult daughter. Her daughter works from home.    Mental Health Status:      Known history of anxiety    Chemical Dependency Status:                Values/Beliefs:  Spiritual, Cultural Beliefs, Mandaeism Practices, Values that affect care:                 Additional Information:  Discharge plan is home with home care when medically ready to discharge  Family reports that they have no concerns caring for pt while she is covid +. In fact they feel it is better for her to be home in familiar environment.  CTRN setting up home care services with daughterSapna.    Naomi Melgar, [facility]  Care Transitions  [phone]

## 2022-11-05 ENCOUNTER — APPOINTMENT (OUTPATIENT)
Dept: CARDIOLOGY | Facility: CLINIC | Age: 82
DRG: 023 | End: 2022-11-05
Attending: INTERNAL MEDICINE
Payer: COMMERCIAL

## 2022-11-05 ENCOUNTER — APPOINTMENT (OUTPATIENT)
Dept: OCCUPATIONAL THERAPY | Facility: CLINIC | Age: 82
DRG: 023 | End: 2022-11-05
Payer: COMMERCIAL

## 2022-11-05 VITALS
SYSTOLIC BLOOD PRESSURE: 138 MMHG | HEART RATE: 69 BPM | OXYGEN SATURATION: 97 % | BODY MASS INDEX: 19.16 KG/M2 | TEMPERATURE: 98.2 F | RESPIRATION RATE: 16 BRPM | DIASTOLIC BLOOD PRESSURE: 85 MMHG | HEIGHT: 64 IN | WEIGHT: 112.21 LBS

## 2022-11-05 LAB
CREAT SERPL-MCNC: 0.68 MG/DL (ref 0.52–1.04)
GFR SERPL CREATININE-BSD FRML MDRD: 86 ML/MIN/1.73M2
PLATELET # BLD AUTO: 304 10E3/UL (ref 150–450)

## 2022-11-05 PROCEDURE — 999N000128 HC STATISTIC PERIPHERAL IV START W/O US GUIDANCE

## 2022-11-05 PROCEDURE — 93270 REMOTE 30 DAY ECG REV/REPORT: CPT

## 2022-11-05 PROCEDURE — 93272 ECG/REVIEW INTERPRET ONLY: CPT | Performed by: INTERNAL MEDICINE

## 2022-11-05 PROCEDURE — 36415 COLL VENOUS BLD VENIPUNCTURE: CPT | Performed by: HOSPITALIST

## 2022-11-05 PROCEDURE — 97535 SELF CARE MNGMENT TRAINING: CPT | Mod: GO

## 2022-11-05 PROCEDURE — 250N000013 HC RX MED GY IP 250 OP 250 PS 637: Performed by: HOSPITALIST

## 2022-11-05 PROCEDURE — 82565 ASSAY OF CREATININE: CPT | Performed by: HOSPITALIST

## 2022-11-05 PROCEDURE — 99239 HOSP IP/OBS DSCHRG MGMT >30: CPT | Performed by: INTERNAL MEDICINE

## 2022-11-05 PROCEDURE — 85049 AUTOMATED PLATELET COUNT: CPT | Performed by: HOSPITALIST

## 2022-11-05 RX ORDER — FAMOTIDINE 20 MG/1
20 TABLET, FILM COATED ORAL 2 TIMES DAILY
Status: DISCONTINUED | OUTPATIENT
Start: 2022-11-05 | End: 2022-11-05 | Stop reason: HOSPADM

## 2022-11-05 RX ORDER — ATORVASTATIN CALCIUM 20 MG/1
20 TABLET, FILM COATED ORAL EVERY EVENING
Qty: 30 TABLET | Refills: 0 | Status: SHIPPED | OUTPATIENT
Start: 2022-11-05

## 2022-11-05 RX ORDER — FAMOTIDINE 20 MG/1
20 TABLET, FILM COATED ORAL 2 TIMES DAILY
Qty: 60 TABLET | Refills: 0 | Status: SHIPPED | OUTPATIENT
Start: 2022-11-05

## 2022-11-05 RX ORDER — METOPROLOL TARTRATE 25 MG/1
25 TABLET, FILM COATED ORAL 2 TIMES DAILY
Qty: 60 TABLET | Refills: 0 | Status: SHIPPED | OUTPATIENT
Start: 2022-11-05 | End: 2022-12-30 | Stop reason: DRUGHIGH

## 2022-11-05 RX ORDER — METOPROLOL SUCCINATE 50 MG/1
50 TABLET, EXTENDED RELEASE ORAL DAILY
Qty: 30 TABLET | Refills: 0 | Status: SHIPPED | OUTPATIENT
Start: 2022-11-05 | End: 2022-11-05

## 2022-11-05 RX ORDER — CLOPIDOGREL BISULFATE 75 MG/1
75 TABLET ORAL DAILY
Qty: 30 TABLET | Refills: 1 | Status: SHIPPED | OUTPATIENT
Start: 2022-11-05 | End: 2023-04-12

## 2022-11-05 RX ADMIN — SERTRALINE HYDROCHLORIDE 25 MG: 25 TABLET ORAL at 10:25

## 2022-11-05 RX ADMIN — CLOPIDOGREL BISULFATE 75 MG: 75 TABLET ORAL at 10:25

## 2022-11-05 RX ADMIN — POLYETHYLENE GLYCOL 400 AND PROPYLENE GLYCOL 1 DROP: 4; 3 SOLUTION/ DROPS OPHTHALMIC at 10:25

## 2022-11-05 RX ADMIN — FAMOTIDINE 20 MG: 20 TABLET ORAL at 12:08

## 2022-11-05 RX ADMIN — METOPROLOL TARTRATE 25 MG: 25 TABLET, FILM COATED ORAL at 10:25

## 2022-11-05 ASSESSMENT — ACTIVITIES OF DAILY LIVING (ADL)
ADLS_ACUITY_SCORE: 39
ADLS_ACUITY_SCORE: 32
ADLS_ACUITY_SCORE: 39
ADLS_ACUITY_SCORE: 39

## 2022-11-05 NOTE — PLAN OF CARE
Reason for Admission: L MCA CVA, COVID +  Cognitive/Mentation: Alert, unable to assess orientation.   Neuros/CMS: Stable with severe global aphasia. Very inconsistent with following commands.  VS: VSS on RA, SBP <180.   Tele: NSR  GI: BS audible, + flatus, no BM this shift. Continent.  : Voiding adequately. Continent.  Pulmonary: LS clear  Pain: Denies, can be difficult to assess- no nonverbal signs.     Drains/Lines: PIV SL  Skin: Intact with scattered bruising. R groin site. R forearm scab.   Activity: Up with SBA and Gb  Diet: Soft and bite sized diet with thin liquids. Takes pills whole.   Therapies recs: Home   Discharge: Pending, possibly home today with daughter.   Aggression Stoplight Tool: Green  End of shift summary: No changes this shift. Pt will discharge with cardiac monitor. Does not call appropriately, but easily redirectable. Covid precautions maintained.

## 2022-11-05 NOTE — PROGRESS NOTES
"   11/04/22 1600   Appointment Info   Signing Clinician's Name / Credentials (PT) Thelma Squires, PT, DPT       Present no   Language English   Living Environment   People in Home child(mel), adult   Living Environment Comments Unable to clarify 2/2 aphasia   Self-Care   Usual Activity Tolerance good   Current Activity Tolerance moderate   Fall history within last six months   (Unknown, pt unable to state)   General Information   Onset of Illness/Injury or Date of Surgery 11/01/22   Referring Physician Jeet Arango MD   Patient/Family Therapy Goals Statement (PT) Unable to state   Pertinent History of Current Problem (include personal factors and/or comorbidities that impact the POC) Roxy \"Lynsey\" SARAH Castellano is an 83 yo F with pertinent past medical history of recent COVID-19 infection taking Paxlovid, HTN, former tobacco use disorder, on PTA estradiol cream, Lipitor 10 mg daily.     She presented to the ED 11/1/22 with speech changes. /81. CT/CTA with L MCA early ischemic changes and CTA with L M2 occlusion. CTP with large mismatch volume. S/p EVT TICI 0. MRI with L MCA ischemic infarcts.   Existing Precautions/Restrictions fall   Cognition   Cognitive Status Comments Communication difficult 2/2 expressive and receptive aphasia. With demonstration, pt able to follow 1-2 step commands.   Pain Assessment   Patient Currently in Pain No   Integumentary/Edema   Integumentary/Edema no deficits were identifed   Posture    Posture Forward head position;Protracted shoulders   Range of Motion (ROM)   Range of Motion ROM is WFL   Strength (Manual Muscle Testing)   Strength (Manual Muscle Testing) strength is WFL   Bed Mobility   Comment, (Bed Mobility) SBA   Transfers   Comment, (Transfers) SBA no AD   Gait/Stairs (Locomotion)   Comment, (Gait/Stairs) SBA w/no AD - intermittently CGA 2/2 aphasia and pt confusion at instructions, turning around not understanding directions. No overt LOB "   Balance   Balance Comments Scored 43/56 on BBS, generally this score indicates pt is at increased falls risk. Anticipate pt may have scored lower 2/2 severe aphasia.   Sensory Examination   Sensory Perception Comments not able to formally assess, appeared WFL w/fn mob   Coordination   Coordination no deficits were identified   Muscle Tone   Muscle Tone no deficits were identified   Clinical Impression   Criteria for Skilled Therapeutic Intervention Yes, treatment indicated   PT Diagnosis (PT) Impaired indep w/fn mob   Influenced by the following impairments Balance, cognition, safety awareness   Functional limitations due to impairments Indep w/transfers and amb   Clinical Presentation (PT Evaluation Complexity) Stable/Uncomplicated   Clinical Presentation Rationale Few affecting comorbidities, assessment incl strength, balance, fn mob.   Clinical Decision Making (Complexity) low complexity   Planned Therapy Interventions (PT) balance training;gait training;transfer training   PT Total Evaluation Time   PT Eval, Low Complexity Minutes (55804) 10   Physical Therapy Goals   PT Frequency 5x/week   PT Predicted Duration/Target Date for Goal Attainment 11/08/22   PT Goals Transfers;Gait;PT Goal 1   PT: Transfers Modified independent   PT: Gait Modified independent   PT: Goal 1 Pt will improve BBS to at least 48/56 in order to reduce falls risk.   Neuromuscular Re-education   Neuromuscular Re-Education Minutes (53142) 28   Symptoms Noted During/After Treatment fatigue   Treatment Detail/Skilled Intervention Pt with no clear weakness or sensation impairments, vision appeared intact to general screen. Severe expressive and receptive aphasia. A modified [place] Balance completed with incr time to allow for demonstration and repetitions, writer attempting to reduce verbal complexity of item instructions. Pt watching closely. Grossly SBA with no overt LOB throughout assessment. Scored 43/56 on BBS, generally indicative of  increased falls risk and need for supervision/assistance. At least some portion of her performance difficulty was likely due to poor comprehension of test instructions rather than true balance difficulty.   PT Discharge Planning   PT Plan dynamic balance tasks w/incr time for modeling and repetition   PT Discharge Recommendation (DC Rec) home with assist;home with home care physical therapy   PT Rationale for DC Rec Pt presents with significant aphasia, no clear weakness. Grossly SBA with no overt LOB throughout assessment. Scored 43/56 on BBS, generally indicative of increased falls risk and need for supervision/assistance. At least some portion of her performance difficulty was likely due to poor comprehension of test instructions rather than true balance difficulty. Anticipate pt can d/c home with supervision and Home PT for safety assessment inside home, improve balance, reduce falls risk and risk of re-hospitalization.   PT Brief overview of current status SB/CG w/no AD   Total Session Time   Timed Code Treatment Minutes 28   Total Session Time (sum of timed and untimed services) 38

## 2022-11-05 NOTE — PROGRESS NOTES
Speech Language Therapy Discharge Summary    Reason for therapy discharge:    Discharged to home with home therapy.    Progress towards therapy goal(s). See goals on Care Plan in [facility] electronic health record for goal details.  Goals partially met.  Barriers to achieving goals:   discharge from facility.    Therapy recommendation(s):    Continued therapy is recommended.  Rationale/Recommendations:  recommend continued SLP.    Recommend IDDSI level 6 (soft and bite sized) with thin liquids by cup and straw.

## 2022-11-05 NOTE — PROGRESS NOTES
Discharge education provided to daughter and son with patient present. Family understands discharge instructions, follow up appointments, medication regimen, and recommended diet for pt. Home health care set up. Medications given to son to take with. Family understands the signs of a stroke and how to recognize/when to call. Questions answered. Discharge packet and stroke education packet given to family to take home.

## 2022-11-05 NOTE — DISCHARGE INSTRUCTIONS
Your risk factors for stroke or TIA (transient ischemic attack):    Your Risk Factors Your Results Normal Ranges   High blood pressure BP Readings from Last 1 Encounters:   11/05/22 138/85    Less than 120/80   Cholesterol              Total Lab Results   Component Value Date    CHOL 164 11/01/2022      Less than 150    Triglycerides   Lab Results   Component Value Date    TRIG 162 11/01/2022    Less than 150   LDL Lab Results   Component Value Date    LDL 79 11/01/2022       Less than 70   HDL Lab Results   Component Value Date    HDL 53 11/01/2022            Greater than 40 (men)  Greater than 50 (women)   Diabetes Recent Labs   Lab 11/03/22  0546   *    Fasting blood glucose    Smoking/tobacco use  Quit smoking and tobacco   Overweight N/A Lose 1-2 pounds a week   Lack of exercise Work with therapies to improve your strength, balance, and endurance. Start with daily walking with family by your side for safety. 30 minutes moderate activity each day   Other risk factors include carotid (neck) artery disease, atrial fibrillation and stress. You may be on new medicine to treat high blood pressure, cholesterol, diabetes or atrial fibrillation.    Understanding Stroke Booklet given to patient. Please refer to booklet for further information.    Stroke warning signs and symptoms - CALL 911 right away for:  - Sudden numbness or weakness in the face, arm or leg (often on one side of the body).  - Sudden confusion or trouble understanding what is going on.  - Sudden blurred or decreased vision in one or both eyes.  - Sudden trouble speaking, loss of balance, dizziness or problems with coordination.  - Sudden, severe headache for no reason.  - Fainting or seizures.  - Symptoms may go away then come back suddenly.

## 2022-11-05 NOTE — DISCHARGE SUMMARY
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  No follow up labs or test are needed.  - Follow up with Neurology in about 4-6 weeks  - Follow up with your cardiac monitor           Hospital Course   Lynsey Castellano is a 81 yo F with PMH of HTN, HLD, depression, recent COVID infection, who was admitted on 11/1/2022 with acute aphasia. Found to have Acute left MCA M2 occlusion s/p mechanical thrombectomy with reduction of thrombus burden, monitored in the ICU afterwards. Brain MRI showed  Subacute ischemia posterior left insular cortex and mid left temporal lobe with petechial hemorrhage and Acute ischemia posterior left frontal to left parietal lobes along with small foci within the anterior frontal lobes bilaterally, left posterior parietal lobe along with left thalamus and mild diffuse age related changes. ECHO was done on 11/02/2022 and shows ef of 60-65%, no regional wall motion changes and RV is normal in structure, function and size and mild (1+) mitral regurgitation and no pericardial effusion.   Patient was managed by Neuro stroke this admission. Started on Plavix, not started on aspirin due to ASA allergy.   Patient continues to have aphasia, word salad at discharge. She was recommended ARU.  discussed with family and family opted to have patient return home with home care and close monitoring of patient.   - LDL is 79 with goal of 40-70 and increased lipitor 20 mg daily  - Event monitor 30 days  - Patient has a reported but unknown ASA allergy, family  to discuss starting aspirin in the outpatient setting with PCP and/or Neurology    Recent covid 19 infection  She was on paxlovid at home and she is on room air and per infection control tested on 10/28 and last day is 11/9/2022. CRP and ferritin checked and were normal. 10 day quarantine ends 11/6.    Consultations This Hospital Stay   PATIENT [facility] IP CONSULT  NEUROLOGY IP STROKE CONSULT  SPEECH LANGUAGE PATH ADULT IP CONSULT  PHARMACY IP CONSULT  PHARMACY IP CONSULT  PHARMACY IP CONSULT  PHYSICAL THERAPY ADULT IP CONSULT  OCCUPATIONAL THERAPY ADULT IP CONSULT  REHAB ADMISSIONS LIAISON IP CONSULT  CARE MANAGEMENT / SOCIAL WORK IP CONSULT  INFECTION PREVENTION IP CONSULT  VASCULAR ACCESS ADULT IP CONSULT  SMOKING CESSATION PROGRAM IP CONSULT    Code Status   Full Code    Time Spent on this Encounter   I, Erasto Dye, personally saw the patient today and spent approximately 35 minutes discharging this patient. This included discussing discharge in detail with daughter Sirisha over the phone.       Erasto Dye MD  [facility]  ______________________________________________________________________    Physical Exam   Vital Signs: Temp: 98.2  F (36.8  C) Temp src: Oral BP: 138/85 Pulse: 69   Resp: 16 SpO2: 97 % O2 Device: None (Room air)    Weight: 112 lbs 3.43 oz    Constitutional: Thin female in NAD  Eyes: Nonicteric, normal ocular movements  HEENT: Normocephalic, atraumatic, oral mucosa moist  Respiratory: CTAB, no wheezing or crackles  Cardiovascular: RRR, normal S1/2, no m/r/g  GI: Nontender, nondistended  Skin: No rashes  Musculoskeletal: Normal strength in UE and LE, moves all extremities  Neurologic: Expressive and receptive aphasia difficult to ascertain orientation status  Psychiatric: Appropriate affect and mood       Primary Care Physician   Bridget Lopez    Discharge Disposition   Discharged to home  Condition at discharge: Stable    Significant Results and  Procedures   Most Recent 3 CBC's:  Recent Labs   Lab Test 11/03/22  0546 11/02/22  0457 11/01/22  1041   WBC 6.9 7.1 6.5   HGB 11.8 11.7 14.4   MCV 90 92 95    207 280     Most Recent 3 BMP's:  Recent Labs   Lab Test 11/03/22  0546 11/02/22  2122 11/02/22  0752 11/02/22  0457 11/01/22  1449 11/01/22  1041     --   --  138  --  136   POTASSIUM 4.0  --   --  4.0  --  4.4   CHLORIDE 108  --   --  106  --  100   CO2 24  --   --  27  --  24   BUN 12  --   --  21  --  27   CR 0.58  --   --  0.72  --  0.94   ANIONGAP 6  --   --  5  --  12   JAMES 8.7  --   --  8.6  --  9.9   * 110* 86 87   < > 106*    < > = values in this interval not displayed.     Most Recent 2 LFT's:  Recent Labs   Lab Test 09/16/19  0947   AST 37   ALT 24   ALKPHOS 76   BILITOTAL 0.7     Most Recent 3 INR's:  Recent Labs   Lab Test 11/02/22  0457 11/01/22  1041   INR 1.12 1.01     Most Recent 3 Troponin's:No lab results found.  Most Recent 3 BNP's:No lab results found.  Most Recent D-dimer:No lab results found.  Most Recent Cholesterol Panel:  Recent Labs   Lab Test 11/01/22  1041   CHOL 164   LDL 79   HDL 53   TRIG 162*       Results for orders placed or performed during the hospital encounter of 11/01/22   CTA Head Neck w Contrast    Narrative    CT ANGIOGRAM OF THE HEAD AND NECK WITH CONTRAST November 1, 2022 10:55  AM     HISTORY: Code stroke.     TECHNIQUE: CT angiography with an injection of 75mL ISOVUE-370 IV with  scans through the head and neck. Images were transferred to a separate  3-D workstation where multiplanar reformations and 3-D images were  created. Estimates of carotid stenoses are made relative to the distal  internal carotid artery diameters except as noted. Radiation dose for  this scan was reduced using automated exposure control, adjustment of  the mA and/or kV according to patient size, or iterative  reconstruction technique.      COMPARISON: CT head of same day.    CT ANGIOGRAM HEAD FINDINGS: The  intracranial internal carotid arteries  are widely patent. There is occlusion of the dominant inferior  division M2 branch of the left middle cerebral artery (series 9 image  393). Asymmetrically diminutive caliber of the distal inferior  division left MCA branches. The bilateral anterior cerebral arteries  are patent. The major proximal branches of the right middle cerebral  arteries are patent. The bilateral vertebral arteries, basilar artery,  and the proximal aspects of the posterior cerebral arteries appear  patent. No intracranial aneurysm or high flow vascular malformation is  identified.    CT ANGIOGRAM NECK FINDINGS: The origin of the brachycephalic artery is  not included in the field-of-view. The origins of the left common  carotid artery and left subclavian artery are patent. Minimal  atherosclerosis in the visualized aortic arch.    Right carotid artery: The right common and internal carotid arteries  are patent. Mild atherosclerotic disease at the carotid bifurcation  and proximal internal carotid artery without significant stenosis by  NASCET criteria.     Left carotid artery: The left common and internal carotid arteries are  patent. Mild atherosclerotic disease at the carotid bifurcation and  proximal internal carotid artery without significant stenosis by  NASCET criteria. Tortuous internal carotid artery.    Vertebral arteries: Vertebral arteries are patent without evidence of  dissection. No significant stenosis.     Other findings: There appear to be mild emphysematous changes in the  visualized upper lung zones bilaterally. ACDF changes at C6-C7.  Multilevel posterior decompression changes in the mid to lower  cervical region with posterior fusion instrumentation spanning C5-T1.  Probable chronic mild concavity of the T3 superior endplate.  Multilevel degenerative changes in the cervical and thoracic spine.      Impression    IMPRESSION:    CTA HEAD:  1. Occlusion of the dominant inferior  division M2 branch of the left  middle cerebral artery.  2. No other high-grade stenosis or large vessel occlusion identified.    CTA NECK:  1. Mild bilateral carotid bifurcation atherosclerosis without  significant stenosis.  2. Patent cervical vertebral arteries.    Findings discussed by myself by phone with Dr. Henry at 11:13 AM on  11/1/2022.     JORY STACK MD         SYSTEM ID:  [id number]   CT Head w/o Contrast    Narrative    CT SCAN OF THE HEAD WITHOUT CONTRAST November 1, 2022 10:54 AM     HISTORY: Code stroke. Aphasia.    TECHNIQUE: Axial images of the head and coronal reformations without  IV contrast material. Radiation dose for this scan was reduced using  automated exposure control, adjustment of the mA and/or kV according  to patient size, or iterative reconstruction technique.    COMPARISON: None.    FINDINGS: Subtle asymmetric hypoattenuation of the posterior aspect of  the left insular cortex and the left superior temporal gyrus,  concerning for acute ischemia/infarct. No other definite CT findings  of acute infarct. No acute intracranial hemorrhage or mass effect. The  ventricles are normal in size, shape and configuration. Mild to  moderate diffuse parenchymal volume loss. Mild to moderate scattered  cerebral white matter hypodensities which are nonspecific, but likely  related to chronic microvascular ischemic disease.     Bilateral lens implants. The bony calvarium and bones of the skull  base appear intact. Bilateral temporomandibular joint degenerative  changes, left greater than right. The paranasal sinuses and mastoids  are relatively clear.      Impression    IMPRESSION:     1. Subtle asymmetric hypoattenuation in the posterior left insular  cortex and the left superior temporal gyrus, concerning for acute  ischemia/infarct (ASPECT score = 8). No evidence of acute intracranial  hemorrhage, mass, or herniation.  2. Generalized brain parenchymal volume loss and white matter  changes  likely due to chronic microvascular ischemic disease.      JORY STACK MD         SYSTEM ID:  [id number]   CT Head Perfusion w Contrast    Narrative    CT BRAIN PERFUSION 11/1/2022 11:18 AM    HISTORY: Code stroke, left MCA occlusion.    TECHNIQUE: Time sequential axial CT images of the head were acquired  during the administration of 50mL Isovue-370 IV. Color perfusion maps  of the brain were created from this time sequential axial source data.      Radiation dose for this scan was reduced using automated exposure  control, adjustment of the mA and/or kV according to patient size, or  iterative reconstruction technique.    COMPARISON: CTA head and neck of same day.      Impression    IMPRESSION: Findings consistent with a small core infarct in the left  middle artery territory centered in the lateral left temporal region,  with at least moderate surrounding presumed ischemic penumbra. Per the  RAPID perfusion maps, the volume of the core infarct (CBF less than  30%) = 7 mL, and the volume of the presumed ischemic penumbra = 48 mL,  with mismatch ratio 7.9.    Findings were discussed with Dr. Henry by myself by phone at  approximately 11:13 AM on 11/1/2022.    JORY STACK MD         SYSTEM ID:  [id number]   MR Brain w/o Contrast    Narrative    EXAM: MR BRAIN W/O CONTRAST  LOCATION: [facility]  DATE/TIME: 11/1/2022 11:49 PM    INDICATION: Follow-up ischemic stroke.  COMPARISON: 11/01/2022.  TECHNIQUE: MRI the brain without and with without contrast.    FINDINGS: On the diffusion-weighted images there are multiple foci of ischemia involving the posterior left insular cortex, left temporal lobe to the posterior left frontal to left parietal lobes. There are also a few tiny foci of ischemia involving the   anterior frontal lobes bilaterally, anterior thalamus and the posterior left temporal to parietal lobes.     The ischemia involving the posterior left insular cortex to the  left temporal lobe are visualized on the FLAIR and T2-weighted images but the smaller foci of ischemia involving the posterior left frontal to left parietal lobes and the tiny foci in other   vascular distribution are not discretely visualized. This is suggestive of an acute on subacute ischemia. On the susceptibility weighted images there is petechial hemorrhage in the left insular cortex region region. There are appropriate flow voids   within the cavernous portions of the internal carotid arteries and the basilar artery. On the FLAIR and T2-weighted images there are scattered foci of high signal within the periventricular and subcortical white matter consistent with mild diffuse small   vessel ischemic disease. The ventricular system, basal cisterns and the cortical sulci are consistent with mild diffuse volume loss.    There is no evidence of cerebellar tonsillar ectopia. The corpus callosum and the sella region have appropriate configuration and signal intensity for the patient's age. The orbit regions are unremarkable. There is no significant paranasal sinus disease.   The mastoid air cells and the middle ear regions are clear.      Impression    IMPRESSION:  1. Subacute ischemia posterior left insular cortex and mid left temporal lobe with petechial hemorrhage.  2. Acute ischemia posterior left frontal to left parietal lobes along with small foci within the anterior frontal lobes bilaterally, left posterior parietal lobe along with left thalamus.  3. Mild diffuse age related changes.    These findings were communicated by phone to Dr. De Oliveira at 12:13 AM on 11/02/2022.   CT Head w/o Contrast    Narrative    EXAM: CT HEAD W/O CONTRAST  LOCATION: [facility]  DATE/TIME: 11/1/2022 6:13 PM    INDICATION: **dual energy** left M2 occlusion infarct s p mechanical thrombectomy  COMPARISON: CT angiogram 11/01/2022  TECHNIQUE: Routine CT Head without IV contrast. Multiplanar reformats. Dose  reduction techniques were used.    FINDINGS:  INTRACRANIAL CONTENTS: No intracranial hemorrhage, extraaxial collection, or mass effect. Evolving acute infarct left insular region. Mild iodine staining the infarcted region. Tiny focus of air in the region likely postprocedure. Mild presumed chronic   small vessel ischemic changes. Mild generalized volume loss. No hydrocephalus.     VISUALIZED ORBITS/SINUSES/MASTOIDS: No intraorbital abnormality. No paranasal sinus mucosal disease. No middle ear or mastoid effusion.    BONES/SOFT TISSUES: No acute abnormality.      Impression    IMPRESSION:  1.  Mild iodine staining in the infarcted left insular region.  2.  No intracranial hemorrhage.   Echocardiogram Complete - For age > 60 yrs     Value    LVEF  60-65%    Narrative    [id number]  [id number]  [id number]  [id number]^VAN^SAMIR^THEODORA     [facility]  Echocardiography Laboratory  [address] [place] [address]     Name: JW SMITH  MRN: [id number]  : 1940  Study Date: 2022 10:26 AM  Age: 82 yrs  Gender: Female  Patient Location: [facility]  Reason For Study: Cerebrovascular Incident  Ordering Physician: SAMIR ARAUJO  Referring Physician: Bridget Lopez  Performed By: Jayce Garcia RDCS     BSA: 1.5 m2  Height: 64 in  Weight: 113 lb  HR: 65  BP: 136/70 mmHg  ______________________________________________________________________________  Procedure  Complete Portable Echo Adult.  ______________________________________________________________________________  Interpretation Summary     Left ventricular systolic function is normal. The visual ejection fraction is  60-65%. No regional wall motion abnormalities noted.  The right ventricle is normal in structure, function and size.  The aortic valve is trileaflet with aortic valve sclerosis. There is trace  aortic regurgitation.  There is mild (1+) mitral regurgitation.  There is no pericardial effusion.     There are no prior studies  available for comparison.  ______________________________________________________________________________  Left Ventricle  The left ventricle is normal in size. There is normal left ventricular wall  thickness. Left ventricular systolic function is normal. The visual ejection  fraction is 60-65%. Left ventricular diastolic function is normal. No regional  wall motion abnormalities noted.     Right Ventricle  The right ventricle is normal in structure, function and size.     Atria  Normal left atrial size. Right atrial size is normal. No clear evidence of  interatrial shunt on limited assessment with color Doppler.     Mitral Valve  There is mild (1+) mitral regurgitation.     Tricuspid Valve  There is trace to mild tricuspid regurgitation. The right ventricular systolic  pressure is approximated at 29.1 mmHg plus the right atrial pressure.     Aortic Valve  The aortic valve is trileaflet with aortic valve sclerosis. There is trace  aortic regurgitation.     Pulmonic Valve  The pulmonic valve is not well seen, but is grossly normal.     Vessels  The aortic root is normal size. Normal size ascending aorta. The inferior vena  cava was normal in size with preserved respiratory variability.     Pericardium  There is no pericardial effusion.     ______________________________________________________________________________  MMode/2D Measurements & Calculations  IVSd: 1.0 cm  LVIDd: 3.5 cm  LVIDs: 2.3 cm  LVPWd: 0.78 cm  FS: 35.4 %  LV mass(C)d: 91.0 grams  LV mass(C)dI: 59.3 grams/m2     Ao root diam: 3.1 cm  LA dimension: 3.8 cm  LA/Ao: 1.2  LA Volume (BP): 50.6 ml  LA Volume Index (BP): 33.1 ml/m2  RWT: 0.44     Doppler Measurements & Calculations  MV E max jann: 139.0 cm/sec  MV A max jann: 46.6 cm/sec  MV E/A: 3.0  MV dec slope: 645.9 cm/sec2  MV dec time: 0.21 sec  PA acc time: 0.10 sec     TR max jann: 269.5 cm/sec  TR max P.1 mmHg  E/E' av.3  Lateral E/e': 13.7  Medial E/e': 18.8      ______________________________________________________________________________  Report approved by: Andreas Hooks 11/02/2022 02:17 PM             Discharge Orders      Home Care Referral      Reason for your hospital stay    You were hospitalized for a stroke and also had COVID-19. You are improving and will need ongoing therapy to recover from your stroke.     Follow-up and recommended labs and tests     Follow up with primary care provider, Bridget Lopez, within 7 days for hospital follow- up.  No follow up labs or test are needed.  - Follow up with Neurology in about 4-6 weeks  - Follow up with your cardiac monitor     Activity    Your activity upon discharge: activity as tolerated at home. Closely monitor Lynsey for home safety.     Cardiac Event Monitor Adult/Pediatric     Diet    Follow this diet upon discharge:       Combination Diet Soft and Bite Sized Diet (level 6); Thin Liquids (level 0); No Straws     Stroke Hospital Follow Up    [facility] will call you to coordinate care as prescribed by your provider. If you don t hear from a representative within 2 business days, please call [phone].       Discharge Medications   Current Discharge Medication List      START taking these medications    Details   clopidogrel (PLAVIX) 75 MG tablet Take 1 tablet (75 mg) by mouth daily  Qty: 30 tablet, Refills: 1    Associated Diagnoses: Acute CVA (cerebrovascular accident) (H)      famotidine (PEPCID) 20 MG tablet Take 1 tablet (20 mg) by mouth 2 times daily  Qty: 60 tablet, Refills: 0    Associated Diagnoses: Gastroesophageal reflux disease, unspecified whether esophagitis present      metoprolol tartrate (LOPRESSOR) 25 MG tablet Take 1 tablet (25 mg) by mouth 2 times daily  Qty: 60 tablet, Refills: 0    Associated Diagnoses: Acute CVA (cerebrovascular accident) (H)         CONTINUE these medications which have CHANGED    Details   atorvastatin (LIPITOR) 20 MG tablet Take 1 tablet (20 mg) by mouth every  evening  Qty: 30 tablet, Refills: 0    Associated Diagnoses: Acute CVA (cerebrovascular accident) (H)         CONTINUE these medications which have NOT CHANGED    Details   albuterol (PROAIR HFA/PROVENTIL HFA/VENTOLIN HFA) 108 (90 Base) MCG/ACT inhaler Inhale 1-2 puffs into the lungs every 4 hours as needed    Comments: Pharmacy may dispense brand covered by insurance (Proair, or proventil or ventolin or generic albuterol inhaler)      bimatoprost (LATISSE) 0.03 % external opthalmic solution Apply 1 drop topically nightly as needed (apply to eye lashes)      calcium citrate-vitamin D (CITRACAL) 315-200 MG-UNIT TABS per tablet Take 2 tablets by mouth 2 times daily      cetirizine (ZYRTEC) 10 MG tablet Take 10 mg by mouth daily as needed      clobetasol (TEMOVATE) 0.05 % external ointment Apply topically 2 times daily as needed (rash)      coenzyme Q-10 200 MG CAPS Take 200 mg by mouth daily      cycloSPORINE (RESTASIS) 0.05 % ophthalmic emulsion Apply 1 drop to eye 2 times daily as needed for dry eyes      estradiol (ESTRACE) 0.1 MG/GM vaginal cream Place 2 g vaginally twice a week Once or twice weekly      hydrocortisone (CORTAID) 1 % external cream Apply topically 2 times daily as needed for rash      ipratropium (ATROVENT) 0.03 % nasal spray Spray 2 sprays in nostril daily as needed      ketoconazole (NIZORAL) 2 % external cream Apply topically 2 times daily as needed for itching      multivitamin (CENTRUM SILVER) tablet Take 1 tablet by mouth daily      polyethylene glycol-propylene glycol (SYSTANE ULTRA) 0.4-0.3 % SOLN ophthalmic solution Place 1 drop into both eyes every hour as needed for dry eyes      sertraline (ZOLOFT) 25 MG tablet Take 25 mg by mouth daily         STOP taking these medications       azithromycin (ZITHROMAX) 250 MG tablet Comments:   Reason for Stopping:         metoprolol succinate ER (TOPROL-XL) 50 MG 24 hr tablet Comments:   Reason for Stopping:         nirmatrelvir and ritonavir  (PAXLOVID) therapy pack Comments:   Reason for Stopping:             Allergies   Allergies   Allergen Reactions     Codeine Nausea and Vomiting     Hydrocodone-Acetaminophen Nausea     Unknown on which vicodin        Levofloxacin Muscle Pain (Myalgia) and Other (See Comments)     Joint pain  Joint pain  Shoulder pain       Sulfa Drugs Itching     Aspirin Unknown     Levothyroxine      Other reaction(s): Arthralgia     Methotrexate      Other reaction(s): *Unknown - Pt Doesn't Remember  Patient and family do not recall this allergy       Tetanus-Diphtheria Toxoids      Other reaction(s): Edema  Horse serum tetanus.       Tetracyclines Unknown

## 2022-11-05 NOTE — CONSULTS
Care Management Discharge Note    Discharge Date: 11/05/2022       Discharge Disposition: Home Care, Home RN/PT/OT through [facility]    Discharge Services:  homecare     Discharge DME:  n/a    Discharge Transportation:  Family    Private pay costs discussed: Not applicable    PAS Confirmation Code:  n/a  Patient/family educated on Medicare website which has current facility and service quality ratings:  no    Education Provided on the Discharge Plan:  yes  Persons Notified of Discharge Plans: Charge RN, Patient's Daughter Sirisha by phone [facility] Liaison Karen  Patient/Family in Agreement with the Plan:  yes    Handoff Referral Completed: Yes    Additional Information:  Writer received a call from the Charge RN on NS that the patient has been cleared to discharge to home with family and homecare.  Writer received an email from [facility] that they are able to take the patient for homecare at discharge.  Writer entered the information and phone number for [facility] on the discharge orders.  Writer connected with patient's Daughter Sirisha by phone, she is aware of the homecare orders and confirmed that the address in [facility] is correct. She asked that her sister Sapna is contacted by [facility] for assessment and scheduling confirmed that the phone number in Epic is correct as listed and notified [facility] Liaison.  No further needs identified.            Aleena Millan RN, BSN, ACM   Care Transitions Specialist   [facility]  Care Transitions Specialist   [address] [place] [address]  [email or web address]  Office:[phone] Fax: [phone]  [facility]

## 2022-11-05 NOTE — PROGRESS NOTES
11/04/22 1400   Signing Clinician's Name / Credentials   Signing clinician's name / credentials Thelma Squires PT, DPT   Aguilera Balance Scale (DEON DILLON, DONNIE RODRIGUEZ, LENNY TIAN, MARYAM CAMILO: MEASURING BALANCE IN THE ELDERLY: VALIDATION OF AN INSTRUMENT. CAN. J. PUB. HEALTH, JULY/AUGUST SUPPLEMENT 2:S7-11, 1992.)   Sit To Stand 3   Standing Unsupported 4   Sitting Unsupported 4   Stand to Sit 3   Transfers 3   Standing with Eyes Closed 3   Standing Unsupported, Feet Together 3   Reach Forward With Outstretched Arm 3   Retrieve Object From Floor 3   Turning to Look Behind 4   Turn 360 Degrees 2   Placing Alternate Foot on Stool (4-6 inches) 3   Unsupported Tandem Stand (Demonstrate to Subject) 3   One Leg Stand 2   Total Score (A score of 45 or less has been correlated with an increased risk of falls)   Total Score (out of 56) 43     Aguilera Balance Scale (BBS)  Patient Score: 43/56  Aguilera Balance Scale (BBS) Cutoff Scores: A score of ? 45/56 indicates an increased risk for falls, or  A score of <51/56 indicates an increased risk for falls in persons with history of falls. A score of <42/56 indicates an increased risk for falls in persons without history of falls. Please note that a score of <40/56 is nearly 100% predictive of a fall occuring in the future.     Score Interpretation/Recommendation: Pt would benefit from supervision at discharge to reduce risk of falling.      The BBS is a measure of static and dynamic standing balance that has been validated in community dwelling elderly individuals and individuals who have Parkinson's Disease, MS, and those who are s/p CVA and TBI. The test is administered without an assistive device. Scores from the Aguilera are used to determine the probability of falling based on the patient's previous history of falls and their test performance.     Thelma Squires PT, DPT, 11/4/2022

## 2022-11-06 NOTE — PLAN OF CARE
Occupational Therapy Discharge Summary    Reason for therapy discharge:    Discharged to home with home therapy.    Progress towards therapy goal(s). See goals on Care Plan in [facility] electronic health record for goal details.  Goals partially met.  Barriers to achieving goals:   discharge from facility.    Therapy recommendation(s):    Continued therapy is recommended.  Rationale/Recommendations:  to increase indep with functional mobility and self cares. Address aphasia and community reintegration. .

## 2022-11-07 ENCOUNTER — PATIENT OUTREACH (OUTPATIENT)
Dept: CARE COORDINATION | Facility: CLINIC | Age: 82
End: 2022-11-07

## 2022-11-07 ENCOUNTER — HOSPITAL ENCOUNTER (OUTPATIENT)
Dept: EDUCATION SERVICES | Facility: CLINIC | Age: 82
Discharge: HOME | End: 2022-11-07
Attending: HOSPITALIST
Payer: COMMERCIAL

## 2022-11-07 ENCOUNTER — TELEPHONE (OUTPATIENT)
Dept: NEUROLOGY | Facility: CLINIC | Age: 82
End: 2022-11-07

## 2022-11-07 NOTE — CONSULTS
Stroke Education Note    The following information has been reviewed with the patient and family over the phone: (daughter, Sirisha)    1. Warning signs of stroke    2. Calling 911 if having warning signs of stroke    3. All modifiable risk factors: hypertension, CAD, atrial fib, diabetes, hypercholesterolemia, smoking, substance abuse, diet, physical inactivity, obesity, sleep apnea.    4. Patient's risk factors for stroke which include: HTN, HLD    5. Follow-up plan for after discharge    6. Discharge medications which include: Plavix, Lipitor, metoprolol    In addition, the above information was given to the patient and family in writing as a part of the [facility] Stroke Class Handout.    Learner's response to risk factors / lifestyle modification education: Committment to change     Ciarra Gomez RN

## 2022-11-07 NOTE — PROGRESS NOTES
[facility]    Background: Transitional Care Management program identified per system criteria and reviewed by [facility] team for possible outreach.    Assessment: Upon chart review, CCR Team member will not proceed with patient outreach related to this episode of Transitional Care Management program due to reason below:    Patient has active communication with a nurse, provider or care team for reason of post-hospital follow up plan.  Outreach call by CCR team not indicated to minimize duplicative efforts.     Plan: Transitional Care Management episode addressed appropriately per reason noted above.      Daniela Carroll RN  [facility] Resource [facility]    *Connected Care Resource Team does NOT follow patient ongoing. Referrals are identified based on internal discharge reports and the outreach is to ensure patient has an understanding of their discharge instructions.

## 2022-12-13 NOTE — PROGRESS NOTES
__________________________________________________________    ___________________________________  ESTABLISHED PATIENT NEUROLOGY NOTE    DATE OF VISIT: 12/14/2022  MRN: [id number]  PATIENT NAME: Neena Castellano  YOB: 1940      Chief Complaint: Patient presents with:  Stroke: Hospital follow up/ many questions      SUBJECTIVE:                                                      HISTORY OF PRESENT ILLNESS:  History of Present Illness: Neena Castellano is a 82 year old female presenting for follow-up for Acute and subacute L MCA territory infarcts in setting of L M2 occlusion suspected embolic stroke of undetermined source.     She was hospitalized at [facility] on 11/01/22 - 11/05/22. Prior to the hospital stay, she had a past medical history of recent COVID-19 infection taking Paxlovid, HTN, former tobacco use disorder, on PTA estradiol cream, Lipitor 10 mg daily,.    She presented to the hospital with speech changes. /81. CT/CTA with L MCA early ischemic changes and CTA with L M2 occlusion. CTP with large mismatch volume. S/p EVT TICI 0. MRI with L MCA ischemic infarcts.     Stroke Evaluation summarized:    MRI/Head CT MRI: subacute L insular cortex and mid L temporal lobe ischemic infarct with petechial hemorrhage, acute ischemic infarcts to L frontal and L parietal lobes with small foci anterior b/l frontal lobe infarcts, L posterior parietal lobe along with L thalamus  CTH repeat: no ICH, mild iodine staining in L insular region  CTH: subtle assymetric hypoattenuation in posterior L insular cortex and L superior temporal gyrus concerning for acute ischemia (ASPECT 8), no evidence of ICH   Intracranial Vasculature CTA head: occlusion dominant inferior L M2 division   Cervical Vasculature CTA neck: mild b/l carotid bifurcation atherosclerosis without significant stenosis      Echocardiogram EF 60-65%, no wma, LA mild dilation   EKG/Telemetry Sinus bradycardia with PACs  (nubia)   Other Testing IR carotid cerebral angiogram: clot burden decreased after two passes with solitaire stent retriever and imperative suction aspiration but residual distal thrombus, TICI 0      LDL  11/1/2022: 79 mg/dL   A1C  11/1/2022: 5.9 %   Troponin No lab value available in past 48 hrs   long term outpatient blood pressure goal <130/80 to be achieved slowly over the next several weeks if able to tolerate without worsening symptoms, recommend home monitoring twice daily in AM and PM, keep log and bring to f/u with PCP  -Plavix 75 mg daily indefinitely (ASA allergy)  -LDL 79, Lipitor 20 mg daily    Today 12/14/22   Neena arrives to the clinic for her poststroke follow-up.  Her daughter Rohan accompanies her to the appointment.  Neena has moderate aphasia noted during appointment.  Her daughter states that she woke up and her speech did not make sense.  She was brought to [facility] and had a stroke work-up.  She currently is aphasic.  No other residual effects from her stroke.  Denies difficulty with swallowing, visual changes, facial droop, unilateral weakness or numbness and tingling.  Physical therapy has signed off and approved Neena to work out independently.  She sees speech therapy once a week.  Her daughter states that she continues to have a difficult time following commands and has some aphasia noted.  She feels her symptoms have improved drastically since her stroke.  Her daughter notes that recently she started taking deeper breaths.  She will intermittently take a deep breath.  Neena states she notices this with increased anxiety.  Her primary care provider has increased her sertraline and referred her to psychiatry.    Secondary prevention includes antiplatelet therapy.  Rohan states that her primary care provider put her back on daily aspirin 81 mg.  She is currently taking Plavix 75 mg with aspirin 81 mg.  She denies an allergy to aspirin.  Neena eats healthy meals and exercises on a  regular basis.  She has 2 vascular risk factors:  hypertension and hyperlipidemia.  Her blood pressure is elevated at 151/88 today in clinic.  She states that her primary care provider is the process of adjusting her metoprolol.  LDL is 79 she is taking atorvastatin daily.  Tolerating all of her medications well.    Cardiac event monitor completed.  No evidence of atrial fibrillation.  Rohan is worried about findings: bradycardia.       Current Prevention Medications: Taking medication as prescribed.  In addition PCP started her on aspirin 81 mg    Atorvastatin 20 mg tablet  Plavix 75 mg tablet  Metoprolol 25 mg tablet    Perceived Side Effects: No    Medication Notes:   AED Medication Compliance:  compliant   Using a pill box:  Yes     Current Medications:   albuterol (PROAIR HFA/PROVENTIL HFA/VENTOLIN HFA) 108 (90 Base) MCG/ACT inhaler, Inhale 1-2 puffs into the lungs every 4 hours as needed  atorvastatin (LIPITOR) 20 MG tablet, Take 1 tablet (20 mg) by mouth every evening  bimatoprost (LATISSE) 0.03 % external opthalmic solution, Apply 1 drop topically nightly as needed (apply to eye lashes)  calcium citrate-vitamin D (CITRACAL) 315-200 MG-UNIT TABS per tablet, Take 2 tablets by mouth 2 times daily  cetirizine (ZYRTEC) 10 MG tablet, Take 10 mg by mouth daily as needed  clobetasol (TEMOVATE) 0.05 % external ointment, Apply topically 2 times daily as needed (rash)  clopidogrel (PLAVIX) 75 MG tablet, Take 1 tablet (75 mg) by mouth daily  coenzyme Q-10 200 MG CAPS, Take 200 mg by mouth daily  cycloSPORINE (RESTASIS) 0.05 % ophthalmic emulsion, Apply 1 drop to eye 2 times daily as needed for dry eyes  estradiol (ESTRACE) 0.1 MG/GM vaginal cream, Place 2 g vaginally twice a week Once or twice weekly  famotidine (PEPCID) 20 MG tablet, Take 1 tablet (20 mg) by mouth 2 times daily  hydrocortisone (CORTAID) 1 % external cream, Apply topically 2 times daily as needed for rash  ipratropium (ATROVENT) 0.03 % nasal spray,  Spray 2 sprays in nostril daily as needed  ketoconazole (NIZORAL) 2 % external cream, Apply topically 2 times daily as needed for itching  metoprolol tartrate (LOPRESSOR) 25 MG tablet, Take 1 tablet (25 mg) by mouth 2 times daily  multivitamin (CENTRUM SILVER) tablet, Take 1 tablet by mouth daily  polyethylene glycol-propylene glycol (SYSTANE ULTRA) 0.4-0.3 % SOLN ophthalmic solution, Place 1 drop into both eyes every hour as needed for dry eyes  sertraline (ZOLOFT) 25 MG tablet, Take 25 mg by mouth daily    No current facility-administered medications on file prior to visit.    Past Medical History:   Patient  has no past medical history on file.  Surgical History:  She  has a past surgical history that includes REPAIR OF HAMMERTOE,ONE (Right, 11/7/2014); Bunionectomy (Right, 11/7/2014); Hysterectomy (1993); Oophorectomy (1993); Cataract Extraction, Bilateral; Biopsy breast (Left); Colonoscopy (2012); and IR Carotid Cerebral Angiogram Bilateral (11/1/2022).  Family and Social History:  Reviewed, and she  reports that she quit smoking about 33 years ago. She has never used smokeless tobacco. She reports that she does not currently use alcohol.  Reviewed, and family history includes Ovarian Cancer (age of onset: 83.00) in her mother.    RECENT DIAGNOSTIC STUDIES:   Labs:    Coagulation studies:  Recent Labs   Lab Test 11/02/22  0457 11/01/22  1041   INR 1.12 1.01        Lipid panel:  Recent Labs   Lab Test 11/01/22  1041 07/08/16  1431   CHOL 164 211*   HDL 53 65   LDL 79 126   TRIG 162* 99       HbA1C:  Recent Labs   Lab Test 11/01/22  1041   A1C 5.9*       Troponin:  Recent Labs   Lab Test 11/01/22  1041   TROPONINIS 8     No lab results found.  No lab results found.    Imaging: See HPI     REVIEW OF SYSTEMS:                                                      10-point review of systems is negative except as mentioned above in HPI.    EXAM:                                                      Physical Exam:  "  Vitals: BP (!) 151/88   Pulse 57   Ht 1.626 m (5' 4\")   Wt 51 kg (112 lb 5.4 oz)   BMI 19.28 kg/m    BMI= Body mass index is 19.28 kg/m .  GENERAL: NAD.  HEENT: NC/AT.  PULM: Non-labored breathing.   Neurologic:  MENTAL STATUS: Alert, attentive. Speech is Aphasic. Limited comprehension. Normal concentration. Difficult to assess fund of knowledge with aphasia. Unable to verbalize different objects or repeat sentences.   CRANIAL NERVES: Visual fields intact to confrontation. Pupils equally, round and reactive to light. Facial sensation and movement normal. EOM full. [place]. Trapezius strength intact. Palate moves symmetrically. Tongue midline.  MOTOR: 5/5 in proximal and distal muscle groups of upper and lower extremities. Tone and bulk normal.   DTRs: Intact and symmetric in biceps, BR and patellae.   SENSATION: Normal light touch throughout.   COORDINATION: finger nose finger- difficult due to limited comprehension. Finger tapping normal. Knee heel shin normal.  STATION AND GAIT: Romberg Negative. Good postural reflexes. Casual gait and tandem Normal  right hand-dominant.      ASSESSMENT and PLAN:                                                      Assessment:    ICD-10-CM    1. Acute CVA (cerebrovascular accident) (H)  I63.9 Stroke Hospital Follow Up     Adult Cardiology [facility] Referral        Neena Castellano is a 82 year old female presenting for follow-up for Acute and subacute L MCA territory infarcts in setting of L M2 occlusion suspected embolic stroke of undetermined source. She was hospitalized at [facility] on 11/01/22 - 11/05/22. Prior to the hospital stay, she had a past medical history of recent COVID-19 infection taking Paxlovid, HTN, former tobacco use disorder, on PTA estradiol cream, Lipitor 10 mg daily. She presented to the hospital with speech changes. /81. CT/CTA with L MCA early ischemic changes and CTA with L M2 occlusion.  MRI with L MCA ischemic infarcts. "     Since being discharged Neena remains aphasic.  She sees speech therapy once a week.  Her daughter reports that she is drastically improved since her stroke.  She is cleared by other therapies.  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  Information provided.     Her primary care provider placed her back on aspirin. She is currently taking Plavix 75 mg with aspirin 81 mg.  She denies an allergy to aspirin.  Okay to stop Plavix after 90 days if tolerating aspirin as monotherapy.  Encouraged them to call the neurology clinic if daily aspirin becomes a problem.  At that point we can switch her back to Plavix daily.  She completed the cardiac event monitor.  No atrial fibrillation noted.  She did have episodes of bradycardia.  Her daughter would like to speak to her cardiologist about this.  Referral sent.  Neena eats healthy meals and exercises on a regular basis. Her blood pressure is elevated at 151/88 today in clinic.  She states that her primary care provider is the process of adjusting her metoprolol.  I encouraged her to create a blood pressure log and present this to her primary care provider at their next appointment.  LDL is 79 she is taking atorvastatin daily.  Primary care provider will continue to manage vascular risk factors.    We discussed interventions, will plan to see the patient back in 5 months. Neena understands and agrees with this plan.     Plan:     Symptom management  - Continue speech therapy   - Driving Evaluation paperwork given  - Working with PCP for increased anxiety, psychiatry referral placed by PCP    Secondary prevention  Continue Plavix 75 mg tablet daily Plus 81 mg ASA for 90 days  Then switch to ASA 81 mg daily. If unable to tolerate ASA please contact neurology.      Lifestyle  - mediterranean diet  - exercise    Risk Factors   Elevated blood Pressure  BP: 151/88  - Goal <140/90  - Continue preventive therapy:  Metoprolol 25 mg tablet  - Create blood pressure log and present to primary care provider.  May need an adjustment on medications to remain at goal     Elevated cholesterol levels   LDL: 79  - Goal < 70 mg/dl  - Continue preventive therapy: Atorvastatin 20 mg tablet    --- Cardiology referral placed to discuss bradycardia  --- Plan to follow-up with your primary care provider to manage your vascular risk factors  --- Plan on follow up in the Neurology Clinic in 5 months.  --- Please feel free to reach out if you have any further questions or concerns.  --- Seek immediate medical attention if an emergency arises or if your health becomes progressively worse.     For any acute neurologic deficits she was advised to  go directly to the hospital rather than call the clinic.    It was a pleasure to meet you today!     Total Time: Total time spent for face to face visit, reviewing labs/imaging studies, counseling and coordination of care was: 1 Hour spent on the date of the encounter doing chart review, review of test results, patient visit, documentation and discussion with family       This note was dictated using voice recognition software.  Any grammatical or context distortions are unintentional and inherent to the software.    Carol Wagner, DNP, APRN, CNP  [facility]

## 2022-12-14 ENCOUNTER — OFFICE VISIT (OUTPATIENT)
Dept: NEUROLOGY | Facility: CLINIC | Age: 82
End: 2022-12-14
Attending: NURSE PRACTITIONER
Payer: COMMERCIAL

## 2022-12-14 VITALS
BODY MASS INDEX: 19.18 KG/M2 | HEART RATE: 57 BPM | WEIGHT: 112.34 LBS | DIASTOLIC BLOOD PRESSURE: 88 MMHG | SYSTOLIC BLOOD PRESSURE: 151 MMHG | HEIGHT: 64 IN

## 2022-12-14 DIAGNOSIS — I63.9 ACUTE CVA (CEREBROVASCULAR ACCIDENT) (H): ICD-10-CM

## 2022-12-14 PROCEDURE — 99215 OFFICE O/P EST HI 40 MIN: CPT

## 2022-12-14 NOTE — PATIENT INSTRUCTIONS
Plan:     Symptom management  - Continue speech therapy   - Driving Evaluation paperwork given  - Working with PCP for increased anxiety, psychiatry referral placed by PCP    Secondary prevention  Continue Plavix 75 mg tablet daily Plus 81 mg ASA for 90 days  Then switch to ASA 81 mg daily. If unable to tolerate ASA please contact neurology.      Lifestyle  - mediterranean diet  - exercise    Risk Factors   Elevated blood Pressure  BP: 151/88  - Goal <140/90  - Continue preventive therapy: Metoprolol 25 mg tablet  - Create blood pressure log and present to primary care provider.  May need an adjustment on medications to remain at goal     Elevated cholesterol levels   LDL: 79  - Goal < 70 mg/dl  - Continue preventive therapy: Atorvastatin 20 mg tablet    --- Cardiology referral placed to discuss bradycardia  --- Plan to follow-up with your primary care provider to manage your vascular risk factors  --- Plan on follow up in the Neurology Clinic in 5 months.  --- Please feel free to reach out if you have any further questions or concerns.  --- Seek immediate medical attention if an emergency arises or if your health becomes progressively worse.     For any acute neurologic deficits she was advised to  go directly to the hospital rather than call the clinic.    It was a pleasure to meet you today!

## 2022-12-14 NOTE — LETTER
12/14/2022         RE: Neena Castellano  [address] [address]  [facility] [address]        Dear Colleague,    Thank you for referring your patient, Neena Castellano, to the [facility] [place]. Please see a copy of my visit note below.    __________________________________________________________    ___________________________________  ESTABLISHED PATIENT NEUROLOGY NOTE    DATE OF VISIT: 12/14/2022  MRN: [id number]  PATIENT NAME: Neena Castellano  YOB: 1940      Chief Complaint: Patient presents with:  Stroke: Hospital follow up/ many questions      SUBJECTIVE:                                                      HISTORY OF PRESENT ILLNESS:  History of Present Illness: Neena Castellano is a 82 year old female presenting for follow-up for Acute and subacute L MCA territory infarcts in setting of L M2 occlusion suspected embolic stroke of undetermined source.     She was hospitalized at [facility] on 11/01/22 - 11/05/22. Prior to the hospital stay, she had a past medical history of recent COVID-19 infection taking Paxlovid, HTN, former tobacco use disorder, on PTA estradiol cream, Lipitor 10 mg daily,.    She presented to the hospital with speech changes. /81. CT/CTA with L MCA early ischemic changes and CTA with L M2 occlusion. CTP with large mismatch volume. S/p EVT TICI 0. MRI with L MCA ischemic infarcts.     Stroke Evaluation summarized:    MRI/Head CT MRI: subacute L insular cortex and mid L temporal lobe ischemic infarct with petechial hemorrhage, acute ischemic infarcts to L frontal and L parietal lobes with small foci anterior b/l frontal lobe infarcts, L posterior parietal lobe along with L thalamus  CTH repeat: no ICH, mild iodine staining in L insular region  CTH: subtle assymetric hypoattenuation in posterior L insular cortex and L superior temporal gyrus concerning for acute ischemia (ASPECT 8), no evidence of ICH   Intracranial Vasculature CTA  head: occlusion dominant inferior L M2 division   Cervical Vasculature CTA neck: mild b/l carotid bifurcation atherosclerosis without significant stenosis      Echocardiogram EF 60-65%, no wma, LA mild dilation   EKG/Telemetry Sinus bradycardia with PACs (bigeminpete)   Other Testing IR carotid cerebral angiogram: clot burden decreased after two passes with solitaire stent retriever and imperative suction aspiration but residual distal thrombus, TICI 0      LDL  11/1/2022: 79 mg/dL   A1C  11/1/2022: 5.9 %   Troponin No lab value available in past 48 hrs   long term outpatient blood pressure goal <130/80 to be achieved slowly over the next several weeks if able to tolerate without worsening symptoms, recommend home monitoring twice daily in AM and PM, keep log and bring to f/u with PCP  -Plavix 75 mg daily indefinitely (ASA allergy)  -LDL 79, Lipitor 20 mg daily    Today 12/14/22   Neena arrives to the clinic for her poststroke follow-up.  Her daughter Rohan accompanies her to the appointment.  Neena has moderate aphasia noted during appointment.  Her daughter states that she woke up and her speech did not make sense.  She was brought to [facility] and had a stroke work-up.  She currently is aphasic.  No other residual effects from her stroke.  Denies difficulty with swallowing, visual changes, facial droop, unilateral weakness or numbness and tingling.  Physical therapy has signed off and approved Neena to work out independently.  She sees speech therapy once a week.  Her daughter states that she continues to have a difficult time following commands and has some aphasia noted.  She feels her symptoms have improved drastically since her stroke.  Her daughter notes that recently she started taking deeper breaths.  She will intermittently take a deep breath.  Neena states she notices this with increased anxiety.  Her primary care provider has increased her sertraline and referred her to psychiatry.    Secondary  prevention includes antiplatelet therapy.  Rohan states that her primary care provider put her back on daily aspirin 81 mg.  She is currently taking Plavix 75 mg with aspirin 81 mg.  She denies an allergy to aspirin.  Neena eats healthy meals and exercises on a regular basis.  She has 2 vascular risk factors:  hypertension and hyperlipidemia.  Her blood pressure is elevated at 151/88 today in clinic.  She states that her primary care provider is the process of adjusting her metoprolol.  LDL is 79 she is taking atorvastatin daily.  Tolerating all of her medications well.    Cardiac event monitor completed.  No evidence of atrial fibrillation.  Rohan is worried about findings: bradycardia.       Current Prevention Medications: Taking medication as prescribed.  In addition PCP started her on aspirin 81 mg    Atorvastatin 20 mg tablet  Plavix 75 mg tablet  Metoprolol 25 mg tablet    Perceived Side Effects: No    Medication Notes:   AED Medication Compliance:  compliant   Using a pill box:  Yes     Current Medications:   albuterol (PROAIR HFA/PROVENTIL HFA/VENTOLIN HFA) 108 (90 Base) MCG/ACT inhaler, Inhale 1-2 puffs into the lungs every 4 hours as needed  atorvastatin (LIPITOR) 20 MG tablet, Take 1 tablet (20 mg) by mouth every evening  bimatoprost (LATISSE) 0.03 % external opthalmic solution, Apply 1 drop topically nightly as needed (apply to eye lashes)  calcium citrate-vitamin D (CITRACAL) 315-200 MG-UNIT TABS per tablet, Take 2 tablets by mouth 2 times daily  cetirizine (ZYRTEC) 10 MG tablet, Take 10 mg by mouth daily as needed  clobetasol (TEMOVATE) 0.05 % external ointment, Apply topically 2 times daily as needed (rash)  clopidogrel (PLAVIX) 75 MG tablet, Take 1 tablet (75 mg) by mouth daily  coenzyme Q-10 200 MG CAPS, Take 200 mg by mouth daily  cycloSPORINE (RESTASIS) 0.05 % ophthalmic emulsion, Apply 1 drop to eye 2 times daily as needed for dry eyes  estradiol (ESTRACE) 0.1 MG/GM vaginal cream, Place 2 g  vaginally twice a week Once or twice weekly  famotidine (PEPCID) 20 MG tablet, Take 1 tablet (20 mg) by mouth 2 times daily  hydrocortisone (CORTAID) 1 % external cream, Apply topically 2 times daily as needed for rash  ipratropium (ATROVENT) 0.03 % nasal spray, Spray 2 sprays in nostril daily as needed  ketoconazole (NIZORAL) 2 % external cream, Apply topically 2 times daily as needed for itching  metoprolol tartrate (LOPRESSOR) 25 MG tablet, Take 1 tablet (25 mg) by mouth 2 times daily  multivitamin (CENTRUM SILVER) tablet, Take 1 tablet by mouth daily  polyethylene glycol-propylene glycol (SYSTANE ULTRA) 0.4-0.3 % SOLN ophthalmic solution, Place 1 drop into both eyes every hour as needed for dry eyes  sertraline (ZOLOFT) 25 MG tablet, Take 25 mg by mouth daily    No current facility-administered medications on file prior to visit.    Past Medical History:   Patient  has no past medical history on file.  Surgical History:  She  has a past surgical history that includes REPAIR OF HAMMERTOE,ONE (Right, 11/7/2014); Bunionectomy (Right, 11/7/2014); Hysterectomy (1993); Oophorectomy (1993); Cataract Extraction, Bilateral; Biopsy breast (Left); Colonoscopy (2012); and IR Carotid Cerebral Angiogram Bilateral (11/1/2022).  Family and Social History:  Reviewed, and she  reports that she quit smoking about 33 years ago. She has never used smokeless tobacco. She reports that she does not currently use alcohol.  Reviewed, and family history includes Ovarian Cancer (age of onset: 83.00) in her mother.    RECENT DIAGNOSTIC STUDIES:   Labs:    Coagulation studies:  Recent Labs   Lab Test 11/02/22  0457 11/01/22  1041   INR 1.12 1.01        Lipid panel:  Recent Labs   Lab Test 11/01/22  1041 07/08/16  1431   CHOL 164 211*   HDL 53 65   LDL 79 126   TRIG 162* 99       HbA1C:  Recent Labs   Lab Test 11/01/22  1041   A1C 5.9*       Troponin:  Recent Labs   Lab Test 11/01/22  1041   TROPONINIS 8     No lab results found.  No lab  "results found.    Imaging: See HPI     REVIEW OF SYSTEMS:                                                      10-point review of systems is negative except as mentioned above in HPI.    EXAM:                                                      Physical Exam:   Vitals: BP (!) 151/88   Pulse 57   Ht 1.626 m (5' 4\")   Wt 51 kg (112 lb 5.4 oz)   BMI 19.28 kg/m    BMI= Body mass index is 19.28 kg/m .  GENERAL: NAD.  HEENT: NC/AT.  PULM: Non-labored breathing.   Neurologic:  MENTAL STATUS: Alert, attentive. Speech is Aphasic. Limited comprehension. Normal concentration. Difficult to assess fund of knowledge with aphasia. Unable to verbalize different objects or repeat sentences.   CRANIAL NERVES: Visual fields intact to confrontation. Pupils equally, round and reactive to light. Facial sensation and movement normal. EOM full. [place]. Trapezius strength intact. Palate moves symmetrically. Tongue midline.  MOTOR: 5/5 in proximal and distal muscle groups of upper and lower extremities. Tone and bulk normal.   DTRs: Intact and symmetric in biceps, BR and patellae.   SENSATION: Normal light touch throughout.   COORDINATION: finger nose finger- difficult due to limited comprehension. Finger tapping normal. Knee heel shin normal.  STATION AND GAIT: Romberg Negative. Good postural reflexes. Casual gait and tandem Normal  right hand-dominant.      ASSESSMENT and PLAN:                                                      Assessment:    ICD-10-CM    1. Acute CVA (cerebrovascular accident) (H)  I63.9 Stroke Hospital Follow Up     Adult Cardiology [facility] Referral        Neena Castellano is a 82 year old female presenting for follow-up for Acute and subacute L MCA territory infarcts in setting of L M2 occlusion suspected embolic stroke of undetermined source. She was hospitalized at [facility] on 11/01/22 - 11/05/22. Prior to the hospital stay, she had a past medical history of recent COVID-19 infection " taking Paxlovid, HTN, former tobacco use disorder, on PTA estradiol cream, Lipitor 10 mg daily. She presented to the hospital with speech changes. /81. CT/CTA with L MCA early ischemic changes and CTA with L M2 occlusion.  MRI with L MCA ischemic infarcts.     Since being discharged Neena remains aphasic.  She sees speech therapy once a week.  Her daughter reports that she is drastically improved since her stroke.  She is cleared by other therapies.  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  Information provided.     Her primary care provider placed her back on aspirin. She is currently taking Plavix 75 mg with aspirin 81 mg.  She denies an allergy to aspirin.  Okay to stop Plavix after 90 days if tolerating aspirin as monotherapy.  Encouraged them to call the neurology clinic if daily aspirin becomes a problem.  At that point we can switch her back to Plavix daily.  She completed the cardiac event monitor.  No atrial fibrillation noted.  She did have episodes of bradycardia.  Her daughter would like to speak to her cardiologist about this.  Referral sent.  Neena eats healthy meals and exercises on a regular basis. Her blood pressure is elevated at 151/88 today in clinic.  She states that her primary care provider is the process of adjusting her metoprolol.  I encouraged her to create a blood pressure log and present this to her primary care provider at their next appointment.  LDL is 79 she is taking atorvastatin daily.  Primary care provider will continue to manage vascular risk factors.    We discussed interventions, will plan to see the patient back in 5 months. Neena understands and agrees with this plan.     Plan:     Symptom management  - Continue speech therapy   - Driving Evaluation paperwork given  - Working with PCP for increased anxiety, psychiatry referral placed by PCP    Secondary prevention  Continue Plavix 75 mg tablet daily Plus 81  mg ASA for 90 days  Then switch to ASA 81 mg daily. If unable to tolerate ASA please contact neurology.      Lifestyle  - mediterranean diet  - exercise    Risk Factors   Elevated blood Pressure  BP: 151/88  - Goal <140/90  - Continue preventive therapy: Metoprolol 25 mg tablet  - Create blood pressure log and present to primary care provider.  May need an adjustment on medications to remain at goal     Elevated cholesterol levels   LDL: 79  - Goal < 70 mg/dl  - Continue preventive therapy: Atorvastatin 20 mg tablet    --- Cardiology referral placed to discuss bradycardia  --- Plan to follow-up with your primary care provider to manage your vascular risk factors  --- Plan on follow up in the Neurology Clinic in 5 months.  --- Please feel free to reach out if you have any further questions or concerns.  --- Seek immediate medical attention if an emergency arises or if your health becomes progressively worse.     For any acute neurologic deficits she was advised to  go directly to the hospital rather than call the clinic.    It was a pleasure to meet you today!     Total Time: Total time spent for face to face visit, reviewing labs/imaging studies, counseling and coordination of care was: 1 Hour spent on the date of the encounter doing chart review, review of test results, patient visit, documentation and discussion with family       This note was dictated using voice recognition software.  Any grammatical or context distortions are unintentional and inherent to the software.    Carol Wagner, CRISTINA, APRN, CNP  [facility]       Again, thank you for allowing me to participate in the care of your patient.        Sincerely,        JIAN Fry CNP

## 2022-12-14 NOTE — NURSING NOTE
Chief Complaint   Patient presents with     Stroke     Hospital follow up/ many questions     Aarti Clement on 12/14/2022 at 1:11 PM

## 2022-12-30 ENCOUNTER — OFFICE VISIT (OUTPATIENT)
Dept: CARDIOLOGY | Facility: CLINIC | Age: 82
End: 2022-12-30
Payer: COMMERCIAL

## 2022-12-30 VITALS
WEIGHT: 117.8 LBS | BODY MASS INDEX: 20.87 KG/M2 | SYSTOLIC BLOOD PRESSURE: 165 MMHG | HEIGHT: 63 IN | DIASTOLIC BLOOD PRESSURE: 74 MMHG | HEART RATE: 64 BPM

## 2022-12-30 DIAGNOSIS — I63.9 ACUTE CVA (CEREBROVASCULAR ACCIDENT) (H): ICD-10-CM

## 2022-12-30 DIAGNOSIS — I10 PRIMARY HYPERTENSION: Primary | Chronic | ICD-10-CM

## 2022-12-30 DIAGNOSIS — E78.5 DYSLIPIDEMIA: ICD-10-CM

## 2022-12-30 PROCEDURE — 99204 OFFICE O/P NEW MOD 45 MIN: CPT | Performed by: INTERNAL MEDICINE

## 2022-12-30 RX ORDER — CEFUROXIME AXETIL 500 MG/1
500 TABLET ORAL 2 TIMES DAILY
COMMUNITY
Start: 2022-12-28 | End: 2023-01-02

## 2022-12-30 RX ORDER — METOPROLOL SUCCINATE 25 MG/1
25 TABLET, EXTENDED RELEASE ORAL DAILY
Qty: 90 TABLET | Refills: 3 | Status: SHIPPED | OUTPATIENT
Start: 2022-12-30 | End: 2023-04-12

## 2022-12-30 RX ORDER — METOPROLOL SUCCINATE 50 MG/1
1 TABLET, EXTENDED RELEASE ORAL
COMMUNITY
Start: 2022-12-21 | End: 2022-12-30 | Stop reason: DRUGHIGH

## 2022-12-30 RX ORDER — LOSARTAN POTASSIUM 25 MG/1
25 TABLET ORAL DAILY
Qty: 90 TABLET | Refills: 3 | Status: SHIPPED | OUTPATIENT
Start: 2022-12-30 | End: 2023-01-30

## 2022-12-30 RX ORDER — UBIDECARENONE 200 MG
200 CAPSULE ORAL DAILY
COMMUNITY
Start: 2022-12-30

## 2022-12-30 NOTE — PROGRESS NOTES
CARDIOLOGY CLINIC CONSULT    REASON FOR CONSULT: bradycardia    PRIMARY CARE PHYSICIAN:  Bridget Lopez    HISTORY OF PRESENT ILLNESS:  Neena Castellano is a very nice 82 year old female patient presenting for her follow-up hospitalization in November 2022 for CVA and follow-up bradycardia.  She  Acute left MCA M2 occlusion with mechanical thrombectomy on November 1, 2022.  She has residual aphasia.  She is living at home with her daughter.  Her daughter assists with history today as the patient does have significant challenges with communicating.    She does not have chest pain, shortness of breath or dizziness.  She does have fatigue.    During her hospitalization she had an echocardiogram which showed normal left and right ventricular function with left ventricular ejection fraction 60 to 65%.      PAST MEDICAL HISTORY Per chart review and discussion with patient and her daughter:  1.  Atrophic vaginitis.  2.  GERD.  3.  Hypertension.  4.  Mild intermittent asthma.  5.  Allergic rhinitis.  6.  Anxiety  7.  Osteoarthritis.  8.  History of eustachian dysfunction.  9.  Former smoker.    MEDICATIONS:  Current Outpatient Medications   Medication     albuterol (PROAIR HFA/PROVENTIL HFA/VENTOLIN HFA) 108 (90 Base) MCG/ACT inhaler     Aspirin 81 MG CAPS     atorvastatin (LIPITOR) 20 MG tablet     bimatoprost (LATISSE) 0.03 % external opthalmic solution     calcium citrate-vitamin D (CITRACAL) 315-200 MG-UNIT TABS per tablet     cefuroxime (CEFTIN) 500 MG tablet     Cholecalciferol (VITAMIN D3) 25 MCG (1000 UT) CAPS     clobetasol (TEMOVATE) 0.05 % external ointment     clopidogrel (PLAVIX) 75 MG tablet     coenzyme Q-10 200 MG CAPS     cycloSPORINE (RESTASIS) 0.05 % ophthalmic emulsion     estradiol (ESTRACE) 0.1 MG/GM vaginal cream     famotidine (PEPCID) 20 MG tablet     hydrocortisone (CORTAID) 1 % external cream     ipratropium (ATROVENT) 0.03 % nasal spray     metoprolol succinate ER (TOPROL XL) 50 MG 24 hr tablet      Multiple Vitamins-Minerals (EYE VITAMINS PO)     multivitamin (CENTRUM SILVER) tablet     polyethylene glycol-propylene glycol (SYSTANE ULTRA) 0.4-0.3 % SOLN ophthalmic solution     sertraline (ZOLOFT) 25 MG tablet     cetirizine (ZYRTEC) 10 MG tablet     ketoconazole (NIZORAL) 2 % external cream     No current facility-administered medications for this visit.       ALLERGIES:  Allergies   Allergen Reactions     Codeine Nausea and Vomiting     Hydrocodone-Acetaminophen Nausea     Unknown on which vicodin        Levofloxacin Muscle Pain (Myalgia) and Other (See Comments)     Joint pain  Joint pain  Shoulder pain       Sulfa Drugs Itching     Levothyroxine      Other reaction(s): Arthralgia     Methotrexate      Other reaction(s): *Unknown - Pt Doesn't Remember  Patient and family do not recall this allergy       Tetanus-Diphtheria Toxoids      Other reaction(s): Edema  Horse serum tetanus.       Tetracyclines Unknown       SOCIAL HISTORY:  I have reviewed this patient's social history and updated it with pertinent information if needed. Neena Castellano  reports that she quit smoking about 33 years ago. She has never used smokeless tobacco. She reports that she does not currently use alcohol.  Also see HPI        REVIEW OF SYSTEMS:  Skin:        Eyes:       ENT:  Positive for    Respiratory:  Positive for cough  Cardiovascular:  Negative;palpitations;chest pain;lightheadedness;dizziness;syncope or near-syncope;cyanosis;fatigue;exercise intolerance;edema    Gastroenterology:      Genitourinary:       Musculoskeletal:       Neurologic:  Positive for stroke;speech problems  Psychiatric:       Heme/Lymph/Imm:       Endocrine:         PHYSICAL EXAM:      BP: (!) 165/74 Pulse: 64            Vital Signs with Ranges  Pulse:  [64] 64  BP: (165)/(74) 165/74  117 lbs 12.8 oz    Constitutional: awake, alert, no distress  Eyes: sclera nonicteric  ENT: trachea midline  Respiratory: Clear to auscultation  bilaterally  Cardiovascular: Regular rate and rhythm 1/6 apical systolic murmur  GI: nondistended, nontender, bowel sounds present  Skin: dry, no rash trace bilateral lower extremity edema  Musculoskeletal: grossly normal muscle bulk and tone  Neuropsychiatric: Word finding difficulty      DATA:   LAST CHOLESTEROL:  Lab Results   Component Value Date    CHOL 164 11/01/2022     Lab Results   Component Value Date    HDL 53 11/01/2022     Lab Results   Component Value Date    LDL 79 11/01/2022     Lab Results   Component Value Date    TRIG 162 11/01/2022     No results found for: CHOLHDLRATIO    LAST BMP:  Lab Results   Component Value Date     11/03/2022      Lab Results   Component Value Date    POTASSIUM 4.0 11/03/2022     Lab Results   Component Value Date    CHLORIDE 108 11/03/2022     Lab Results   Component Value Date    JAMES 8.7 11/03/2022     Lab Results   Component Value Date    CO2 24 11/03/2022     Lab Results   Component Value Date    BUN 12 11/03/2022     Lab Results   Component Value Date    CR 0.68 11/05/2022     Lab Results   Component Value Date     11/03/2022       LAST CBC:  Lab Results   Component Value Date    WBC 6.9 11/03/2022     Lab Results   Component Value Date    RBC 3.74 11/03/2022     Lab Results   Component Value Date    HGB 11.8 11/03/2022     Lab Results   Component Value Date    HCT 33.7 11/03/2022     Lab Results   Component Value Date    MCV 90 11/03/2022     Lab Results   Component Value Date    MCH 31.6 11/03/2022     Lab Results   Component Value Date    MCHC 35.0 11/03/2022     Lab Results   Component Value Date    RDW 13.0 11/03/2022     Lab Results   Component Value Date     11/05/2022         EKG 11/1/2022 Chaya sent regular rate with PACs in a pattern of bigeminy      Echo 11/2022 Interpretation Summary Left ventricular systolic function is normal. The visual ejection fraction is60-65%. No regional wall motion abnormalities noted.The right ventricle is  normal in structure, function and size.The aortic valve is trileaflet with aortic valve sclerosis. There is traceaortic regurgitation.There is mild (1+) mitral regurgitation.There is no pericardial effusion. There are no prior studies available for comparison.    30 day ziopatch 11-12/2022 no afib.  Baseline rhythm was sinus bradycardia heart rate 57 she has first-degree AV block and occasional dry PACs.  Strips available show heart rate dipping into the 40s.    Coronarary angiogram: No results found.      ASSESSMENT:  1. Bradydardia. Sinus bradycardia with first degree AV block.  She does have fatigue and bradycardia may be contributing to this.  She is on an AV rené blocker and we will reduce the dose.  We may gradually titrate off metoprolol but given her age and frailty I prefer a gradual change in medications.  2. Hypertension.  Will initiate losartan today.  3. Recent CVA  4. Dyslipidemia    RECOMMENDATIONS:  1. Follow up in about 3 months with cardiology SOPHIA  2. Bring the log of home BP and pulse to next visit  3. Decrease metoprolol succinate to 25mg PO daily  4. Start losartan 25mg PO daily  5. BMP in about one week    Genia Bell MD [facility]  Text Page

## 2022-12-30 NOTE — LETTER
12/30/2022    Bridget Lopez MD  [facility] [address]  [facility] [address]    RE: Neena Castellano       Dear Colleague,     I had the pleasure of seeing Neena Castellano in the [facility].  CARDIOLOGY CLINIC CONSULT    REASON FOR CONSULT: bradycardia    PRIMARY CARE PHYSICIAN:  Bridget Lopez    HISTORY OF PRESENT ILLNESS:  eNena Castellano is a very nice 82 year old female patient presenting for her follow-up hospitalization in November 2022 for CVA and follow-up bradycardia.  She  Acute left MCA M2 occlusion with mechanical thrombectomy on November 1, 2022.  She has residual aphasia.  She is living at home with her daughter.  Her daughter assists with history today as the patient does have significant challenges with communicating.    She does not have chest pain, shortness of breath or dizziness.  She does have fatigue.    During her hospitalization she had an echocardiogram which showed normal left and right ventricular function with left ventricular ejection fraction 60 to 65%.      PAST MEDICAL HISTORY Per chart review and discussion with patient and her daughter:  1.  Atrophic vaginitis.  2.  GERD.  3.  Hypertension.  4.  Mild intermittent asthma.  5.  Allergic rhinitis.  6.  Anxiety  7.  Osteoarthritis.  8.  History of eustachian dysfunction.  9.  Former smoker.    MEDICATIONS:  Current Outpatient Medications   Medication     albuterol (PROAIR HFA/PROVENTIL HFA/VENTOLIN HFA) 108 (90 Base) MCG/ACT inhaler     Aspirin 81 MG CAPS     atorvastatin (LIPITOR) 20 MG tablet     bimatoprost (LATISSE) 0.03 % external opthalmic solution     calcium citrate-vitamin D (CITRACAL) 315-200 MG-UNIT TABS per tablet     cefuroxime (CEFTIN) 500 MG tablet     Cholecalciferol (VITAMIN D3) 25 MCG (1000 UT) CAPS     clobetasol (TEMOVATE) 0.05 % external ointment     clopidogrel (PLAVIX) 75 MG tablet     coenzyme Q-10 200 MG CAPS     cycloSPORINE (RESTASIS) 0.05 % ophthalmic emulsion      estradiol (ESTRACE) 0.1 MG/GM vaginal cream     famotidine (PEPCID) 20 MG tablet     hydrocortisone (CORTAID) 1 % external cream     ipratropium (ATROVENT) 0.03 % nasal spray     metoprolol succinate ER (TOPROL XL) 50 MG 24 hr tablet     Multiple Vitamins-Minerals (EYE VITAMINS PO)     multivitamin (CENTRUM SILVER) tablet     polyethylene glycol-propylene glycol (SYSTANE ULTRA) 0.4-0.3 % SOLN ophthalmic solution     sertraline (ZOLOFT) 25 MG tablet     cetirizine (ZYRTEC) 10 MG tablet     ketoconazole (NIZORAL) 2 % external cream     No current facility-administered medications for this visit.       ALLERGIES:  Allergies   Allergen Reactions     Codeine Nausea and Vomiting     Hydrocodone-Acetaminophen Nausea     Unknown on which vicodin        Levofloxacin Muscle Pain (Myalgia) and Other (See Comments)     Joint pain  Joint pain  Shoulder pain       Sulfa Drugs Itching     Levothyroxine      Other reaction(s): Arthralgia     Methotrexate      Other reaction(s): *Unknown - Pt Doesn't Remember  Patient and family do not recall this allergy       Tetanus-Diphtheria Toxoids      Other reaction(s): Edema  Horse serum tetanus.       Tetracyclines Unknown       SOCIAL HISTORY:  I have reviewed this patient's social history and updated it with pertinent information if needed. Neena Castellano  reports that she quit smoking about 33 years ago. She has never used smokeless tobacco. She reports that she does not currently use alcohol.  Also see HPI        REVIEW OF SYSTEMS:  Skin:        Eyes:       ENT:  Positive for    Respiratory:  Positive for cough  Cardiovascular:  Negative;palpitations;chest pain;lightheadedness;dizziness;syncope or near-syncope;cyanosis;fatigue;exercise intolerance;edema    Gastroenterology:      Genitourinary:       Musculoskeletal:       Neurologic:  Positive for stroke;speech problems  Psychiatric:       Heme/Lymph/Imm:       Endocrine:         PHYSICAL EXAM:      BP: (!) 165/74 Pulse: 64             Vital Signs with Ranges  Pulse:  [64] 64  BP: (165)/(74) 165/74  117 lbs 12.8 oz    Constitutional: awake, alert, no distress  Eyes: sclera nonicteric  ENT: trachea midline  Respiratory: Clear to auscultation bilaterally  Cardiovascular: Regular rate and rhythm 1/6 apical systolic murmur  GI: nondistended, nontender, bowel sounds present  Skin: dry, no rash trace bilateral lower extremity edema  Musculoskeletal: grossly normal muscle bulk and tone  Neuropsychiatric: Word finding difficulty      DATA:   LAST CHOLESTEROL:  Lab Results   Component Value Date    CHOL 164 11/01/2022     Lab Results   Component Value Date    HDL 53 11/01/2022     Lab Results   Component Value Date    LDL 79 11/01/2022     Lab Results   Component Value Date    TRIG 162 11/01/2022     No results found for: CHOLHDLRATIO    LAST BMP:  Lab Results   Component Value Date     11/03/2022      Lab Results   Component Value Date    POTASSIUM 4.0 11/03/2022     Lab Results   Component Value Date    CHLORIDE 108 11/03/2022     Lab Results   Component Value Date    JAMES 8.7 11/03/2022     Lab Results   Component Value Date    CO2 24 11/03/2022     Lab Results   Component Value Date    BUN 12 11/03/2022     Lab Results   Component Value Date    CR 0.68 11/05/2022     Lab Results   Component Value Date     11/03/2022       LAST CBC:  Lab Results   Component Value Date    WBC 6.9 11/03/2022     Lab Results   Component Value Date    RBC 3.74 11/03/2022     Lab Results   Component Value Date    HGB 11.8 11/03/2022     Lab Results   Component Value Date    HCT 33.7 11/03/2022     Lab Results   Component Value Date    MCV 90 11/03/2022     Lab Results   Component Value Date    MCH 31.6 11/03/2022     Lab Results   Component Value Date    MCHC 35.0 11/03/2022     Lab Results   Component Value Date    RDW 13.0 11/03/2022     Lab Results   Component Value Date     11/05/2022         EKG 11/1/2022 Chaya sent regular rate with PACs in a  pattern of bigeminy      Echo 11/2022 Interpretation Summary Left ventricular systolic function is normal. The visual ejection fraction is60-65%. No regional wall motion abnormalities noted.The right ventricle is normal in structure, function and size.The aortic valve is trileaflet with aortic valve sclerosis. There is traceaortic regurgitation.There is mild (1+) mitral regurgitation.There is no pericardial effusion. There are no prior studies available for comparison.    30 day ziopatch 11-12/2022 no afib.  Baseline rhythm was sinus bradycardia heart rate 57 she has first-degree AV block and occasional dry PACs.  Strips available show heart rate dipping into the 40s.    Coronarary angiogram: No results found.      ASSESSMENT:  1. Bradydardia. Sinus bradycardia with first degree AV block.  She does have fatigue and bradycardia may be contributing to this.  She is on an AV rené blocker and we will reduce the dose.  We may gradually titrate off metoprolol but given her age and frailty I prefer a gradual change in medications.  2. Hypertension.  Will initiate losartan today.  3. Recent CVA  4. Dyslipidemia    RECOMMENDATIONS:  1. Follow up in about 3 months with cardiology SOPHIA  2. Bring the log of home BP and pulse to next visit  3. Decrease metoprolol succinate to 25mg PO daily  4. Start losartan 25mg PO daily  5. BMP in about one week    Genia Bell MD [facility]  Text Page       Thank you for allowing me to participate in the care of your patient.      Sincerely,     Genia Bell MD     [facility]  cc:   Carol Wagner, JIAN CNP  [address] [place] [address]

## 2023-01-05 ENCOUNTER — TELEPHONE (OUTPATIENT)
Dept: CARDIOLOGY | Facility: CLINIC | Age: 83
End: 2023-01-05
Payer: COMMERCIAL

## 2023-01-05 DIAGNOSIS — I63.9 ACUTE CVA (CEREBROVASCULAR ACCIDENT) (H): ICD-10-CM

## 2023-01-05 DIAGNOSIS — I10 PRIMARY HYPERTENSION: Chronic | ICD-10-CM

## 2023-01-05 NOTE — TELEPHONE ENCOUNTER
Spoke with Remy regarding pt's BP and headaches.   Remy reports pt does not currently have a headache and has sensitive sinuses so it could be related to that.   Remy and his sisters wanted to follow up regarding pt's BP and recent med changes.   When pt saw Dr. Bell on 12/30/22, losartan 25mg daily was added and metoprolol succinate was reduced from 50mg to 25mg daily.    Informed Remy that we would like to know when pt is taking the medications and also when she is checking her BP.   Explained that we like to see what pt's BPs are about 2 hours after taking the medications.   Remy said he will review with his sisters and they will call back with an update either by phone or mychart.  Provided Remy with team 1's call back number and he gave verbal understanding.

## 2023-01-05 NOTE — TELEPHONE ENCOUNTER
[facility] Call Center    Phone Message    May a detailed message be left on voicemail: no     Reason for Call: Other: Remy called to advise his mom Lynsey experienced a severe stroke in November. Dr. Bell switched her medication at the last office visit 12/30/22 and since that time Lynsey has been having chronic headaches and elevated B/P. Her B/P reading today was 162/67 at 8:30 am. They are unsure if it is just 1 medication or the combination, but would like some guidance or next steps. Please reach out to Remy at [phone].       Action Taken: Other: [facility]    Travel Screening: Not Applicable

## 2023-01-09 NOTE — TELEPHONE ENCOUNTER
Spoke with Fidel regarding Dr. Barlow recommendations. Will continue to monitor pt's BP for the next few days and will call with update.

## 2023-01-09 NOTE — TELEPHONE ENCOUNTER
Parth Barlow MD  You 1 hour ago (2:57 PM)     SV  Overall BP seems to slowly improving. Can take a few weeks for maximal impact of BP medication changes. Prednisone can also increase BP. Patient should continue to keep a record of BP and if it is still elevated (systolic >140 or diastolic >90) can consider increasing losartan.     Thanks   Parth

## 2023-01-09 NOTE — TELEPHONE ENCOUNTER
Fidel called back to report BP readings from over the weekend for pt.     Pt takes her losartan 25mg and Toprol XL 25mg around 8pm before she goes to bed.   Pt will check her BP a few times in the morning between 7-8am.     1/6/23 : 175/84, 157/83 ... pt was in urgent care for sinus infection 155/88 HR 68 at 2pm       1/7/23 : 182/79 HR 53, 185/80 HR 53, 171/75 HR 51    1/8/23 : 166/66 HR 55, 139/72 HR 55    1/9/23 : 158/73 HR 56, 154/75 HR 54    When pt was seen in urgent care on 1/6/23, she was prescribed low dose prednisone 20mg for 5 days and also a 10 day course of Omnicef.     Pt last saw Dr. Bell on 12/30/22 and per the note :  1. Bradydardia. Sinus bradycardia with first degree AV block.  She does have fatigue and bradycardia may be contributing to this.  She is on an AV rené blocker and we will reduce the dose.  We may gradually titrate off metoprolol but given her age and frailty I prefer a gradual change in medications.  2. Hypertension.  Will initiate losartan today.  -----------------------------------------------------------------    Dr. Bell out of the office this week, will route to resource MD to review.

## 2023-01-13 ENCOUNTER — TELEPHONE (OUTPATIENT)
Dept: CARDIOLOGY | Facility: CLINIC | Age: 83
End: 2023-01-13

## 2023-01-13 NOTE — TELEPHONE ENCOUNTER
Spoke with Fidel about Dr. Bell's recommendation to continue monitoring.   Pt has been off of prednisone since 1/11/23. Pt was in ER with nose bleed on 1/12/22 and pt's BP was 138/62.   Pt should be getting BMP drawn next week.   Fidel gave verbal understanding and will update us as needed.

## 2023-01-13 NOTE — TELEPHONE ENCOUNTER
Genia Bell MD  You 27 minutes ago (3:05 PM)       I agree with Dr. Barlow. Continue to monitor for two weeks. BP will be higher with prednisone and we do not wish to overcorrect.     Genia Bell MD on 1/13/2023 at 3:05 PM

## 2023-01-13 NOTE — TELEPHONE ENCOUNTER
Fidel called back to report BP readings from the past few days.    1/10/23 : 154/76 HR 55, 154/73 HR 52 around 9:30am    1/11/23 : 147/81 HR 55, 161/75 HR 53, 157/65 HR 52 around 9:30am    1/12/23 : 151/76 HR 53, 138/79 57 around 9am    1/13/23 : 162/74 HR 66, 151/73 HR 57, 155/78 HR 66 around 7:11am    Will route update to Dr. Bell to review.

## 2023-01-13 NOTE — TELEPHONE ENCOUNTER
Health Call Center    Phone Message    May a detailed message be left on voicemail: yes     Reason for Call: Other: Please fax Dr. Owens lab orders to [facility]. Fax number is . Phone number is      Action Taken: Message routed to:  Other: Cardiology    Travel Screening: Not Applicable       Thank you!  [facility]

## 2023-01-18 NOTE — TELEPHONE ENCOUNTER
Received update from Fidel regarding pt's BP readings.     1/14/23 : 137/73 HR 64, 135/72 HR 64 around 9am    1/15/23 : 142/64 HR 50, 129/71 HR 57, 125/68 HR 60 around 9am     1/16/23 : 139/66 HR 58, 133/70 HR 57 around 8am    1/17/23 : 125/71 HR 57, 132/72 HR 56, 133/67 HR 56 around 8:30am    1/18/23 : 151/70 HR 59, 158/76 HR 58, 163/77 HR 57 around 7:30am    Pt had BMP completed yesterday (1/18/23) that Dr. Bell ordered after visit on 12/30/22.

## 2023-01-23 ENCOUNTER — PATIENT OUTREACH (OUTPATIENT)
Dept: NEUROLOGY | Facility: CLINIC | Age: 83
End: 2023-01-23
Payer: COMMERCIAL

## 2023-01-23 NOTE — TELEPHONE ENCOUNTER
Left detailed voicemail for Fidel regarding Dr. Gutierres's recommendation to increase losartan to 37.5mg daily.   Informed Fidel, pt can try that and see how her BP responds.   Left team 1's call back number if they have any questions or concerns.

## 2023-01-23 NOTE — TELEPHONE ENCOUNTER
More updated BP readings from over the weekend.     1/19/23 : 158/80 HR 60, 157/77 HR 60 - taken around 9am    1/20/23 : 149/73 HR 52, 140/70 HR 51, 151/72 HR 52 - taken around 6:30am    1/21/23 : 154/75 HR 55, 142/68 HR 57 - taken around 9am     Pt's family would like to know if these blood pressures are OK or if an adjustment to her BP meds needs to be made.   Pt has consistently had BPs in the 130s-150s/60-70s since the beginning of January - see previous notes with BP since 1/9/23.     Dr. Bell last saw pt on 12/30/22 and per the note :  - Bradycardia. Sinus bradycardia with first degree AV block.  She does have fatigue and bradycardia may be contributing to this.  She is on an AV rené blocker and we will reduce the dose.  We may gradually titrate off metoprolol but given her age and frailty I prefer a gradual change in medications.  - started losartan 25mg daily  - decreased Toprol XL from 50mg to 25mg daily.     Pt takes her medications around 8pm at night.   Routing to resource MD to review

## 2023-01-23 NOTE — PROGRESS NOTES
"    Stroke RN Care Coordination - 90 Day Modified Roxy Note     SITUATION     Neena Castellano is a 82 year old female who is receiving support for:  Stroke (90 Day mRS)    BACKGROUND     Roxy \"Lynsey\" SARAH Castellano is an 83 yo F with pertinent past medical history of recent COVID-19 infection taking Paxlovid, HTN, former tobacco use disorder, on PTA estradiol cream, Lipitor 10 mg daily.     She presented to the ED 11/1/22 with speech changes. /81. CT/CTA with L MCA early ischemic changes and CTA with L M2 occlusion. CTP with large mismatch volume. S/p EVT TICI 0. MRI with L MCA ischemic infarcts    Last Recorded Modified Richfield Scale  Interval: Discharge  Score: 4-Moderately severe disability; unable to walk without assistance and unable to attend to own bodily    ASSESSMENT     Spoke with pt and daughter regarding her recovery. She continues to work with [employer] 2x/week and has been able to return to reading novels independently which was a big pass time for her.    Current Modified Roxy Scale (90 Day)  Modified Roxy Score - 90 Day: 3 (1/23/2023  2:19 PM)     01/23/23 1419   Simplified Modified Richfield Scale Questionnaire (smRSq)   Could you live alone without help from another person?  This means being able to bathe, use the toilet, shop, prepare or get meals and manage finances. No   Can you walk from one room to another without help from another person? Yes, score is 3   Modified Roxy Score - 90 Day 3     PLAN     Follow-up plan:  No further planned outreaches at this time.    Cathleen Giraldo BS, RN, SCRN  RN Stroke Neurology Care Coordinator  [facility] Service Line    "

## 2023-01-30 RX ORDER — LOSARTAN POTASSIUM 25 MG/1
37.5 TABLET ORAL DAILY
Qty: 135 TABLET | Refills: 1 | Status: SHIPPED | OUTPATIENT
Start: 2023-01-30 | End: 2023-02-06

## 2023-02-03 ENCOUNTER — TELEPHONE (OUTPATIENT)
Dept: CARDIOLOGY | Facility: CLINIC | Age: 83
End: 2023-02-03
Payer: COMMERCIAL

## 2023-02-03 DIAGNOSIS — I10 PRIMARY HYPERTENSION: Chronic | ICD-10-CM

## 2023-02-03 DIAGNOSIS — I63.9 ACUTE CVA (CEREBROVASCULAR ACCIDENT) (H): ICD-10-CM

## 2023-02-03 NOTE — TELEPHONE ENCOUNTER
Received VM from pt's son Fidel reporting home BP readings:     1/22/23 - 9:30 am /79 HR 57, /75 HR 57     1/23/23 - 9:45 am /82 HR 63, /76 HR 63, /75 HR 62    1/24/23 - 8:26 am /85 HR 55 /82  HR 62    1/25/23 - 9:30 am /78 HR 59  /75 HR 60 /76 HR 59     1/26/23 - /79 HR 58 /76 HR 60 /77 HR 58    1/27/23 - 7:30 am /79 HR 55, /73 HR 57    1/28/23 - /74 HR 64, /70 HR 61, /59 HR 59, /67 HR 59    1/29/23 - 10:00 am /66 HR 54, /72 HR 54    1/30/23 - 7:08 am /64 HR 50, /74 HR 49      1/31/23 - 8:00 am /66 HR 55, /68 HR 56     2/1/23 - 9:24 am /62 HR 59, /70 HR 57, /71 HR 58     2/2/23 - 9:05 am /76 HR 58, /66 HR 57    2/3/23 - 7:30 am /65 HR 54, /64 HR 54    Fidel is wondering if Dr. Bell would recommend any changes. Losartan was increased to 37.5 mg daily per Dr. Gutierres on 1/23/23 in Dr. Bell's absence. Pt also takes metoprolol succinate 25 mg daily and takes both medications around 8:00 pm. Will route to Dr. Bell to review.

## 2023-02-06 RX ORDER — LOSARTAN POTASSIUM 50 MG/1
50 TABLET ORAL DAILY
Qty: 90 TABLET | Refills: 3 | Status: SHIPPED | OUTPATIENT
Start: 2023-02-06 | End: 2023-04-12

## 2023-02-06 NOTE — TELEPHONE ENCOUNTER
Received response from Dr. Bell:     Genia Bell MD Hair, Ashley W RN  Caller: Unspecified (3 days ago,  4:18 PM)  BP is near goal.   Recommend increasing losartan to 50mg PO daily.     Genia Bell MD on 2/6/2023 at 9:03 AM     Called back to pt's son Fidel and reviewed recommendation to increase losartan to 50 mg daily. Fidel verbalized understanding and agreement with plan. Fidel will continue to monitor pt's BP and send updates. New prescription sent to pharmacy for increased dose, advised pt can take 2 of her current 25 mg tablets daily to use up her supply.

## 2023-02-14 NOTE — TELEPHONE ENCOUNTER
Received another BP reading update from pt's son (Fidel).  Pt's losartan was increased to 50mg daily on 2/6/23 per Dr. Bell.   Pt takes the losartan and metoprolol succinate 25 mg daily around 8:00 pm daily and pt checks her BP daily in the AM between 7-9am.     2/4/23 : 119/65 HR 66, 127/61 HR 65 - taken around 9:27am    2/5/23 : 138/75 HR 63, 132/72 HR 62, 139/70 HR 61 - taken around 8:33am    2/6/23 : 128/75 HR 61, 136/71 HR 62 - taken around 8:21am     2/7/23 : 135/72 HR 67, 133/72 HR 63 - taken around 9:27am    2/8/23 : 129/72 HR 60, 125/70 HR 61 - taken around 9:38am    2/9/23 : 144/80 HR 57, 136/78 HR 60 - taken around 7:37am    2/10/23 : 125/61 HR 62, 127/66 HR 62, 115/66 HR 64 - taken around 8:06am    2/11/23 : 136/75 HR 56, 118/62 HR 58 - taken around 8:37am    2/12/23 : 153/78 HR 53, 156/81 HR 55 - taken around 9am    2/13/23 : 146/78 HR 54, 151/78 HR 55, 152/88 HR 64 - taken around 9am

## 2023-02-15 NOTE — TELEPHONE ENCOUNTER
Genia Bell MD  [facility] Heart Team 1  Caller: Unspecified (1 week ago)  Thank you. The BP and pulse measurements below are within a good range. Please continue current doses.   Genia Bell MD on 2/14/2023 at 5:16 PM

## 2023-02-20 ENCOUNTER — MYC MEDICAL ADVICE (OUTPATIENT)
Dept: CARDIOLOGY | Facility: CLINIC | Age: 83
End: 2023-02-20
Payer: COMMERCIAL

## 2023-02-20 DIAGNOSIS — I63.9 CEREBROVASCULAR ACCIDENT (CVA), UNSPECIFIED MECHANISM (H): ICD-10-CM

## 2023-02-20 DIAGNOSIS — I10 HTN (HYPERTENSION): Primary | Chronic | ICD-10-CM

## 2023-02-20 NOTE — TELEPHONE ENCOUNTER
Received [employer] message from pt's son:     Lynsey Castellano [facility] Heart Team 1  Phone Number: [phone]     Dr. Bell,   My name is Fidel Castellano (Neena Castellano's son) - pleased to meet you virtually.  Per my discussion with Marie on 15 Feb., here are the following BP/HR data points from 16 Feb. through today:     1. 16 Feb.  KT=669/72, HR=65 and MB=458/73, HR=59   2. 17 Feb. ZC=398/78, HR=58 and BR=570/78, HR=59   3. 18 Feb. KD=120/74, HR=63 and YV=420/72, HR=62   4. 19 Feb. RW=195/67, HR=57 and JB=489/70, HR=51   5. 20 Feb. AP=012/69, HR=56 and OJ=180/69, HR=57     Questions:   1. Given BP and HR data these past weeks, what is the long term plan for BP medication?   2. Do we need to continue monitoring BP and HR data on a daily basis?   3. Do you foresee a point where use of the BP medications can be reduced or removed from the daily care regimen?     Thank you.     Respectfully,   Fidel     Per telephone note on 2/14/23 Dr. Bell advised BP and pulse readings are in good range, no medication changes. Pt is on losartan 50 mg daily and metoprolol succinate 25 mg daily. Routing to Dr. Bell to review.

## 2023-02-28 NOTE — TELEPHONE ENCOUNTER
Received response from Dr. Bell:     Genia Bell MD  [facility] Heart Team 1  Phone Number: [phone]     If she is doing well we can extend follow up out to June 2023 with me.   Genia Bell MD on 2/28/2023 at 4:20 PM         [employer] message sent to pt with recommendation.

## 2023-04-12 ENCOUNTER — OFFICE VISIT (OUTPATIENT)
Dept: CARDIOLOGY | Facility: CLINIC | Age: 83
End: 2023-04-12
Attending: INTERNAL MEDICINE
Payer: COMMERCIAL

## 2023-04-12 VITALS
SYSTOLIC BLOOD PRESSURE: 152 MMHG | OXYGEN SATURATION: 97 % | DIASTOLIC BLOOD PRESSURE: 82 MMHG | WEIGHT: 118.1 LBS | HEART RATE: 62 BPM | HEIGHT: 63 IN | BODY MASS INDEX: 20.93 KG/M2

## 2023-04-12 DIAGNOSIS — I10 PRIMARY HYPERTENSION: Chronic | ICD-10-CM

## 2023-04-12 DIAGNOSIS — E78.5 DYSLIPIDEMIA: ICD-10-CM

## 2023-04-12 DIAGNOSIS — I63.9 ACUTE CVA (CEREBROVASCULAR ACCIDENT) (H): ICD-10-CM

## 2023-04-12 PROCEDURE — 99213 OFFICE O/P EST LOW 20 MIN: CPT | Performed by: NURSE PRACTITIONER

## 2023-04-12 RX ORDER — LOSARTAN POTASSIUM 50 MG/1
75 TABLET ORAL DAILY
Qty: 90 TABLET | Refills: 3 | Status: SHIPPED | OUTPATIENT
Start: 2023-04-12 | End: 2023-06-19

## 2023-04-12 RX ORDER — METOPROLOL SUCCINATE 25 MG/1
12.5 TABLET, EXTENDED RELEASE ORAL DAILY
Qty: 60 TABLET | Refills: 2 | Status: SHIPPED | OUTPATIENT
Start: 2023-04-12 | End: 2023-08-28

## 2023-04-12 RX ORDER — DULOXETIN HYDROCHLORIDE 20 MG/1
60 CAPSULE, DELAYED RELEASE ORAL DAILY
COMMUNITY
Start: 2023-02-20

## 2023-04-12 NOTE — PROGRESS NOTES
Cardiology Clinic Progress Note  Neena Castellano MRN# [id number]   YOB: 1940 Age: 82 year old   Primary Cardiologist:Dr. Bell Reason for visit: 3 month follow up             Assessment and Plan:       1.  Hypertension   I reviewed multiple blood pressure logs from over the last 3 months, most recently in February 2023 after which her losartan while she was on 50 mg daily.  Blood pressure readings she brought in from more recently show labile control with systolics in the 120s up to the 140s.  Pulse is in the 60 bpm range.  With her ongoing fatigue and borderline blood pressure control we will reduce her metoprolol to 12.5 mg daily and increase her losartan to 75 mg daily.  Her family wonders if her losartan is the cause of her ongoing fatigue however I question if cymbalta is contributing to ongoing fatigue versus beta-blocker or losartan.     2.  Recent embolic CVA  -Follow up with Neurology in June 2023  -Continue aspirin 81 mg daily    3.  Dyslipidemia, controlled with Lipitor 20 mg daily  -December 2022 LDL 79    4.  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    Patient was seen by Dr. Bell in December 2022 following her hospitalization for her CVA.  During that hospitalization she had an echocardiogram which showed normal left and right ventricular function with LVEF 60 to 65%.  A  30-day Zio patch monitor which did not show any atrial fibrillation.  Her baseline rhythm was sinus bradycardia with a heart rate of 57, first-degree AV block, with occasional PACs.  Some of her heart rates did go into the 40s.  During her clinic visit they discussed her fatigue as well as bradycardia and reduced her dose of metoprolol to 25 mg daily as well as started losartan.    Since the last visit several blood pressure and pulse logs were sent to our clinic for review and her losartan has been subsequently uptitrated to 50 mg daily.      Patient is here today for follow-up of her hypertension. She is very hard of hearing and her daughter is present with her today.  She notes her fatigue was not initially present following her hospitalization, however worsened after she was discharged. She was started on cymbalta following her hospitalization for anxiety/panic and her celexa was discontinued.     She continues to feel fatigued and has not noticed a significant difference with reduction in her metoprolol. She has been exercising 30 minutes each day on a stationery bike but is unable to walk through grocery store without needing to stop and sit on a bench.    Patient denies chest pain or chest tightness. Denies dizziness, lightheadedness or other presyncopal symptoms. Denies tachycardia or palpitations.  Denies shortness of breath, with apnea, or PND.  She does have episodic anxiety which will make her feel short of breath and she is able to return to baseline with breathing exercises.    BMP from January 2023 done at [facility] showed sodium 138, potassium 43, BUN 18, creatinine 0.87, and GFR 67.     Blood pressure 152/82 and HR 62 in clinic today.        Recent Hospitalizations   November 2022 embolic CVA        Social History      Social History     Socioeconomic History     Marital status:      Spouse name: Not on file     Number of children: Not on file     Years of education: Not on file     Highest  "education level: Not on file   Occupational History     Not on file   Tobacco Use     Smoking status: Former     Types: Cigarettes     Quit date: 1989     Years since quittin.1     Smokeless tobacco: Never   Vaping Use     Vaping status: Not on file   Substance and Sexual Activity     Alcohol use: Not Currently     Drug use: Not on file     Sexual activity: Not on file   Other Topics Concern     Not on file   Social History Narrative     Not on file     Social Determinants of Health     Financial Resource Strain: Not on file   Food Insecurity: Not on file   Transportation Needs: Not on file   Physical Activity: Not on file   Stress: Not on file   Social Connections: Not on file   Intimate Partner Violence: Not on file   Housing Stability: Not on file            Review of Systems:   Skin:  not assessed     Eyes:  not assessed    ENT:  not assessed    Respiratory:  Positive for sleep apnea  Cardiovascular:    fatigue;Positive for  Gastroenterology: not assessed    Genitourinary:  not assessed    Musculoskeletal:  not assessed    Neurologic:  not assessed    Psychiatric:  not assessed    Heme/Lymph/Imm:  not assessed    Endocrine:  not assessed           Physical Exam:   Vitals: BP (!) 152/82   Pulse 62   Ht 1.607 m (5' 3.25\")   Wt 53.6 kg (118 lb 1.6 oz)   SpO2 97%   BMI 20.76 kg/m     Wt Readings from Last 4 Encounters:   23 53.6 kg (118 lb 1.6 oz)   22 53.4 kg (117 lb 12.8 oz)   22 51 kg (112 lb 5.4 oz)   22 50.9 kg (112 lb 3.4 oz)     GEN: well nourished, in no acute distress.  HEENT:  Pupils equal, round. Sclerae nonicteric. Bilateral hearing aids, [place]  NECK: Supple, no masses appreciated.  C/V:  Regular rate and rhythm, no rub or gallop.  I/VI apical systolic murmur  RESP: Respirations are unlabored. Clear to auscultation bilaterally without wheezing, rales, or rhonchi.  GI: Abdomen soft, nontender.  EXTREM: No LE edema.  NEURO: Alert and oriented, cooperative. Intermittent " asphasia  SKIN: Warm and dry.        Data:     LIPID RESULTS:  Lab Results   Component Value Date    CHOL 164 11/01/2022    HDL 53 11/01/2022    LDL 79 11/01/2022    TRIG 162 (H) 11/01/2022     LIVER ENZYME RESULTS:  Lab Results   Component Value Date    AST 37 09/16/2019    ALT 24 09/16/2019     CBC RESULTS:  Lab Results   Component Value Date    WBC 6.9 11/03/2022    RBC 3.74 (L) 11/03/2022    HGB 11.8 11/03/2022    HCT 33.7 (L) 11/03/2022    MCV 90 11/03/2022    MCH 31.6 11/03/2022    MCHC 35.0 11/03/2022    RDW 13.0 11/03/2022     11/05/2022     BMP RESULTS:  Lab Results   Component Value Date     11/03/2022    POTASSIUM 4.0 11/03/2022    CHLORIDE 108 11/03/2022    CO2 24 11/03/2022    ANIONGAP 6 11/03/2022     (H) 11/03/2022    BUN 12 11/03/2022    CR 0.68 11/05/2022    GFRESTIMATED 86 11/05/2022    GFRESTIMATED >60 09/16/2019    GFRESTBLACK >60 09/16/2019    JAMES 8.7 11/03/2022      A1C RESULTS:  Lab Results   Component Value Date    A1C 5.9 (H) 11/01/2022     INR RESULTS:  Lab Results   Component Value Date    INR 1.12 11/02/2022    INR 1.01 11/01/2022            Medications     Current Outpatient Medications   Medication Sig Dispense Refill     albuterol (PROAIR HFA/PROVENTIL HFA/VENTOLIN HFA) 108 (90 Base) MCG/ACT inhaler Inhale 1-2 puffs into the lungs every 4 hours as needed       Aspirin 81 MG CAPS Take by mouth daily       atorvastatin (LIPITOR) 20 MG tablet Take 1 tablet (20 mg) by mouth every evening 30 tablet 0     bimatoprost (LATISSE) 0.03 % external opthalmic solution Apply 1 drop topically nightly as needed (apply to eye lashes)       calcium citrate-vitamin D (CITRACAL) 315-200 MG-UNIT TABS per tablet Take 2 tablets by mouth 2 times daily       cetirizine (ZYRTEC) 10 MG tablet Take 10 mg by mouth daily as needed       Cholecalciferol (VITAMIN D3) 25 MCG (1000 UT) CAPS        clobetasol (TEMOVATE) 0.05 % external ointment Apply topically 2 times daily as needed (rash)        coenzyme Q-10 200 MG CAPS Take 200 mg by mouth daily       cycloSPORINE (RESTASIS) 0.05 % ophthalmic emulsion Apply 1 drop to eye 2 times daily as needed for dry eyes       DULoxetine (CYMBALTA) 20 MG capsule Take 40 mg by mouth daily       estradiol (ESTRACE) 0.1 MG/GM vaginal cream Place 2 g vaginally twice a week Once or twice weekly       famotidine (PEPCID) 20 MG tablet Take 1 tablet (20 mg) by mouth 2 times daily 60 tablet 0     hydrocortisone (CORTAID) 1 % external cream Apply topically 2 times daily as needed for rash       ipratropium (ATROVENT) 0.03 % nasal spray Spray 2 sprays in nostril daily as needed       ketoconazole (NIZORAL) 2 % external cream Apply topically 2 times daily as needed for itching       losartan (COZAAR) 50 MG tablet Take 1.5 tablets (75 mg) by mouth daily 90 tablet 3     metoprolol succinate ER (TOPROL XL) 25 MG 24 hr tablet Take 0.5 tablets (12.5 mg) by mouth daily 60 tablet 2     Multiple Vitamins-Minerals (EYE VITAMINS PO) Take by mouth 2 times daily       multivitamin (CENTRUM SILVER) tablet Take 1 tablet by mouth daily       polyethylene glycol-propylene glycol (SYSTANE ULTRA) 0.4-0.3 % SOLN ophthalmic solution Place 1 drop into both eyes every hour as needed for dry eyes            Past Medical History   History reviewed. No pertinent past medical history.  Past Surgical History:   Procedure Laterality Date     BIOPSY BREAST Left     benign     BUNIONECTOMY Right 11/7/2014    Procedure: RIGHT 1ST METATARSAL PHALANGEAL JOINT FUSION ;  Surgeon: Washington Blue DPM;  Location: [place] [facility];  Service:      CATARACT EXTRACTION, BILATERAL       COLONOSCOPY  2012     HC REPAIR OF HAMMERTOE,ONE Right 11/7/2014    Procedure: RIGHT 2ND HAMMERTOE CORRECTION;  Surgeon: Washington Blue DPM;  Location: [place] [facility];  Service: Podiatry     HYSTERECTOMY  1993     IR CAROTID CEREBRAL ANGIOGRAM BILATERAL  11/1/2022     OOPHORECTOMY  1993     Family History   Problem Relation Age of Onset      Ovarian Cancer Mother 83     Myocardial Infarction Father      Hereditary Breast and Ovarian Cancer Syndrome No family hx of      Breast Cancer No family hx of      Cancer No family hx of      Colon Cancer No family hx of      Endometrial Cancer No family hx of             Allergies   Codeine, Hydrocodone-acetaminophen, Levofloxacin, Sulfa drugs, Levothyroxine, Methotrexate, Tetanus-diphtheria toxoids, and Tetracyclines        Hui Ribeiro NP  [facility]  Pager: [phone]

## 2023-04-12 NOTE — PATIENT INSTRUCTIONS
Your blood pressure was elevated at your appointment today.  Elevated blood pressure can increase your risk of a heart attack, stroke and heart failure.  For this reason, we feel it is important to monitor your blood pressure closely.  If you have access to a home blood pressure monitor or are able to check your blood pressure at a local pharmacy in the next week we would like you to do so and call our clinic with those readings. Please call [phone] (Karisma Kidz) and leave a message with your name, date of birth, blood pressure reading, date and location it was completed. If your blood pressure remains elevated your care team will be notified.  We appreciate being a part of your healthcare team and look forward to hearing from you soon.        Today's Recommendations    Reduce metoprolol XL to 12.5mg daily (half of the 25mg tablet)  Increase your losartan to 75mg daily (1.5 tablets of the 50mg)  Discuss your cymbalta with your psychiatrist  Please follow up with me in 2 months.    Please send a [facility] message or call [phone] to the RN team with questions or concerns.     Scheduling number [phone]  JIAN London, CNP

## 2023-04-12 NOTE — LETTER
4/12/2023    Bridget Lopez MD  [facility] [address]  [place] [place] [facility] [place] [address]    RE: Neena Castellano       Dear Colleague,     I had the pleasure of seeing Neena Castellano in the [facility].  Cardiology Clinic Progress Note  Neena Castellano MRN# [id number]   YOB: 1940 Age: 82 year old   Primary Cardiologist:Dr. Bell Reason for visit: 3 month follow up             Assessment and Plan:       1.  Hypertension   I reviewed multiple blood pressure logs from over the last 3 months, most recently in February 2023 after which her losartan while she was on 50 mg daily.  Blood pressure readings she brought in from more recently show labile control with systolics in the 120s up to the 140s.  Pulse is in the 60 bpm range.  With her ongoing fatigue and borderline blood pressure control we will reduce her metoprolol to 12.5 mg daily and increase her losartan to 75 mg daily.  Her family wonders if her losartan is the cause of her ongoing fatigue however I question if cymbalta is contributing to ongoing fatigue versus beta-blocker or losartan.     2.  Recent embolic CVA  -Follow up with Neurology in June 2023  -Continue aspirin 81 mg daily    3.  Dyslipidemia, controlled with Lipitor 20 mg daily  -December 2022 LDL 79    4.  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    Patient was seen by Dr. Bell in December 2022 following her hospitalization for her CVA.  During that hospitalization she had an echocardiogram which showed normal left and right ventricular function with LVEF 60 to 65%.  A 30-day Zio patch monitor which did not show any atrial fibrillation.  Her baseline rhythm was sinus bradycardia with a heart rate of 57, first-degree AV block, with occasional PACs.  Some of her heart rates did go into the 40s.  During her clinic visit they discussed her fatigue as well as bradycardia and reduced her dose of metoprolol to 25 mg daily as well as started losartan.    Since the last visit several blood pressure and pulse logs were sent to our clinic for review and her losartan has been subsequently uptitrated to 50 mg daily.      Patient is here today for follow-up of her hypertension. She is very hard of hearing and her daughter is present with her today.  She notes her fatigue was not initially present following her hospitalization, however worsened after she was discharged. She was started on cymbalta following her hospitalization for anxiety/panic and her celexa was discontinued.     She continues to feel fatigued and has not noticed a significant difference with reduction in her metoprolol. She has been exercising 30 minutes each day on a stationery bike but is unable to walk through grocery store without needing to stop and sit on a bench.    Patient denies chest pain or chest tightness. Denies dizziness, lightheadedness or other presyncopal symptoms. Denies tachycardia or palpitations.  Denies shortness of breath, with apnea, or PND.  She does have episodic anxiety which will make her feel short of breath and she is able to return to baseline with breathing exercises.    BMP from January 2023 done at [facility] showed sodium 138, potassium 43, BUN 18, creatinine 0.87, and GFR 67.     Blood pressure 152/82 and HR 62 in clinic today.        Recent Hospitalizations  "  2022 embolic CVA        Social History      Social History     Socioeconomic History    Marital status:      Spouse name: Not on file    Number of children: Not on file    Years of education: Not on file    Highest education level: Not on file   Occupational History    Not on file   Tobacco Use    Smoking status: Former     Types: Cigarettes     Quit date: 1989     Years since quittin.1    Smokeless tobacco: Never   Vaping Use    Vaping status: Not on file   Substance and Sexual Activity    Alcohol use: Not Currently    Drug use: Not on file    Sexual activity: Not on file   Other Topics Concern    Not on file   Social History Narrative    Not on file     Social Determinants of Health     Financial Resource Strain: Not on file   Food Insecurity: Not on file   Transportation Needs: Not on file   Physical Activity: Not on file   Stress: Not on file   Social Connections: Not on file   Intimate Partner Violence: Not on file   Housing Stability: Not on file            Review of Systems:   Skin:  not assessed     Eyes:  not assessed    ENT:  not assessed    Respiratory:  Positive for sleep apnea  Cardiovascular:    fatigue;Positive for  Gastroenterology: not assessed    Genitourinary:  not assessed    Musculoskeletal:  not assessed    Neurologic:  not assessed    Psychiatric:  not assessed    Heme/Lymph/Imm:  not assessed    Endocrine:  not assessed           Physical Exam:   Vitals: BP (!) 152/82   Pulse 62   Ht 1.607 m (5' 3.25\")   Wt 53.6 kg (118 lb 1.6 oz)   SpO2 97%   BMI 20.76 kg/m     Wt Readings from Last 4 Encounters:   23 53.6 kg (118 lb 1.6 oz)   22 53.4 kg (117 lb 12.8 oz)   22 51 kg (112 lb 5.4 oz)   22 50.9 kg (112 lb 3.4 oz)     GEN: well nourished, in no acute distress.  HEENT:  Pupils equal, round. Sclerae nonicteric. Bilateral hearing aids, [place]  NECK: Supple, no masses appreciated.  C/V:  Regular rate and rhythm, no rub or gallop.  I/VI apical " systolic murmur  RESP: Respirations are unlabored. Clear to auscultation bilaterally without wheezing, rales, or rhonchi.  GI: Abdomen soft, nontender.  EXTREM: No LE edema.  NEURO: Alert and oriented, cooperative. Intermittent asphasia  SKIN: Warm and dry.        Data:     LIPID RESULTS:  Lab Results   Component Value Date    CHOL 164 11/01/2022    HDL 53 11/01/2022    LDL 79 11/01/2022    TRIG 162 (H) 11/01/2022     LIVER ENZYME RESULTS:  Lab Results   Component Value Date    AST 37 09/16/2019    ALT 24 09/16/2019     CBC RESULTS:  Lab Results   Component Value Date    WBC 6.9 11/03/2022    RBC 3.74 (L) 11/03/2022    HGB 11.8 11/03/2022    HCT 33.7 (L) 11/03/2022    MCV 90 11/03/2022    MCH 31.6 11/03/2022    MCHC 35.0 11/03/2022    RDW 13.0 11/03/2022     11/05/2022     BMP RESULTS:  Lab Results   Component Value Date     11/03/2022    POTASSIUM 4.0 11/03/2022    CHLORIDE 108 11/03/2022    CO2 24 11/03/2022    ANIONGAP 6 11/03/2022     (H) 11/03/2022    BUN 12 11/03/2022    CR 0.68 11/05/2022    GFRESTIMATED 86 11/05/2022    GFRESTIMATED >60 09/16/2019    GFRESTBLACK >60 09/16/2019    JAMES 8.7 11/03/2022      A1C RESULTS:  Lab Results   Component Value Date    A1C 5.9 (H) 11/01/2022     INR RESULTS:  Lab Results   Component Value Date    INR 1.12 11/02/2022    INR 1.01 11/01/2022            Medications     Current Outpatient Medications   Medication Sig Dispense Refill    albuterol (PROAIR HFA/PROVENTIL HFA/VENTOLIN HFA) 108 (90 Base) MCG/ACT inhaler Inhale 1-2 puffs into the lungs every 4 hours as needed      Aspirin 81 MG CAPS Take by mouth daily      atorvastatin (LIPITOR) 20 MG tablet Take 1 tablet (20 mg) by mouth every evening 30 tablet 0    bimatoprost (LATISSE) 0.03 % external opthalmic solution Apply 1 drop topically nightly as needed (apply to eye lashes)      calcium citrate-vitamin D (CITRACAL) 315-200 MG-UNIT TABS per tablet Take 2 tablets by mouth 2 times daily      cetirizine  (ZYRTEC) 10 MG tablet Take 10 mg by mouth daily as needed      Cholecalciferol (VITAMIN D3) 25 MCG (1000 UT) CAPS       clobetasol (TEMOVATE) 0.05 % external ointment Apply topically 2 times daily as needed (rash)      coenzyme Q-10 200 MG CAPS Take 200 mg by mouth daily      cycloSPORINE (RESTASIS) 0.05 % ophthalmic emulsion Apply 1 drop to eye 2 times daily as needed for dry eyes      DULoxetine (CYMBALTA) 20 MG capsule Take 40 mg by mouth daily      estradiol (ESTRACE) 0.1 MG/GM vaginal cream Place 2 g vaginally twice a week Once or twice weekly      famotidine (PEPCID) 20 MG tablet Take 1 tablet (20 mg) by mouth 2 times daily 60 tablet 0    hydrocortisone (CORTAID) 1 % external cream Apply topically 2 times daily as needed for rash      ipratropium (ATROVENT) 0.03 % nasal spray Spray 2 sprays in nostril daily as needed      ketoconazole (NIZORAL) 2 % external cream Apply topically 2 times daily as needed for itching      losartan (COZAAR) 50 MG tablet Take 1.5 tablets (75 mg) by mouth daily 90 tablet 3    metoprolol succinate ER (TOPROL XL) 25 MG 24 hr tablet Take 0.5 tablets (12.5 mg) by mouth daily 60 tablet 2    Multiple Vitamins-Minerals (EYE VITAMINS PO) Take by mouth 2 times daily      multivitamin (CENTRUM SILVER) tablet Take 1 tablet by mouth daily      polyethylene glycol-propylene glycol (SYSTANE ULTRA) 0.4-0.3 % SOLN ophthalmic solution Place 1 drop into both eyes every hour as needed for dry eyes            Past Medical History   History reviewed. No pertinent past medical history.  Past Surgical History:   Procedure Laterality Date    BIOPSY BREAST Left     benign    BUNIONECTOMY Right 11/7/2014    Procedure: RIGHT 1ST METATARSAL PHALANGEAL JOINT FUSION ;  Surgeon: Washington Blue DPM;  Location: [place] [facility];  Service:     CATARACT EXTRACTION, BILATERAL      COLONOSCOPY  2012    HC REPAIR OF HAMMERTOE,ONE Right 11/7/2014    Procedure: RIGHT 2ND HAMMERTOE CORRECTION;  Surgeon: Washington Blue DPM;   Location: [place] [facility];  Service: Podiatry    HYSTERECTOMY  1993    IR CAROTID CEREBRAL ANGIOGRAM BILATERAL  11/1/2022    OOPHORECTOMY  1993     Family History   Problem Relation Age of Onset    Ovarian Cancer Mother 83    Myocardial Infarction Father     Hereditary Breast and Ovarian Cancer Syndrome No family hx of     Breast Cancer No family hx of     Cancer No family hx of     Colon Cancer No family hx of     Endometrial Cancer No family hx of             Allergies   Codeine, Hydrocodone-acetaminophen, Levofloxacin, Sulfa drugs, Levothyroxine, Methotrexate, Tetanus-diphtheria toxoids, and Tetracyclines        Hui Ribeiro NP  [facility] HEART CARE  Pager: [phone]      Thank you for allowing me to participate in the care of your patient.      Sincerely,     Hui Ribeiro NP     [facility]  cc:   Genia Bell MD  [id number] [id number]

## 2023-06-14 PROBLEM — Z86.73 HISTORY OF STROKE: Status: ACTIVE | Noted: 2022-11-01

## 2023-06-15 NOTE — PROGRESS NOTES
NEUROLOGY FOLLOW UP VISIT  NOTE       [facility] NEUROLOGY [place]  [address] [place] [address]  Tel: [phone]  Fax: [phone]  [email or web address]     Neena Castellano,  1940, MRN [id number]  PCP: Bridget Lopez  Date: 2023      ASSESSMENT & PLAN     Visit Diagnosis  1. H/O Lt M2 embolic stroke     Left M2 embolic infarction  82-year-old female with history of HTN, GERD, history of cigarette smoking in the past was admitted to the hospital with left M2 infarction.  Thrombectomy was attempted but it was unsuccessful.  Since then she has made quite remarkable improvement and has minimal speech difficulty.  I have recommended:    1.  Continue aspirin and Lipitor with goal of LDL less than 70  2.  Keep systolic blood pressure 120-130/80-90  3.  Continue speech and physical therapy  4.  Mediterranean diet  5.  Follow-up will be on as needed basis    Thank you again for this referral, please feel free to contact me if you have any questions.    Lee Roach MD  [facility]  (Formerly, [facility]     HISTORY OF PRESENT ILLNESS     Patient is a 82-year-old female with history of HTN, GERD who was admitted to the hospital with left M2 occlusion who returns for follow-up.  She was hospitalized at [facility] in 2022 with speech difficulty.  She had a CT of the head and CTA that showed left MCA early ischemic changes.  CTA confirmed M2 occlusion and there was a mismatch on CT perfusion.  She had thrombectomy attempted but it was not successful and afterwards MRI scan showed left MCA ischemic infarct.  Her LDL was 79 and was started on Plavix and subsequently switched to aspirin and also continued on Lipitor.  After discharge she went through physical therapy.  She also had cardiac monitor that did not show any atrial fibrillation.  She was told to continue with speech and physical therapy.  She feels her  speech has improved.  Although she still struggles occasionally with naming objects.     PROBLEM LIST   Patient Active Problem List   Diagnosis Code     Atrophic vaginitis N95.2     GERD (gastroesophageal reflux disease) K21.9     HTN (hypertension) I10     Mild intermittent asthma J45.20     AR (allergic rhinitis) J30.9     Anxiety - Panic attacks and OCD features F41.9     OA (osteoarthritis) M19.90     ETD (eustachian tube dysfunction) H69.80     Former smoker Z87.891     DNR (do not resuscitate) Z66     H/O Lt M2 embolic stroke Z86.73     Sensorineural hearing loss (SNHL) of both ears H90.3         PAST MEDICAL & SURGICAL HISTORY     Past Medical History:   Patient  has no past medical history on file.    Surgical History:  She  has a past surgical history that includes REPAIR OF HAMMERTOE,ONE (Right, 11/7/2014); Bunionectomy (Right, 11/7/2014); Hysterectomy (1993); Oophorectomy (1993); Cataract Extraction, Bilateral; Biopsy breast (Left); Colonoscopy (2012); and IR Carotid Cerebral Angiogram Bilateral (11/1/2022).     SOCIAL HISTORY     Reviewed, and she  reports that she quit smoking about 34 years ago. Her smoking use included cigarettes. She has never used smokeless tobacco. She reports that she does not currently use alcohol.     FAMILY HISTORY     Reviewed, and family history includes Myocardial Infarction in her father; Ovarian Cancer (age of onset: 83) in her mother.     ALLERGIES     Allergies   Allergen Reactions     Codeine Nausea and Vomiting     Hydrocodone-Acetaminophen Nausea     Unknown on which vicodin        Levofloxacin Muscle Pain (Myalgia) and Other (See Comments)     Joint pain  Joint pain  Shoulder pain       Sulfa Antibiotics Itching     Levothyroxine      Other reaction(s): Arthralgia     Methotrexate      Other reaction(s): *Unknown - Pt Doesn't Remember  Patient and family do not recall this allergy       Tetanus-Diphtheria Toxoids Td      Other reaction(s): Edema  Horse serum tetanus.        Tetracyclines & Related Unknown         REVIEW OF SYSTEMS     A 12 point review of system was performed and was negative except as outlined in the history of present illness.     HOME MEDICATIONS     Current Outpatient Rx   Medication Sig Dispense Refill     Aspirin 81 MG CAPS Take by mouth daily       atorvastatin (LIPITOR) 20 MG tablet Take 1 tablet (20 mg) by mouth every evening 30 tablet 0     calcium citrate-vitamin D (CITRACAL) 315-200 MG-UNIT TABS per tablet Take 2 tablets by mouth 2 times daily       Cholecalciferol (VITAMIN D3) 25 MCG (1000 UT) CAPS        coenzyme Q-10 200 MG CAPS Take 200 mg by mouth daily       cycloSPORINE (RESTASIS) 0.05 % ophthalmic emulsion Apply 1 drop to eye 2 times daily as needed for dry eyes       DULoxetine (CYMBALTA) 20 MG capsule Take 40 mg by mouth daily       estradiol (ESTRACE) 0.1 MG/GM vaginal cream Place 2 g vaginally twice a week Once or twice weekly       famotidine (PEPCID) 20 MG tablet Take 1 tablet (20 mg) by mouth 2 times daily 60 tablet 0     hydrocortisone (CORTAID) 1 % external cream Apply topically 2 times daily as needed for rash       ipratropium (ATROVENT) 0.03 % nasal spray Spray 2 sprays in nostril daily as needed       ketoconazole (NIZORAL) 2 % external cream Apply topically 2 times daily as needed for itching       losartan (COZAAR) 50 MG tablet Take 1.5 tablets (75 mg) by mouth daily 90 tablet 3     metoprolol succinate ER (TOPROL XL) 25 MG 24 hr tablet Take 0.5 tablets (12.5 mg) by mouth daily 60 tablet 2     Multiple Vitamins-Minerals (EYE VITAMINS PO) Take by mouth 2 times daily       multivitamin (CENTRUM SILVER) tablet Take 1 tablet by mouth daily       polyethylene glycol-propylene glycol (SYSTANE ULTRA) 0.4-0.3 % SOLN ophthalmic solution Place 1 drop into both eyes every hour as needed for dry eyes       albuterol (PROAIR HFA/PROVENTIL HFA/VENTOLIN HFA) 108 (90 Base) MCG/ACT inhaler Inhale 1-2 puffs into the lungs every 4 hours as needed   "     bimatoprost (LATISSE) 0.03 % external opthalmic solution Apply 1 drop topically nightly as needed (apply to eye lashes)       cetirizine (ZYRTEC) 10 MG tablet Take 10 mg by mouth daily as needed       clobetasol (TEMOVATE) 0.05 % external ointment Apply topically 2 times daily as needed (rash)           PHYSICAL EXAM     Vital signs  BP (!) 143/76 (BP Location: Left arm, Patient Position: Sitting)   Pulse 60   Ht 1.626 m (5' 4\")   Wt 53.1 kg (117 lb)   BMI 20.08 kg/m      Weight:   117 lbs 0 oz    Elderly female who is alert and oriented vital signs were reviewed and documented in electronic medical record.  Neck supple.  Neurologically she has difficulty with expressing herself at times and naming objects.  Comprehension is intact.  She has decreased hearing and uses hearing aids.  Rest of the cranial nerves are intact.  Motor strength 5/5.  Reflexes 2+ she has minimal dysmetria on finger-nose testing gait normal.     PERTINENT DIAGNOSTIC STUDIES     Following studies were reviewed:     CTA HEAD AND NECK 2022  CTA HEAD:  1. Occlusion of the dominant inferior division M2 branch of the left  middle cerebral artery.  2. No other high-grade stenosis or large vessel occlusion identified.     CTA NECK:  1. Mild bilateral carotid bifurcation atherosclerosis without  significant stenosis.      CT PERFUSION 11/1/2022  Findings consistent with a small core infarct in the left  middle artery territory centered in the lateral left temporal region,  with at least moderate surrounding presumed ischemic penumbra. Per the  RAPID perfusion maps, the volume of the core infarct (CBF less than  30%) = 7 mL, and the volume of the presumed ischemic penumbra = 48 mL,with mismatch ratio 7.9.    MRI BRAIN 11/1/2022  1. Subacute ischemia posterior left insular cortex and mid left temporal lobe with petechial hemorrhage.  2. Acute ischemia posterior left frontal to left parietal lobes along with small foci within the anterior " frontal lobes bilaterally, left posterior parietal lobe along with left thalamus.  3. Mild diffuse age related changes.    CEREBRAL ANGIOGRAM 11/1/2022  Unsuccessful mechanical thrombectomy of the left middle cerebral  artery M1 occlusion, with distal migration of thrombus and TICI 0  recanalization     PERTINENT LABS  Following labs were reviewed:  No visits with results within 3 Month(s) from this visit.   Latest known visit with results is:   Admission on 11/01/2022, Discharged on 11/05/2022   Component Date Value     Sodium 11/01/2022 136      Potassium 11/01/2022 4.4      Chloride 11/01/2022 100      Carbon Dioxide (CO2) 11/01/2022 24      Anion Gap 11/01/2022 12      Urea Nitrogen 11/01/2022 27      Creatinine 11/01/2022 0.94      Calcium 11/01/2022 9.9      Glucose 11/01/2022 106 (H)      GFR Estimate 11/01/2022 60 (L)      INR 11/01/2022 1.01      aPTT 11/01/2022 24      Troponin I High Sensitiv* 11/01/2022 8      Ventricular Rate 11/01/2022 51      Atrial Rate 11/01/2022 51      TN Interval 11/01/2022 182      QRS Duration 11/01/2022 84      QT 11/01/2022 462      QTc 11/01/2022 425      P [place] 11/01/2022 62      R AXIS 11/01/2022 35      T [place] 11/01/2022 63      Interpretation ECG 11/01/2022                      Value:Sinus bradycardia with Premature atrial complexes in a pattern of bigeminy  Otherwise normal ECG  No previous ECGs available  Confirmed by GENERATED REPORT, COMPUTER ([id number]),  Glenna Gutierrez ([id number]) on 11/1/2022 4:39:45 PM       WBC Count 11/01/2022 6.5      RBC Count 11/01/2022 4.70      Hemoglobin 11/01/2022 14.4      Hematocrit 11/01/2022 44.7      MCV 11/01/2022 95      MCH 11/01/2022 30.6      MCHC 11/01/2022 32.2      RDW 11/01/2022 13.2      Platelet Count 11/01/2022 280      % Neutrophils 11/01/2022 76      % Lymphocytes 11/01/2022 17      % Monocytes 11/01/2022 7      % Eosinophils 11/01/2022 0      % Basophils 11/01/2022 0      % Immature Granulocytes 11/01/2022 0      NRBCs  per 100 WBC 11/01/2022 0      Absolute Neutrophils 11/01/2022 4.9      Absolute Lymphocytes 11/01/2022 1.1      Absolute Monocytes 11/01/2022 0.5      Absolute Eosinophils 11/01/2022 0.0      Absolute Basophils 11/01/2022 0.0      Absolute Immature Granul* 11/01/2022 0.0      Absolute NRBCs 11/01/2022 0.0      Hemoglobin A1C 11/01/2022 5.9 (H)      Cholesterol 11/01/2022 164      Triglycerides 11/01/2022 162 (H)      Direct Measure HDL 11/01/2022 53      LDL Cholesterol Calculat* 11/01/2022 79      Non HDL Cholesterol 11/01/2022 111      LVEF  11/02/2022 60-65%      GLUCOSE BY METER POCT 11/01/2022 84      GLUCOSE BY METER POCT 11/01/2022 102 (H)      WBC Count 11/02/2022 7.1      RBC Count 11/02/2022 3.69 (L)      Hemoglobin 11/02/2022 11.7      Hematocrit 11/02/2022 34.1 (L)      MCV 11/02/2022 92      MCH 11/02/2022 31.7      MCHC 11/02/2022 34.3      RDW 11/02/2022 13.5      Platelet Count 11/02/2022 207      Sodium 11/02/2022 138      Potassium 11/02/2022 4.0      Chloride 11/02/2022 106      Carbon Dioxide (CO2) 11/02/2022 27      Anion Gap 11/02/2022 5      Urea Nitrogen 11/02/2022 21      Creatinine 11/02/2022 0.72      Calcium 11/02/2022 8.6      Glucose 11/02/2022 87      GFR Estimate 11/02/2022 83      INR 11/02/2022 1.12      aPTT 11/02/2022 26      GLUCOSE BY METER POCT 11/02/2022 86      CRP Inflammation 11/02/2022 6.1      Ferritin 11/02/2022 171      GLUCOSE BY METER POCT 11/02/2022 110 (H)      WBC Count 11/03/2022 6.9      RBC Count 11/03/2022 3.74 (L)      Hemoglobin 11/03/2022 11.8      Hematocrit 11/03/2022 33.7 (L)      MCV 11/03/2022 90      MCH 11/03/2022 31.6      MCHC 11/03/2022 35.0      RDW 11/03/2022 13.0      Platelet Count 11/03/2022 236      Sodium 11/03/2022 138      Potassium 11/03/2022 4.0      Chloride 11/03/2022 108      Carbon Dioxide (CO2) 11/03/2022 24      Anion Gap 11/03/2022 6      Urea Nitrogen 11/03/2022 12      Creatinine 11/03/2022 0.58      Calcium 11/03/2022 8.7       Glucose 11/03/2022 107 (H)      GFR Estimate 11/03/2022 90      Creatinine 11/05/2022 0.68      GFR Estimate 11/05/2022 86      Platelet Count 11/05/2022 304          Total time spent for face to face visit, reviewing labs/imaging studies, counseling and coordination of care was: 45 Minutes spent on the date of the encounter doing chart review, review of outside records, review of test results, interpretation of tests, patient visit, documentation and discussion with family       This note was dictated using voice recognition software.  Any grammatical or context distortions are unintentional and inherent to the software.    No orders of the defined types were placed in this encounter.     New Prescriptions    No medications on file     Modified Medications    No medications on file

## 2023-06-19 ENCOUNTER — OFFICE VISIT (OUTPATIENT)
Dept: CARDIOLOGY | Facility: CLINIC | Age: 83
End: 2023-06-19
Attending: NURSE PRACTITIONER
Payer: COMMERCIAL

## 2023-06-19 ENCOUNTER — LAB (OUTPATIENT)
Dept: LAB | Facility: CLINIC | Age: 83
End: 2023-06-19
Payer: COMMERCIAL

## 2023-06-19 ENCOUNTER — OFFICE VISIT (OUTPATIENT)
Dept: NEUROLOGY | Facility: CLINIC | Age: 83
End: 2023-06-19
Payer: COMMERCIAL

## 2023-06-19 VITALS
SYSTOLIC BLOOD PRESSURE: 143 MMHG | BODY MASS INDEX: 19.97 KG/M2 | WEIGHT: 117 LBS | HEIGHT: 64 IN | DIASTOLIC BLOOD PRESSURE: 76 MMHG | HEART RATE: 60 BPM

## 2023-06-19 VITALS
DIASTOLIC BLOOD PRESSURE: 77 MMHG | HEIGHT: 63 IN | OXYGEN SATURATION: 100 % | WEIGHT: 118.5 LBS | SYSTOLIC BLOOD PRESSURE: 167 MMHG | HEART RATE: 65 BPM | BODY MASS INDEX: 21 KG/M2

## 2023-06-19 DIAGNOSIS — R23.3 BRUISING, SPONTANEOUS: Primary | ICD-10-CM

## 2023-06-19 DIAGNOSIS — Z86.73 HISTORY OF STROKE: Primary | ICD-10-CM

## 2023-06-19 DIAGNOSIS — I63.9 ACUTE CVA (CEREBROVASCULAR ACCIDENT) (H): ICD-10-CM

## 2023-06-19 DIAGNOSIS — E78.5 DYSLIPIDEMIA: ICD-10-CM

## 2023-06-19 DIAGNOSIS — H90.3 SENSORINEURAL HEARING LOSS (SNHL) OF BOTH EARS: ICD-10-CM

## 2023-06-19 DIAGNOSIS — I10 PRIMARY HYPERTENSION: Chronic | ICD-10-CM

## 2023-06-19 DIAGNOSIS — R23.3 BRUISING, SPONTANEOUS: ICD-10-CM

## 2023-06-19 LAB
ERYTHROCYTE [DISTWIDTH] IN BLOOD BY AUTOMATED COUNT: 13.8 % (ref 10–15)
HCT VFR BLD AUTO: 36.7 % (ref 35–47)
HGB BLD-MCNC: 11.7 G/DL (ref 11.7–15.7)
MCH RBC QN AUTO: 30.9 PG (ref 26.5–33)
MCHC RBC AUTO-ENTMCNC: 31.9 G/DL (ref 31.5–36.5)
MCV RBC AUTO: 97 FL (ref 78–100)
PLATELET # BLD AUTO: 203 10E3/UL (ref 150–450)
RBC # BLD AUTO: 3.79 10E6/UL (ref 3.8–5.2)
WBC # BLD AUTO: 5.4 10E3/UL (ref 4–11)

## 2023-06-19 PROCEDURE — 85027 COMPLETE CBC AUTOMATED: CPT | Performed by: NURSE PRACTITIONER

## 2023-06-19 PROCEDURE — 99214 OFFICE O/P EST MOD 30 MIN: CPT | Performed by: NURSE PRACTITIONER

## 2023-06-19 PROCEDURE — 36415 COLL VENOUS BLD VENIPUNCTURE: CPT | Performed by: NURSE PRACTITIONER

## 2023-06-19 PROCEDURE — 99215 OFFICE O/P EST HI 40 MIN: CPT | Performed by: PSYCHIATRY & NEUROLOGY

## 2023-06-19 RX ORDER — TRIAMCINOLONE ACETONIDE 1 MG/G
OINTMENT TOPICAL
COMMUNITY
Start: 2022-11-14

## 2023-06-19 RX ORDER — LOSARTAN POTASSIUM 100 MG/1
100 TABLET ORAL DAILY
Qty: 30 TABLET | Refills: 3 | Status: SHIPPED | OUTPATIENT
Start: 2023-06-19 | End: 2023-10-13

## 2023-06-19 NOTE — NURSING NOTE
Chief Complaint   Patient presents with     CVA     6 month follow up      Annika Wan CMA on 6/19/2023 at 9:32 AM

## 2023-06-19 NOTE — LETTER
2023         RE: Neena Castellano  [address]  [place] [address]        Dear Colleague,    Thank you for referring your patient, Neena Castellano, to the [facility] [place]. Please see a copy of my visit note below.    NEUROLOGY FOLLOW UP VISIT  NOTE       [facility] [place]  [address] [place] [address]  Tel: [phone]  Fax: [phone]  [email or web address]     Neena Castellano,  1940, MRN [id number]  PCP: Bridget Lopez  Date: 2023      ASSESSMENT & PLAN     Visit Diagnosis  1. H/O Lt M2 embolic stroke     Left M2 embolic infarction  82-year-old female with history of HTN, GERD, history of cigarette smoking in the past was admitted to the hospital with left M2 infarction.  Thrombectomy was attempted but it was unsuccessful.  Since then she has made quite remarkable improvement and has minimal speech difficulty.  I have recommended:    1.  Continue aspirin and Lipitor with goal of LDL less than 70  2.  Keep systolic blood pressure 120-130/80-90  3.  Continue speech and physical therapy  4.  Mediterranean diet  5.  Follow-up will be on as needed basis    Thank you again for this referral, please feel free to contact me if you have any questions.    Lee Roach MD  [facility]  (Formerly, [facility]     HISTORY OF PRESENT ILLNESS     Patient is a 82-year-old female with history of HTN, GERD who was admitted to the hospital with left M2 occlusion who returns for follow-up.  She was hospitalized at [facility] in 2022 with speech difficulty.  She had a CT of the head and CTA that showed left MCA early ischemic changes.  CTA confirmed M2 occlusion and there was a mismatch on CT perfusion.  She had thrombectomy attempted but it was not successful and afterwards MRI scan showed left MCA ischemic infarct.  Her LDL was 79 and was started on Plavix and  subsequently switched to aspirin and also continued on Lipitor.  After discharge she went through physical therapy.  She also had cardiac monitor that did not show any atrial fibrillation.  She was told to continue with speech and physical therapy.  She feels her speech has improved.  Although she still struggles occasionally with naming objects.     PROBLEM LIST   Patient Active Problem List   Diagnosis Code     Atrophic vaginitis N95.2     GERD (gastroesophageal reflux disease) K21.9     HTN (hypertension) I10     Mild intermittent asthma J45.20     AR (allergic rhinitis) J30.9     Anxiety - Panic attacks and OCD features F41.9     OA (osteoarthritis) M19.90     ETD (eustachian tube dysfunction) H69.80     Former smoker Z87.891     DNR (do not resuscitate) Z66     H/O Lt M2 embolic stroke Z86.73     Sensorineural hearing loss (SNHL) of both ears H90.3         PAST MEDICAL & SURGICAL HISTORY     Past Medical History:   Patient  has no past medical history on file.    Surgical History:  She  has a past surgical history that includes REPAIR OF HAMMERTOE,ONE (Right, 11/7/2014); Bunionectomy (Right, 11/7/2014); Hysterectomy (1993); Oophorectomy (1993); Cataract Extraction, Bilateral; Biopsy breast (Left); Colonoscopy (2012); and IR Carotid Cerebral Angiogram Bilateral (11/1/2022).     SOCIAL HISTORY     Reviewed, and she  reports that she quit smoking about 34 years ago. Her smoking use included cigarettes. She has never used smokeless tobacco. She reports that she does not currently use alcohol.     FAMILY HISTORY     Reviewed, and family history includes Myocardial Infarction in her father; Ovarian Cancer (age of onset: 83) in her mother.     ALLERGIES     Allergies   Allergen Reactions     Codeine Nausea and Vomiting     Hydrocodone-Acetaminophen Nausea     Unknown on which vicodin        Levofloxacin Muscle Pain (Myalgia) and Other (See Comments)     Joint pain  Joint pain  Shoulder pain       Sulfa Antibiotics  Itching     Levothyroxine      Other reaction(s): Arthralgia     Methotrexate      Other reaction(s): *Unknown - Pt Doesn't Remember  Patient and family do not recall this allergy       Tetanus-Diphtheria Toxoids Td      Other reaction(s): Edema  Horse serum tetanus.       Tetracyclines & Related Unknown         REVIEW OF SYSTEMS     A 12 point review of system was performed and was negative except as outlined in the history of present illness.     HOME MEDICATIONS     Current Outpatient Rx   Medication Sig Dispense Refill     Aspirin 81 MG CAPS Take by mouth daily       atorvastatin (LIPITOR) 20 MG tablet Take 1 tablet (20 mg) by mouth every evening 30 tablet 0     calcium citrate-vitamin D (CITRACAL) 315-200 MG-UNIT TABS per tablet Take 2 tablets by mouth 2 times daily       Cholecalciferol (VITAMIN D3) 25 MCG (1000 UT) CAPS        coenzyme Q-10 200 MG CAPS Take 200 mg by mouth daily       cycloSPORINE (RESTASIS) 0.05 % ophthalmic emulsion Apply 1 drop to eye 2 times daily as needed for dry eyes       DULoxetine (CYMBALTA) 20 MG capsule Take 40 mg by mouth daily       estradiol (ESTRACE) 0.1 MG/GM vaginal cream Place 2 g vaginally twice a week Once or twice weekly       famotidine (PEPCID) 20 MG tablet Take 1 tablet (20 mg) by mouth 2 times daily 60 tablet 0     hydrocortisone (CORTAID) 1 % external cream Apply topically 2 times daily as needed for rash       ipratropium (ATROVENT) 0.03 % nasal spray Spray 2 sprays in nostril daily as needed       ketoconazole (NIZORAL) 2 % external cream Apply topically 2 times daily as needed for itching       losartan (COZAAR) 50 MG tablet Take 1.5 tablets (75 mg) by mouth daily 90 tablet 3     metoprolol succinate ER (TOPROL XL) 25 MG 24 hr tablet Take 0.5 tablets (12.5 mg) by mouth daily 60 tablet 2     Multiple Vitamins-Minerals (EYE VITAMINS PO) Take by mouth 2 times daily       multivitamin (CENTRUM SILVER) tablet Take 1 tablet by mouth daily       polyethylene  "glycol-propylene glycol (SYSTANE ULTRA) 0.4-0.3 % SOLN ophthalmic solution Place 1 drop into both eyes every hour as needed for dry eyes       albuterol (PROAIR HFA/PROVENTIL HFA/VENTOLIN HFA) 108 (90 Base) MCG/ACT inhaler Inhale 1-2 puffs into the lungs every 4 hours as needed       bimatoprost (LATISSE) 0.03 % external opthalmic solution Apply 1 drop topically nightly as needed (apply to eye lashes)       cetirizine (ZYRTEC) 10 MG tablet Take 10 mg by mouth daily as needed       clobetasol (TEMOVATE) 0.05 % external ointment Apply topically 2 times daily as needed (rash)           PHYSICAL EXAM     Vital signs  BP (!) 143/76 (BP Location: Left arm, Patient Position: Sitting)   Pulse 60   Ht 1.626 m (5' 4\")   Wt 53.1 kg (117 lb)   BMI 20.08 kg/m      Weight:   117 lbs 0 oz    Elderly female who is alert and oriented vital signs were reviewed and documented in electronic medical record.  Neck supple.  Neurologically she has difficulty with expressing herself at times and naming objects.  Comprehension is intact.  She has decreased hearing and uses hearing aids.  Rest of the cranial nerves are intact.  Motor strength 5/5.  Reflexes 2+ she has minimal dysmetria on finger-nose testing gait normal.     PERTINENT DIAGNOSTIC STUDIES     Following studies were reviewed:     CTA HEAD AND NECK 2022  CTA HEAD:  1. Occlusion of the dominant inferior division M2 branch of the left  middle cerebral artery.  2. No other high-grade stenosis or large vessel occlusion identified.     CTA NECK:  1. Mild bilateral carotid bifurcation atherosclerosis without  significant stenosis.      CT PERFUSION 11/1/2022  Findings consistent with a small core infarct in the left  middle artery territory centered in the lateral left temporal region,  with at least moderate surrounding presumed ischemic penumbra. Per the  RAPID perfusion maps, the volume of the core infarct (CBF less than  30%) = 7 mL, and the volume of the presumed ischemic " penumbra = 48 mL,with mismatch ratio 7.9.    MRI BRAIN 11/1/2022  1. Subacute ischemia posterior left insular cortex and mid left temporal lobe with petechial hemorrhage.  2. Acute ischemia posterior left frontal to left parietal lobes along with small foci within the anterior frontal lobes bilaterally, left posterior parietal lobe along with left thalamus.  3. Mild diffuse age related changes.    CEREBRAL ANGIOGRAM 11/1/2022  Unsuccessful mechanical thrombectomy of the left middle cerebral  artery M1 occlusion, with distal migration of thrombus and TICI 0  recanalization     PERTINENT LABS  Following labs were reviewed:  No visits with results within 3 Month(s) from this visit.   Latest known visit with results is:   Admission on 11/01/2022, Discharged on 11/05/2022   Component Date Value     Sodium 11/01/2022 136      Potassium 11/01/2022 4.4      Chloride 11/01/2022 100      Carbon Dioxide (CO2) 11/01/2022 24      Anion Gap 11/01/2022 12      Urea Nitrogen 11/01/2022 27      Creatinine 11/01/2022 0.94      Calcium 11/01/2022 9.9      Glucose 11/01/2022 106 (H)      GFR Estimate 11/01/2022 60 (L)      INR 11/01/2022 1.01      aPTT 11/01/2022 24      Troponin I High Sensitiv* 11/01/2022 8      Ventricular Rate 11/01/2022 51      Atrial Rate 11/01/2022 51      DE Interval 11/01/2022 182      QRS Duration 11/01/2022 84      QT 11/01/2022 462      QTc 11/01/2022 425      P Bryant 11/01/2022 62      R AXIS 11/01/2022 35      T Bryant 11/01/2022 63      Interpretation ECG 11/01/2022                      Value:Sinus bradycardia with Premature atrial complexes in a pattern of bigeminy  Otherwise normal ECG  No previous ECGs available  Confirmed by GENERATED REPORT, COMPUTER ([id number]),  Glenna Gutierrez ([id number]) on 11/1/2022 4:39:45 PM       WBC Count 11/01/2022 6.5      RBC Count 11/01/2022 4.70      Hemoglobin 11/01/2022 14.4      Hematocrit 11/01/2022 44.7      MCV 11/01/2022 95      MCH 11/01/2022 30.6      MCHC  11/01/2022 32.2      RDW 11/01/2022 13.2      Platelet Count 11/01/2022 280      % Neutrophils 11/01/2022 76      % Lymphocytes 11/01/2022 17      % Monocytes 11/01/2022 7      % Eosinophils 11/01/2022 0      % Basophils 11/01/2022 0      % Immature Granulocytes 11/01/2022 0      NRBCs per 100 WBC 11/01/2022 0      Absolute Neutrophils 11/01/2022 4.9      Absolute Lymphocytes 11/01/2022 1.1      Absolute Monocytes 11/01/2022 0.5      Absolute Eosinophils 11/01/2022 0.0      Absolute Basophils 11/01/2022 0.0      Absolute Immature Granul* 11/01/2022 0.0      Absolute NRBCs 11/01/2022 0.0      Hemoglobin A1C 11/01/2022 5.9 (H)      Cholesterol 11/01/2022 164      Triglycerides 11/01/2022 162 (H)      Direct Measure HDL 11/01/2022 53      LDL Cholesterol Calculat* 11/01/2022 79      Non HDL Cholesterol 11/01/2022 111      LVEF  11/02/2022 60-65%      GLUCOSE BY METER POCT 11/01/2022 84      GLUCOSE BY METER POCT 11/01/2022 102 (H)      WBC Count 11/02/2022 7.1      RBC Count 11/02/2022 3.69 (L)      Hemoglobin 11/02/2022 11.7      Hematocrit 11/02/2022 34.1 (L)      MCV 11/02/2022 92      MCH 11/02/2022 31.7      MCHC 11/02/2022 34.3      RDW 11/02/2022 13.5      Platelet Count 11/02/2022 207      Sodium 11/02/2022 138      Potassium 11/02/2022 4.0      Chloride 11/02/2022 106      Carbon Dioxide (CO2) 11/02/2022 27      Anion Gap 11/02/2022 5      Urea Nitrogen 11/02/2022 21      Creatinine 11/02/2022 0.72      Calcium 11/02/2022 8.6      Glucose 11/02/2022 87      GFR Estimate 11/02/2022 83      INR 11/02/2022 1.12      aPTT 11/02/2022 26      GLUCOSE BY METER POCT 11/02/2022 86      CRP Inflammation 11/02/2022 6.1      Ferritin 11/02/2022 171      GLUCOSE BY METER POCT 11/02/2022 110 (H)      WBC Count 11/03/2022 6.9      RBC Count 11/03/2022 3.74 (L)      Hemoglobin 11/03/2022 11.8      Hematocrit 11/03/2022 33.7 (L)      MCV 11/03/2022 90      MCH 11/03/2022 31.6      MCHC 11/03/2022 35.0      RDW 11/03/2022 13.0       Platelet Count 11/03/2022 236      Sodium 11/03/2022 138      Potassium 11/03/2022 4.0      Chloride 11/03/2022 108      Carbon Dioxide (CO2) 11/03/2022 24      Anion Gap 11/03/2022 6      Urea Nitrogen 11/03/2022 12      Creatinine 11/03/2022 0.58      Calcium 11/03/2022 8.7      Glucose 11/03/2022 107 (H)      GFR Estimate 11/03/2022 90      Creatinine 11/05/2022 0.68      GFR Estimate 11/05/2022 86      Platelet Count 11/05/2022 304          Total time spent for face to face visit, reviewing labs/imaging studies, counseling and coordination of care was: 45 Minutes spent on the date of the encounter doing chart review, review of outside records, review of test results, interpretation of tests, patient visit, documentation and discussion with family       This note was dictated using voice recognition software.  Any grammatical or context distortions are unintentional and inherent to the software.    No orders of the defined types were placed in this encounter.     New Prescriptions    No medications on file     Modified Medications    No medications on file                     Again, thank you for allowing me to participate in the care of your patient.        Sincerely,        Lee Roach MD

## 2023-06-19 NOTE — PROGRESS NOTES
Cardiology Clinic Progress Note  Neena Castellano MRN# [id number]   YOB: 1940 Age: 82 year old   Primary Cardiologist:Dr. Bell Reason for visit: 2 month follow up             Assessment and Plan:       1.  Hypertension   -Daytime fatigue improved with reduction in metoprolol and higher pulse rates  -Her blood pressure continues to be suboptimally controlled, will increase losartan to 100mg daily and continue her metoprolol XL at 12.5mg daily     2.  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  Dyslipidemia, controlled   -Continue Lipitor 20 mg daily  -December 2022 LDL 79    4.  Bradycardia  -Log of pulses from home reviewed and now primarily in the high 50-low 60's     5. Spontaneous bruising  -Will check CBC today with extended duration of bruising and ongoing aspirin use to look at hemoglobin and platelet levels   -Advised she follow up with her PCP     Changes today: Increase losartan to 100mg daily, CBC today     Follow up plan: Follow-up with me in 2 months         History of Presenting Illness:    Neena Castellano is a very pleasant 82 year old female with a history of CVA secondary to acute left MCA M2 occlusion for which she underwent unsuccessful mechanical thrombectomy (11/2022), residual aphasia, bradycardia, hypertension, mild intermittent asthma, anxiety, history of eustachian dysfunction, former smoker, dyslipidemia, osteoarthritis, GERD.    Patient was seen by Dr. Bell in December 2022 following her hospitalization for her CVA.  During that hospitalization she had an echocardiogram which showed normal left and right ventricular function with LVEF 60 to 65%.  A 30-day Zio patch monitor which did not show any atrial fibrillation.  Her baseline rhythm was sinus bradycardia with a heart rate of 57, first-degree AV block, with occasional PACs.  Some of her heart rates  did go into the 40s.  During her clinic visit they discussed her fatigue as well as bradycardia and reduced her dose of metoprolol to 25 mg daily. They also started losartan, which was subsequently increased to 50mg daily following review of several home blood pressure logs.     When I met Lynsey in April of 2023, she continued to feel fatigued, despite reduction in her metoprolol. We reduced the dose further to 12.5mg daily and increased her losartan to 75mg daily     Patient is here today for follow-up of her hypertension. She is very hard of hearing and her daughter is present with her today.  She continues to feel fatigued, however is has gradually improved. She is not napping during the day and sleeps well at night. She has been working with speech and occupational therapy and made significant improvements. She saw neurology earlier today who recommended follow up on an as needed basis.     At night, when she lies down to sleep, she will intermittently feel a few palpitations. She has no associated symptoms during these times and are not significantly bothersome to her. They resolve after a few beats.     She has had a large area of bruising with surrounding petechiae on the back of her neck for the last month with no known trauma to the area. Non-tender without hematoma. Denies any other bleeding issues.     Patient denies chest pain or chest tightness. Denies dizziness, lightheadedness or other presyncopal symptoms.  Denies shortness of breath, with apnea, or PND.     Blood pressure 167/77 and HR 65 in clinic today. Home log of blood pressures primarily in systolic 140 range/80's diastolic.         Recent Hospitalizations   November 2022 embolic CVA        Social History      Social History     Socioeconomic History     Marital status:      Spouse name: Not on file     Number of children: Not on file     Years of education: Not on file     Highest education level: Not on file   Occupational History      "Not on file   Tobacco Use     Smoking status: Former     Types: Cigarettes     Quit date: 1989     Years since quittin.3     Smokeless tobacco: Never   Vaping Use     Vaping status: Not on file   Substance and Sexual Activity     Alcohol use: Not Currently     Drug use: Not on file     Sexual activity: Not on file   Other Topics Concern     Not on file   Social History Narrative     Not on file     Social Determinants of Health     Financial Resource Strain: Not on file   Food Insecurity: Not on file   Transportation Needs: Not on file   Physical Activity: Not on file   Stress: Not on file   Social Connections: Not on file   Intimate Partner Violence: Not on file   Housing Stability: Not on file            Review of Systems:   Skin:  not assessed     Eyes:  not assessed    ENT:  not assessed    Respiratory:  Positive for sleep apnea  Cardiovascular:  Negative;palpitations;chest pain;lightheadedness;dizziness;fatigue;edema fatigue;Positive for  Gastroenterology: not assessed    Genitourinary:  not assessed    Musculoskeletal:  not assessed    Neurologic:  Positive for stroke;speech problems  Psychiatric:  not assessed    Heme/Lymph/Imm:  not assessed    Endocrine:  not assessed           Physical Exam:   Vitals: BP (!) 167/77   Pulse 65   Ht 1.607 m (5' 3.25\")   Wt 53.8 kg (118 lb 8 oz)   SpO2 100%   BMI 20.83 kg/m     Wt Readings from Last 4 Encounters:   23 53.8 kg (118 lb 8 oz)   23 53.1 kg (117 lb)   23 53.6 kg (118 lb 1.6 oz)   22 53.4 kg (117 lb 12.8 oz)     GEN: well nourished, in no acute distress.  HEENT:  Pupils equal, round. Sclerae nonicteric. Bilateral hearing aids, [place]  NECK: Supple, no masses appreciated.  C/V:  Regular rate and rhythm, no rub or gallop.  I/VI apical systolic murmur  RESP: Respirations are unlabored. Clear to auscultation bilaterally without wheezing, rales, or rhonchi.  GI: Abdomen soft, nontender.  EXTREM: No LE edema.  NEURO: Alert and " oriented, cooperative. Intermittent asphasia  SKIN: Warm and dry. Scattered large area of flat bruising with surrounding petechiae on posterior neck        Data:     LIPID RESULTS:  Lab Results   Component Value Date    CHOL 164 11/01/2022    HDL 53 11/01/2022    LDL 79 11/01/2022    TRIG 162 (H) 11/01/2022     LIVER ENZYME RESULTS:  Lab Results   Component Value Date    AST 37 09/16/2019    ALT 24 09/16/2019     CBC RESULTS:  Lab Results   Component Value Date    WBC 6.9 11/03/2022    RBC 3.74 (L) 11/03/2022    HGB 11.8 11/03/2022    HCT 33.7 (L) 11/03/2022    MCV 90 11/03/2022    MCH 31.6 11/03/2022    MCHC 35.0 11/03/2022    RDW 13.0 11/03/2022     11/05/2022     BMP RESULTS:  Lab Results   Component Value Date     11/03/2022    POTASSIUM 4.0 11/03/2022    CHLORIDE 108 11/03/2022    CO2 24 11/03/2022    ANIONGAP 6 11/03/2022     (H) 11/03/2022    BUN 12 11/03/2022    CR 0.68 11/05/2022    GFRESTIMATED 86 11/05/2022    GFRESTIMATED >60 09/16/2019    GFRESTBLACK >60 09/16/2019    JAMES 8.7 11/03/2022      A1C RESULTS:  Lab Results   Component Value Date    A1C 5.9 (H) 11/01/2022     INR RESULTS:  Lab Results   Component Value Date    INR 1.12 11/02/2022    INR 1.01 11/01/2022            Medications     Current Outpatient Medications   Medication Sig Dispense Refill     albuterol (PROAIR HFA/PROVENTIL HFA/VENTOLIN HFA) 108 (90 Base) MCG/ACT inhaler Inhale 1-2 puffs into the lungs every 4 hours as needed       Aspirin 81 MG CAPS Take by mouth daily       atorvastatin (LIPITOR) 20 MG tablet Take 1 tablet (20 mg) by mouth every evening 30 tablet 0     bimatoprost (LATISSE) 0.03 % external opthalmic solution Apply 1 drop topically nightly as needed (apply to eye lashes)       calcium citrate-vitamin D (CITRACAL) 315-200 MG-UNIT TABS per tablet Take 2 tablets by mouth 2 times daily       Cholecalciferol (VITAMIN D3) 25 MCG (1000 UT) CAPS        clobetasol (TEMOVATE) 0.05 % external ointment Apply  topically 2 times daily as needed (rash)       coenzyme Q-10 200 MG CAPS Take 200 mg by mouth daily       cycloSPORINE (RESTASIS) 0.05 % ophthalmic emulsion Apply 1 drop to eye 2 times daily as needed for dry eyes       DULoxetine (CYMBALTA) 20 MG capsule Take 60 mg by mouth daily       estradiol (ESTRACE) 0.1 MG/GM vaginal cream Place 2 g vaginally twice a week Once or twice weekly       famotidine (PEPCID) 20 MG tablet Take 1 tablet (20 mg) by mouth 2 times daily 60 tablet 0     hydrocortisone (CORTAID) 1 % external cream Apply topically 2 times daily as needed for rash       losartan (COZAAR) 100 MG tablet Take 1 tablet (100 mg) by mouth daily 30 tablet 3     metoprolol succinate ER (TOPROL XL) 25 MG 24 hr tablet Take 0.5 tablets (12.5 mg) by mouth daily 60 tablet 2     Multiple Vitamins-Minerals (EYE VITAMINS PO) Take by mouth 2 times daily       multivitamin (CENTRUM SILVER) tablet Take 1 tablet by mouth daily       polyethylene glycol-propylene glycol (SYSTANE ULTRA) 0.4-0.3 % SOLN ophthalmic solution Place 1 drop into both eyes every hour as needed for dry eyes       triamcinolone (KENALOG) 0.1 % external ointment APPLY TOPICALLY TO AFFECTED AREA(S) THREE TIMES DAILY.       ipratropium (ATROVENT) 0.03 % nasal spray Spray 2 sprays in nostril daily as needed (Patient not taking: Reported on 6/19/2023)       ketoconazole (NIZORAL) 2 % external cream Apply topically 2 times daily as needed for itching (Patient not taking: Reported on 6/19/2023)            Past Medical History   No past medical history on file.  Past Surgical History:   Procedure Laterality Date     BIOPSY BREAST Left     benign     BUNIONECTOMY Right 11/7/2014    Procedure: RIGHT 1ST METATARSAL PHALANGEAL JOINT FUSION ;  Surgeon: Washington Blue DPM;  Location: [place] [facility];  Service:      CATARACT EXTRACTION, BILATERAL       COLONOSCOPY  2012     HC REPAIR OF HAMMERTOE,ONE Right 11/7/2014    Procedure: RIGHT 2ND HAMMERTOE CORRECTION;  Surgeon:  Washington Blue DPM;  Location: [place] [facility];  Service: Podiatry     HYSTERECTOMY  1993     IR CAROTID CEREBRAL ANGIOGRAM BILATERAL  11/1/2022     OOPHORECTOMY  1993     Family History   Problem Relation Age of Onset     Ovarian Cancer Mother 83     Myocardial Infarction Father      Hereditary Breast and Ovarian Cancer Syndrome No family hx of      Breast Cancer No family hx of      Cancer No family hx of      Colon Cancer No family hx of      Endometrial Cancer No family hx of             Allergies   Codeine, Hydrocodone-acetaminophen, Levofloxacin, Sulfa antibiotics, Levothyroxine, Methotrexate, Tetanus-diphtheria toxoids td, and Tetracyclines & related        Hui Ribeiro NP  [facility]  Pager: [phone]

## 2023-06-19 NOTE — LETTER
6/19/2023    Bridget Lopez MD  [address]  [facility] [address]    RE: Neena Castellano       Dear Colleague,     I had the pleasure of seeing Neena Castellano in the [facility].  Cardiology Clinic Progress Note  Neena Castellano MRN# [id number]   YOB: 1940 Age: 82 year old   Primary Cardiologist:Dr. Bell Reason for visit: 2 month follow up             Assessment and Plan:       1.  Hypertension   -Daytime fatigue improved with reduction in metoprolol and higher pulse rates  -Her blood pressure continues to be suboptimally controlled, will increase losartan to 100mg daily and continue her metoprolol XL at 12.5mg daily     2.  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  Dyslipidemia, controlled   -Continue Lipitor 20 mg daily  -December 2022 LDL 79    4.  Bradycardia  -Log of pulses from home reviewed and now primarily in the high 50-low 60's     5. Spontaneous bruising  -Will check CBC today with extended duration of bruising and ongoing aspirin use to look at hemoglobin and platelet levels   -Advised she follow up with her PCP     Changes today: Increase losartan to 100mg daily, CBC today     Follow up plan: Follow-up with me in 2 months         History of Presenting Illness:    Neena Castellano is a very pleasant 82 year old female with a history of CVA secondary to acute left MCA M2 occlusion for which she underwent unsuccessful mechanical thrombectomy (11/2022), residual aphasia, bradycardia, hypertension, mild intermittent asthma, anxiety, history of eustachian dysfunction, former smoker, dyslipidemia, osteoarthritis, GERD.    Patient was seen by Dr. Bell in December 2022 following her hospitalization for her CVA.  During that hospitalization she had an echocardiogram which showed normal left and right ventricular function with LVEF 60 to  65%.  A 30-day Zio patch monitor which did not show any atrial fibrillation.  Her baseline rhythm was sinus bradycardia with a heart rate of 57, first-degree AV block, with occasional PACs.  Some of her heart rates did go into the 40s.  During her clinic visit they discussed her fatigue as well as bradycardia and reduced her dose of metoprolol to 25 mg daily. They also started losartan, which was subsequently increased to 50mg daily following review of several home blood pressure logs.     When I met Lynsey in April of 2023, she continued to feel fatigued, despite reduction in her metoprolol. We reduced the dose further to 12.5mg daily and increased her losartan to 75mg daily     Patient is here today for follow-up of her hypertension. She is very hard of hearing and her daughter is present with her today.  She continues to feel fatigued, however is has gradually improved. She is not napping during the day and sleeps well at night. She has been working with speech and occupational therapy and made significant improvements. She saw neurology earlier today who recommended follow up on an as needed basis.     At night, when she lies down to sleep, she will intermittently feel a few palpitations. She has no associated symptoms during these times and are not significantly bothersome to her. They resolve after a few beats.     She has had a large area of bruising with surrounding petechiae on the back of her neck for the last month with no known trauma to the area. Non-tender without hematoma. Denies any other bleeding issues.     Patient denies chest pain or chest tightness. Denies dizziness, lightheadedness or other presyncopal symptoms.  Denies shortness of breath, with apnea, or PND.     Blood pressure 167/77 and HR 65 in clinic today. Home log of blood pressures primarily in systolic 140 range/80's diastolic.         Recent Hospitalizations   November 2022 embolic CVA        Social History      Social History  "    Socioeconomic History    Marital status:      Spouse name: Not on file    Number of children: Not on file    Years of education: Not on file    Highest education level: Not on file   Occupational History    Not on file   Tobacco Use    Smoking status: Former     Types: Cigarettes     Quit date: 1989     Years since quittin.3    Smokeless tobacco: Never   Vaping Use    Vaping status: Not on file   Substance and Sexual Activity    Alcohol use: Not Currently    Drug use: Not on file    Sexual activity: Not on file   Other Topics Concern    Not on file   Social History Narrative    Not on file     Social Determinants of Health     Financial Resource Strain: Not on file   Food Insecurity: Not on file   Transportation Needs: Not on file   Physical Activity: Not on file   Stress: Not on file   Social Connections: Not on file   Intimate Partner Violence: Not on file   Housing Stability: Not on file            Review of Systems:   Skin:  not assessed     Eyes:  not assessed    ENT:  not assessed    Respiratory:  Positive for sleep apnea  Cardiovascular:  Negative;palpitations;chest pain;lightheadedness;dizziness;fatigue;edema fatigue;Positive for  Gastroenterology: not assessed    Genitourinary:  not assessed    Musculoskeletal:  not assessed    Neurologic:  Positive for stroke;speech problems  Psychiatric:  not assessed    Heme/Lymph/Imm:  not assessed    Endocrine:  not assessed           Physical Exam:   Vitals: BP (!) 167/77   Pulse 65   Ht 1.607 m (5' 3.25\")   Wt 53.8 kg (118 lb 8 oz)   SpO2 100%   BMI 20.83 kg/m     Wt Readings from Last 4 Encounters:   23 53.8 kg (118 lb 8 oz)   23 53.1 kg (117 lb)   23 53.6 kg (118 lb 1.6 oz)   22 53.4 kg (117 lb 12.8 oz)     GEN: well nourished, in no acute distress.  HEENT:  Pupils equal, round. Sclerae nonicteric. Bilateral hearing aids, [place]  NECK: Supple, no masses appreciated.  C/V:  Regular rate and rhythm, no rub or gallop.  " I/VI apical systolic murmur  RESP: Respirations are unlabored. Clear to auscultation bilaterally without wheezing, rales, or rhonchi.  GI: Abdomen soft, nontender.  EXTREM: No LE edema.  NEURO: Alert and oriented, cooperative. Intermittent asphasia  SKIN: Warm and dry. Scattered large area of flat bruising with surrounding petechiae on posterior neck        Data:     LIPID RESULTS:  Lab Results   Component Value Date    CHOL 164 11/01/2022    HDL 53 11/01/2022    LDL 79 11/01/2022    TRIG 162 (H) 11/01/2022     LIVER ENZYME RESULTS:  Lab Results   Component Value Date    AST 37 09/16/2019    ALT 24 09/16/2019     CBC RESULTS:  Lab Results   Component Value Date    WBC 6.9 11/03/2022    RBC 3.74 (L) 11/03/2022    HGB 11.8 11/03/2022    HCT 33.7 (L) 11/03/2022    MCV 90 11/03/2022    MCH 31.6 11/03/2022    MCHC 35.0 11/03/2022    RDW 13.0 11/03/2022     11/05/2022     BMP RESULTS:  Lab Results   Component Value Date     11/03/2022    POTASSIUM 4.0 11/03/2022    CHLORIDE 108 11/03/2022    CO2 24 11/03/2022    ANIONGAP 6 11/03/2022     (H) 11/03/2022    BUN 12 11/03/2022    CR 0.68 11/05/2022    GFRESTIMATED 86 11/05/2022    GFRESTIMATED >60 09/16/2019    GFRESTBLACK >60 09/16/2019    JAMES 8.7 11/03/2022      A1C RESULTS:  Lab Results   Component Value Date    A1C 5.9 (H) 11/01/2022     INR RESULTS:  Lab Results   Component Value Date    INR 1.12 11/02/2022    INR 1.01 11/01/2022            Medications     Current Outpatient Medications   Medication Sig Dispense Refill    albuterol (PROAIR HFA/PROVENTIL HFA/VENTOLIN HFA) 108 (90 Base) MCG/ACT inhaler Inhale 1-2 puffs into the lungs every 4 hours as needed      Aspirin 81 MG CAPS Take by mouth daily      atorvastatin (LIPITOR) 20 MG tablet Take 1 tablet (20 mg) by mouth every evening 30 tablet 0    bimatoprost (LATISSE) 0.03 % external opthalmic solution Apply 1 drop topically nightly as needed (apply to eye lashes)      calcium citrate-vitamin D  (CITRACAL) 315-200 MG-UNIT TABS per tablet Take 2 tablets by mouth 2 times daily      Cholecalciferol (VITAMIN D3) 25 MCG (1000 UT) CAPS       clobetasol (TEMOVATE) 0.05 % external ointment Apply topically 2 times daily as needed (rash)      coenzyme Q-10 200 MG CAPS Take 200 mg by mouth daily      cycloSPORINE (RESTASIS) 0.05 % ophthalmic emulsion Apply 1 drop to eye 2 times daily as needed for dry eyes      DULoxetine (CYMBALTA) 20 MG capsule Take 60 mg by mouth daily      estradiol (ESTRACE) 0.1 MG/GM vaginal cream Place 2 g vaginally twice a week Once or twice weekly      famotidine (PEPCID) 20 MG tablet Take 1 tablet (20 mg) by mouth 2 times daily 60 tablet 0    hydrocortisone (CORTAID) 1 % external cream Apply topically 2 times daily as needed for rash      losartan (COZAAR) 100 MG tablet Take 1 tablet (100 mg) by mouth daily 30 tablet 3    metoprolol succinate ER (TOPROL XL) 25 MG 24 hr tablet Take 0.5 tablets (12.5 mg) by mouth daily 60 tablet 2    Multiple Vitamins-Minerals (EYE VITAMINS PO) Take by mouth 2 times daily      multivitamin (CENTRUM SILVER) tablet Take 1 tablet by mouth daily      polyethylene glycol-propylene glycol (SYSTANE ULTRA) 0.4-0.3 % SOLN ophthalmic solution Place 1 drop into both eyes every hour as needed for dry eyes      triamcinolone (KENALOG) 0.1 % external ointment APPLY TOPICALLY TO AFFECTED AREA(S) THREE TIMES DAILY.      ipratropium (ATROVENT) 0.03 % nasal spray Spray 2 sprays in nostril daily as needed (Patient not taking: Reported on 6/19/2023)      ketoconazole (NIZORAL) 2 % external cream Apply topically 2 times daily as needed for itching (Patient not taking: Reported on 6/19/2023)            Past Medical History   No past medical history on file.  Past Surgical History:   Procedure Laterality Date    BIOPSY BREAST Left     benign    BUNIONECTOMY Right 11/7/2014    Procedure: RIGHT 1ST METATARSAL PHALANGEAL JOINT FUSION ;  Surgeon: Washington Blue DPM;  Location: [place]  Main OR;  Service:     CATARACT EXTRACTION, BILATERAL      COLONOSCOPY  2012    HC REPAIR OF KATE,ONE Right 11/7/2014    Procedure: RIGHT 2ND HAMMERTOE CORRECTION;  Surgeon: Washington Blue DPM;  Location: [facility];  Service: Podiatry    HYSTERECTOMY  1993    IR CAROTID CEREBRAL ANGIOGRAM BILATERAL  11/1/2022    OOPHORECTOMY  1993     Family History   Problem Relation Age of Onset    Ovarian Cancer Mother 83    Myocardial Infarction Father     Hereditary Breast and Ovarian Cancer Syndrome No family hx of     Breast Cancer No family hx of     Cancer No family hx of     Colon Cancer No family hx of     Endometrial Cancer No family hx of             Allergies   Codeine, Hydrocodone-acetaminophen, Levofloxacin, Sulfa antibiotics, Levothyroxine, Methotrexate, Tetanus-diphtheria toxoids td, and Tetracyclines & related        Hui Ribeiro NP  [facility]  Pager: [phone]        Thank you for allowing me to participate in the care of your patient.      Sincerely,     Hui Ribeiro NP     [facility]  cc:   Hui Ribeiro NP  [address] [place] [address]

## 2023-06-19 NOTE — PATIENT INSTRUCTIONS
Today's Recommendations    I would like you to increase your losartan to 100mg daily  Our goal for your blood pressure is 120-130 on the top number  We will check your blood work to see what your hemoglobin and platelets are  Continue all medications without changes.  Please follow up with me in 2 months.    Please send a [employer] message or call [phone] to the RN team with questions or concerns.     Scheduling number [phone]  JIAN London, CNP

## 2023-07-14 ENCOUNTER — TELEPHONE (OUTPATIENT)
Dept: CARDIOLOGY | Facility: CLINIC | Age: 83
End: 2023-07-14
Payer: COMMERCIAL

## 2023-07-14 NOTE — TELEPHONE ENCOUNTER
Recommendations sent in a [employer] message as requested with request to call back with questions or concerns.  CLEMENTE Torres RN

## 2023-07-14 NOTE — TELEPHONE ENCOUNTER
Neena's son, Remy, left a voice message on Team 1 RN with the following blood pressure and heart rate readings:    6/20: 157/69, heart rate 60  6/21: 142/69, heart rate 60  6/26: 153/69, heart rate 56 and 145/69, heart rate 56  7/1: 136/69, heart rate 55  7/6: 129/69, heart rate 59  7/10: 152/72, heart rate 56 and 146/73, heart rate 53    Pt has a follow-up visit with Radha Ribeiro on 8/28.  Routing to Radha Ribeiro for review and recommendations with copy to Team 5 RN so they may follow-up with Neena's son, Remy.  Remy stated he would prefer a [email or web address] message with recommendations.      6/19/23 /OV with Radha Ribeiro:  1.  Hypertension   -Daytime fatigue improved with reduction in metoprolol and higher pulse rates  -Her blood pressure continues to be suboptimally controlled, will increase losartan to 100mg daily and continue her metoprolol XL at 12.5mg daily      2.  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  Dyslipidemia, controlled   -Continue Lipitor 20 mg daily  -December 2022 LDL 79     4.  Bradycardia  -Log of pulses from home reviewed and now primarily in the high 50-low 60's

## 2023-07-14 NOTE — TELEPHONE ENCOUNTER
Her blood pressures from earlier this month look reasonably well controlled.  Heart rates look stable.  Lets have her continue her metoprolol and losartan at the current doses and I will recheck it when she comes in for her visit in 6 weeks and make further adjustments at that point.

## 2023-08-16 NOTE — TELEPHONE ENCOUNTER
Spoke with Fidel about Dr. Bell's response.   They will continue to monitor and if BP is consistently above 140 systolic then they will let us know next week.   Fidel is planning to send [employer] updates.    X Size Of Lesion In Cm (Optional): 0.8

## 2023-08-25 NOTE — PROGRESS NOTES
Cardiology Clinic Progress Note  Neena Castellano MRN# [id number]   YOB: 1940 Age: 82 year old   Primary Cardiologist:Dr. Bell Reason for visit: 2 month follow up             Assessment and Plan:     1.  Hypertension, uncontrolled  -Daytime fatigue continued despite reductions in metoprolol, will discontinue this  -Losartan titrated up to 100mg daily  -Start amlodipine 2.5 mg daily    2.  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  Dyslipidemia, controlled   -Continue Lipitor 20 mg daily  -December 2022 LDL 79    4.  Bradycardia  -Log of pulses from home reviewed and now primarily in the high 50-low 60's   -Will discontinue metoprolol and monitor her resting heart rate, possibly contributing to fatigue    5. Spontaneous bruising  -6/2023 Hgb stable at 11.7  -Continues to occur intermittently, advised she follow-up with her PCP    Patient is hypertensive today, however continues to take her losartan and metoprolol in the afternoon.  I advise she start taking her losartan in the morning and discontinue her metoprolol. It is possible that metoprolol is contributing to her ongoing fatigue. She continues to have relatively low heart rates in the 50s at rest, prior to her metoprolol.  I am also starting her on amlodipine for better hypertensive control and warned her about the side effects of dizziness, lightheadedness, and peripheral edema. I advised her to monitor for increased palpitations with stopping the metoprolol.  I will have her come in in 2 weeks for nurse blood pressure check at which point we will likely increase her amlodipine to 5 mg daily.    Changes today: Discontinue metoprolol, start amlodipine 2.5mg daily, start taking losartan in the morning    Follow up plan: Follow-up for a nurse blood pressure check in 2 weeks, Follow up with me in 2 months         History of  Presenting Illness:    Neena Castellano is a very pleasant 82 year old female with a history of CVA secondary to acute left MCA M2 occlusion for which she underwent unsuccessful mechanical thrombectomy (11/2022), residual aphasia, bradycardia, hypertension, mild intermittent asthma, anxiety, history of eustachian dysfunction, former smoker, dyslipidemia, osteoarthritis, GERD.    Patient was seen by Dr. Bell in December 2022 following her hospitalization for her CVA.  During that hospitalization she had an echocardiogram which showed normal left and right ventricular function with LVEF 60 to 65%.  A 30-day Zio patch monitor which did not show any atrial fibrillation.  Her baseline rhythm was sinus bradycardia with a heart rate of 57, first-degree AV block, with occasional PACs.  Some of her heart rates did go into the 40s.  During her clinic visit they discussed her fatigue as well as bradycardia and reduced her dose of metoprolol to 25 mg daily. They also started losartan, which was subsequently increased to 50mg daily following review of several home blood pressure logs.     When I met Lynsey in April of 2023, she continued to feel fatigued, despite reduction in her metoprolol. We reduced the dose further to 12.5mg daily and increased her losartan to 75mg daily     In June 2023 we increased her losartan to 100 mg daily.  We just checked a CBC due to ongoing fatigue and bruising which showed stable hemoglobin at 11.7 and platelet 203.    Patient is here today for follow-up of her hypertension. She is very hard of hearing and her daughter is present with her today.  She continues to feel fatigued, especially if she goes out of the house. Her energy level . She is not napping during the day and sleeps well at night. She walks half an hour on the treadmill and bikes half an hour every day.     At night, when she lies down to sleep, she will intermittently feel a few palpitations. She has no associated symptoms  during these times and are not significantly bothersome to her. They resolve after a few beats.     Patient denies chest pain or chest tightness. Denies dizziness, lightheadedness or other presyncopal symptoms.  Denies shortness of breath, with apnea, or PND.     Blood pressure 160/80 and HR 58 in clinic today.  She recently got a new home blood pressure cuff and readings have been running in the systolic 150-160 range.        Recent Hospitalizations   2022 embolic CVA        Social History      Social History     Socioeconomic History    Marital status:      Spouse name: Not on file    Number of children: Not on file    Years of education: Not on file    Highest education level: Not on file   Occupational History    Not on file   Tobacco Use    Smoking status: Former     Types: Cigarettes     Quit date: 1989     Years since quittin.5    Smokeless tobacco: Never   Substance and Sexual Activity    Alcohol use: Not Currently    Drug use: Not on file    Sexual activity: Not on file   Other Topics Concern    Not on file   Social History Narrative    Not on file     Social Determinants of Health     Financial Resource Strain: Not on file   Food Insecurity: Not on file   Transportation Needs: Not on file   Physical Activity: Not on file   Stress: Not on file   Social Connections: Not on file   Intimate Partner Violence: Not on file   Housing Stability: Not on file            Review of Systems:   Skin:        Eyes:       ENT:       Respiratory:  Negative for shortness of breath;dyspnea on exertion;cough;wheezing;sleep apnea;CPAP  Cardiovascular:  Negative for;chest pain;palpitations;edema;dizziness;lightheadedness;syncope or near-syncope;exercise intolerance fatigue;Positive for  Gastroenterology:      Genitourinary:       Musculoskeletal:  Positive for joint pain  Neurologic:       Psychiatric:       Heme/Lymph/Imm:       Endocrine:  Negative           Physical Exam:   Vitals: BP (!) 160/80    "Pulse 58   Ht 1.607 m (5' 3.25\")   Wt 54.4 kg (119 lb 14.4 oz)   SpO2 100%   BMI 21.07 kg/m     Wt Readings from Last 4 Encounters:   08/28/23 54.4 kg (119 lb 14.4 oz)   06/19/23 53.8 kg (118 lb 8 oz)   06/19/23 53.1 kg (117 lb)   04/12/23 53.6 kg (118 lb 1.6 oz)     GEN: well nourished, in no acute distress.  HEENT:  Pupils equal, round. Sclerae nonicteric. Bilateral hearing aids, [place]  NECK: Supple, no masses appreciated.  C/V:  Regular rate and rhythm, no rub or gallop.  I/VI apical systolic murmur  RESP: Respirations are unlabored. Clear to auscultation bilaterally without wheezing, rales, or rhonchi.  GI: Abdomen soft, nontender.  EXTREM: No LE edema.  NEURO: Alert and oriented, cooperative. Intermittent asphasia  SKIN: Warm and dry. Scattered large area of flat bruising with surrounding petechiae on posterior neck        Data:     LIPID RESULTS:  Lab Results   Component Value Date    CHOL 164 11/01/2022    HDL 53 11/01/2022    LDL 79 11/01/2022    TRIG 162 (H) 11/01/2022     LIVER ENZYME RESULTS:  Lab Results   Component Value Date    AST 37 09/16/2019    ALT 24 09/16/2019     CBC RESULTS:  Lab Results   Component Value Date    WBC 5.4 06/19/2023    RBC 3.79 (L) 06/19/2023    HGB 11.7 06/19/2023    HCT 36.7 06/19/2023    MCV 97 06/19/2023    MCH 30.9 06/19/2023    MCHC 31.9 06/19/2023    RDW 13.8 06/19/2023     06/19/2023     BMP RESULTS:  Lab Results   Component Value Date     11/03/2022    POTASSIUM 4.0 11/03/2022    CHLORIDE 108 11/03/2022    CO2 24 11/03/2022    ANIONGAP 6 11/03/2022     (H) 11/03/2022    BUN 12 11/03/2022    CR 0.68 11/05/2022    GFRESTIMATED 86 11/05/2022    GFRESTIMATED >60 09/16/2019    GFRESTBLACK >60 09/16/2019    JAMES 8.7 11/03/2022      A1C RESULTS:  Lab Results   Component Value Date    A1C 5.9 (H) 11/01/2022     INR RESULTS:  Lab Results   Component Value Date    INR 1.12 11/02/2022    INR 1.01 11/01/2022            Medications     Current Outpatient " Medications   Medication Sig Dispense Refill    albuterol (PROAIR HFA/PROVENTIL HFA/VENTOLIN HFA) 108 (90 Base) MCG/ACT inhaler Inhale 1-2 puffs into the lungs every 4 hours as needed      amLODIPine (NORVASC) 5 MG tablet Take 0.5 tablets (2.5 mg) by mouth daily 30 tablet 3    Aspirin 81 MG CAPS Take by mouth daily      atorvastatin (LIPITOR) 20 MG tablet Take 1 tablet (20 mg) by mouth every evening 30 tablet 0    bimatoprost (LATISSE) 0.03 % external opthalmic solution Apply 1 drop topically nightly as needed (apply to eye lashes)      calcium citrate-vitamin D (CITRACAL) 315-200 MG-UNIT TABS per tablet Take 2 tablets by mouth 2 times daily      Cholecalciferol (VITAMIN D3) 25 MCG (1000 UT) CAPS       clobetasol (TEMOVATE) 0.05 % external ointment Apply topically 2 times daily as needed (rash)      coenzyme Q-10 200 MG CAPS Take 200 mg by mouth daily      cycloSPORINE (RESTASIS) 0.05 % ophthalmic emulsion Apply 1 drop to eye 2 times daily as needed for dry eyes      DULoxetine (CYMBALTA) 20 MG capsule Take 60 mg by mouth daily      estradiol (ESTRACE) 0.1 MG/GM vaginal cream Place 2 g vaginally twice a week Once or twice weekly      famotidine (PEPCID) 20 MG tablet Take 1 tablet (20 mg) by mouth 2 times daily 60 tablet 0    hydrocortisone (CORTAID) 1 % external cream Apply topically 2 times daily as needed for rash      ipratropium (ATROVENT) 0.03 % nasal spray Spray 2 sprays in nostril daily as needed      ketoconazole (NIZORAL) 2 % external cream Apply topically 2 times daily as needed for itching      losartan (COZAAR) 100 MG tablet Take 1 tablet (100 mg) by mouth daily 30 tablet 3    Multiple Vitamins-Minerals (EYE VITAMINS PO) Take by mouth 2 times daily      multivitamin (CENTRUM SILVER) tablet Take 1 tablet by mouth daily      polyethylene glycol-propylene glycol (SYSTANE ULTRA) 0.4-0.3 % SOLN ophthalmic solution Place 1 drop into both eyes every hour as needed for dry eyes      triamcinolone (KENALOG) 0.1 %  external ointment APPLY TOPICALLY TO AFFECTED AREA(S) THREE TIMES DAILY.            Past Medical History   History reviewed. No pertinent past medical history.  Past Surgical History:   Procedure Laterality Date    BIOPSY BREAST Left     benign    BUNIONECTOMY Right 11/7/2014    Procedure: RIGHT 1ST METATARSAL PHALANGEAL JOINT FUSION ;  Surgeon: Washington Blue DPM;  Location: [facility] OR;  Service:     CATARACT EXTRACTION, BILATERAL      COLONOSCOPY  2012    HC REPAIR OF HAMMERTOE,ONE Right 11/7/2014    Procedure: RIGHT 2ND HAMMERTOE CORRECTION;  Surgeon: Washington Blue DPM;  Location: [facility] OR;  Service: Podiatry    HYSTERECTOMY  1993    IR CAROTID CEREBRAL ANGIOGRAM BILATERAL  11/1/2022    OOPHORECTOMY  1993     Family History   Problem Relation Age of Onset    Ovarian Cancer Mother 83    Myocardial Infarction Father     Hereditary Breast and Ovarian Cancer Syndrome No family hx of     Breast Cancer No family hx of     Cancer No family hx of     Colon Cancer No family hx of     Endometrial Cancer No family hx of             Allergies   Codeine, Hydrocodone-acetaminophen, Levofloxacin, Sulfa antibiotics, Levothyroxine, Methotrexate, Tetanus-diphtheria toxoids td, and Tetracyclines & related        Hui Ribeiro NP  [facility] HEART CARE  Pager: [phone]

## 2023-08-28 ENCOUNTER — OFFICE VISIT (OUTPATIENT)
Dept: CARDIOLOGY | Facility: CLINIC | Age: 83
End: 2023-08-28
Attending: NURSE PRACTITIONER
Payer: COMMERCIAL

## 2023-08-28 VITALS
DIASTOLIC BLOOD PRESSURE: 80 MMHG | HEART RATE: 58 BPM | BODY MASS INDEX: 21.25 KG/M2 | WEIGHT: 119.9 LBS | SYSTOLIC BLOOD PRESSURE: 160 MMHG | OXYGEN SATURATION: 100 % | HEIGHT: 63 IN

## 2023-08-28 DIAGNOSIS — I10 PRIMARY HYPERTENSION: Chronic | ICD-10-CM

## 2023-08-28 PROCEDURE — 99214 OFFICE O/P EST MOD 30 MIN: CPT | Performed by: NURSE PRACTITIONER

## 2023-08-28 RX ORDER — AMLODIPINE BESYLATE 5 MG/1
2.5 TABLET ORAL DAILY
Qty: 30 TABLET | Refills: 3 | Status: SHIPPED | OUTPATIENT
Start: 2023-08-28 | End: 2023-09-14

## 2023-08-28 NOTE — PATIENT INSTRUCTIONS
Today's Recommendations    You can start taking the losartan in the morning  We will stop the metoprolol and start the amlodpine at 2.5mg daily   Check your blood pressure at home, about 2 hours after your morning medications  I would like you to come in for a nurse blood pressure check in 2 weeks  Please follow up with me in 2 months.    Please send a Ad[employer] message or call [phone] to the RN team with questions or concerns.     Scheduling number [phone]  JIAN London, CNP

## 2023-08-28 NOTE — LETTER
8/28/2023    Bridget oLpez MD  [address]  [place] [address]    RE: Neena Castellano       Dear Colleague,     I had the pleasure of seeing Neena Castellano in the [facility].  Cardiology Clinic Progress Note  Neena Castellano MRN# [id number]   YOB: 1940 Age: 82 year old   Primary Cardiologist:Dr. Bell Reason for visit: 2 month follow up             Assessment and Plan:     1.  Hypertension, uncontrolled  -Daytime fatigue continued despite reductions in metoprolol, will discontinue this  -Losartan titrated up to 100mg daily  -Start amlodipine 2.5 mg daily    2.  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  Dyslipidemia, controlled   -Continue Lipitor 20 mg daily  -December 2022 LDL 79    4.  Bradycardia  -Log of pulses from home reviewed and now primarily in the high 50-low 60's   -Will discontinue metoprolol and monitor her resting heart rate, possibly contributing to fatigue    5. Spontaneous bruising  -6/2023 Hgb stable at 11.7  -Continues to occur intermittently, advised she follow-up with her PCP    Patient is hypertensive today, however continues to take her losartan and metoprolol in the afternoon.  I advise she start taking her losartan in the morning and discontinue her metoprolol. It is possible that metoprolol is contributing to her ongoing fatigue. She continues to have relatively low heart rates in the 50s at rest, prior to her metoprolol.  I am also starting her on amlodipine for better hypertensive control and warned her about the side effects of dizziness, lightheadedness, and peripheral edema. I advised her to monitor for increased palpitations with stopping the metoprolol.  I will have her come in in 2 weeks for nurse blood pressure check at which point we will likely increase her amlodipine to 5 mg daily.    Changes today:  Discontinue metoprolol, start amlodipine 2.5mg daily, start taking losartan in the morning    Follow up plan: Follow-up for a nurse blood pressure check in 2 weeks, Follow up with me in 2 months         History of Presenting Illness:    Neena Castellano is a very pleasant 82 year old female with a history of CVA secondary to acute left MCA M2 occlusion for which she underwent unsuccessful mechanical thrombectomy (11/2022), residual aphasia, bradycardia, hypertension, mild intermittent asthma, anxiety, history of eustachian dysfunction, former smoker, dyslipidemia, osteoarthritis, GERD.    Patient was seen by Dr. Bell in December 2022 following her hospitalization for her CVA.  During that hospitalization she had an echocardiogram which showed normal left and right ventricular function with LVEF 60 to 65%.  A 30-day Zio patch monitor which did not show any atrial fibrillation.  Her baseline rhythm was sinus bradycardia with a heart rate of 57, first-degree AV block, with occasional PACs.  Some of her heart rates did go into the 40s.  During her clinic visit they discussed her fatigue as well as bradycardia and reduced her dose of metoprolol to 25 mg daily. They also started losartan, which was subsequently increased to 50mg daily following review of several home blood pressure logs.     When I met Lynsey in April of 2023, she continued to feel fatigued, despite reduction in her metoprolol. We reduced the dose further to 12.5mg daily and increased her losartan to 75mg daily     In June 2023 we increased her losartan to 100 mg daily.  We just checked a CBC due to ongoing fatigue and bruising which showed stable hemoglobin at 11.7 and platelet 203.    Patient is here today for follow-up of her hypertension. She is very hard of hearing and her daughter is present with her today.  She continues to feel fatigued, especially if she goes out of the house. Her energy level . She is not napping during the day and sleeps  well at night. She walks half an hour on the treadmill and bikes half an hour every day.     At night, when she lies down to sleep, she will intermittently feel a few palpitations. She has no associated symptoms during these times and are not significantly bothersome to her. They resolve after a few beats.     Patient denies chest pain or chest tightness. Denies dizziness, lightheadedness or other presyncopal symptoms.  Denies shortness of breath, with apnea, or PND.     Blood pressure 160/80 and HR 58 in clinic today.  She recently got a new home blood pressure cuff and readings have been running in the systolic 150-160 range.        Recent Hospitalizations   2022 embolic CVA        Social History      Social History     Socioeconomic History    Marital status:      Spouse name: Not on file    Number of children: Not on file    Years of education: Not on file    Highest education level: Not on file   Occupational History    Not on file   Tobacco Use    Smoking status: Former     Types: Cigarettes     Quit date: 1989     Years since quittin.5    Smokeless tobacco: Never   Substance and Sexual Activity    Alcohol use: Not Currently    Drug use: Not on file    Sexual activity: Not on file   Other Topics Concern    Not on file   Social History Narrative    Not on file     Social Determinants of Health     Financial Resource Strain: Not on file   Food Insecurity: Not on file   Transportation Needs: Not on file   Physical Activity: Not on file   Stress: Not on file   Social Connections: Not on file   Intimate Partner Violence: Not on file   Housing Stability: Not on file            Review of Systems:   Skin:        Eyes:       ENT:       Respiratory:  Negative for shortness of breath;dyspnea on exertion;cough;wheezing;sleep apnea;CPAP  Cardiovascular:  Negative for;chest pain;palpitations;edema;dizziness;lightheadedness;syncope or near-syncope;exercise intolerance fatigue;Positive  "for  Gastroenterology:      Genitourinary:       Musculoskeletal:  Positive for joint pain  Neurologic:       Psychiatric:       Heme/Lymph/Imm:       Endocrine:  Negative           Physical Exam:   Vitals: BP (!) 160/80   Pulse 58   Ht 1.607 m (5' 3.25\")   Wt 54.4 kg (119 lb 14.4 oz)   SpO2 100%   BMI 21.07 kg/m     Wt Readings from Last 4 Encounters:   08/28/23 54.4 kg (119 lb 14.4 oz)   06/19/23 53.8 kg (118 lb 8 oz)   06/19/23 53.1 kg (117 lb)   04/12/23 53.6 kg (118 lb 1.6 oz)     GEN: well nourished, in no acute distress.  HEENT:  Pupils equal, round. Sclerae nonicteric. Bilateral hearing aids, [place]  NECK: Supple, no masses appreciated.  C/V:  Regular rate and rhythm, no rub or gallop.  I/VI apical systolic murmur  RESP: Respirations are unlabored. Clear to auscultation bilaterally without wheezing, rales, or rhonchi.  GI: Abdomen soft, nontender.  EXTREM: No LE edema.  NEURO: Alert and oriented, cooperative. Intermittent asphasia  SKIN: Warm and dry. Scattered large area of flat bruising with surrounding petechiae on posterior neck        Data:     LIPID RESULTS:  Lab Results   Component Value Date    CHOL 164 11/01/2022    HDL 53 11/01/2022    LDL 79 11/01/2022    TRIG 162 (H) 11/01/2022     LIVER ENZYME RESULTS:  Lab Results   Component Value Date    AST 37 09/16/2019    ALT 24 09/16/2019     CBC RESULTS:  Lab Results   Component Value Date    WBC 5.4 06/19/2023    RBC 3.79 (L) 06/19/2023    HGB 11.7 06/19/2023    HCT 36.7 06/19/2023    MCV 97 06/19/2023    MCH 30.9 06/19/2023    MCHC 31.9 06/19/2023    RDW 13.8 06/19/2023     06/19/2023     BMP RESULTS:  Lab Results   Component Value Date     11/03/2022    POTASSIUM 4.0 11/03/2022    CHLORIDE 108 11/03/2022    CO2 24 11/03/2022    ANIONGAP 6 11/03/2022     (H) 11/03/2022    BUN 12 11/03/2022    CR 0.68 11/05/2022    GFRESTIMATED 86 11/05/2022    GFRESTIMATED >60 09/16/2019    GFRESTBLACK >60 09/16/2019    JAMES 8.7 11/03/2022      A1C " RESULTS:  Lab Results   Component Value Date    A1C 5.9 (H) 11/01/2022     INR RESULTS:  Lab Results   Component Value Date    INR 1.12 11/02/2022    INR 1.01 11/01/2022            Medications     Current Outpatient Medications   Medication Sig Dispense Refill    albuterol (PROAIR HFA/PROVENTIL HFA/VENTOLIN HFA) 108 (90 Base) MCG/ACT inhaler Inhale 1-2 puffs into the lungs every 4 hours as needed      amLODIPine (NORVASC) 5 MG tablet Take 0.5 tablets (2.5 mg) by mouth daily 30 tablet 3    Aspirin 81 MG CAPS Take by mouth daily      atorvastatin (LIPITOR) 20 MG tablet Take 1 tablet (20 mg) by mouth every evening 30 tablet 0    bimatoprost (LATISSE) 0.03 % external opthalmic solution Apply 1 drop topically nightly as needed (apply to eye lashes)      calcium citrate-vitamin D (CITRACAL) 315-200 MG-UNIT TABS per tablet Take 2 tablets by mouth 2 times daily      Cholecalciferol (VITAMIN D3) 25 MCG (1000 UT) CAPS       clobetasol (TEMOVATE) 0.05 % external ointment Apply topically 2 times daily as needed (rash)      coenzyme Q-10 200 MG CAPS Take 200 mg by mouth daily      cycloSPORINE (RESTASIS) 0.05 % ophthalmic emulsion Apply 1 drop to eye 2 times daily as needed for dry eyes      DULoxetine (CYMBALTA) 20 MG capsule Take 60 mg by mouth daily      estradiol (ESTRACE) 0.1 MG/GM vaginal cream Place 2 g vaginally twice a week Once or twice weekly      famotidine (PEPCID) 20 MG tablet Take 1 tablet (20 mg) by mouth 2 times daily 60 tablet 0    hydrocortisone (CORTAID) 1 % external cream Apply topically 2 times daily as needed for rash      ipratropium (ATROVENT) 0.03 % nasal spray Spray 2 sprays in nostril daily as needed      ketoconazole (NIZORAL) 2 % external cream Apply topically 2 times daily as needed for itching      losartan (COZAAR) 100 MG tablet Take 1 tablet (100 mg) by mouth daily 30 tablet 3    Multiple Vitamins-Minerals (EYE VITAMINS PO) Take by mouth 2 times daily      multivitamin (CENTRUM SILVER) tablet  Take 1 tablet by mouth daily      polyethylene glycol-propylene glycol (SYSTANE ULTRA) 0.4-0.3 % SOLN ophthalmic solution Place 1 drop into both eyes every hour as needed for dry eyes      triamcinolone (KENALOG) 0.1 % external ointment APPLY TOPICALLY TO AFFECTED AREA(S) THREE TIMES DAILY.            Past Medical History   History reviewed. No pertinent past medical history.  Past Surgical History:   Procedure Laterality Date    BIOPSY BREAST Left     benign    BUNIONECTOMY Right 11/7/2014    Procedure: RIGHT 1ST METATARSAL PHALANGEAL JOINT FUSION ;  Surgeon: Washington Blue DPM;  Location: [facility];  Service:     CATARACT EXTRACTION, BILATERAL      COLONOSCOPY  2012    HC REPAIR OF HAMMERTOE,ONE Right 11/7/2014    Procedure: RIGHT 2ND HAMMERTOE CORRECTION;  Surgeon: Washington Blue DPM;  Location: [facility];  Service: Podiatry    HYSTERECTOMY  1993    IR CAROTID CEREBRAL ANGIOGRAM BILATERAL  11/1/2022    OOPHORECTOMY  1993     Family History   Problem Relation Age of Onset    Ovarian Cancer Mother 83    Myocardial Infarction Father     Hereditary Breast and Ovarian Cancer Syndrome No family hx of     Breast Cancer No family hx of     Cancer No family hx of     Colon Cancer No family hx of     Endometrial Cancer No family hx of             Allergies   Codeine, Hydrocodone-acetaminophen, Levofloxacin, Sulfa antibiotics, Levothyroxine, Methotrexate, Tetanus-diphtheria toxoids td, and Tetracyclines & related        Hui Ribeiro NP  [facility] HEART CARE  Pager: [phone]      Thank you for allowing me to participate in the care of your patient.      Sincerely,     Hui Ribeiro NP     [facility]  cc:   Hui Ribeiro NP  [address]  [place] [address]

## 2023-09-11 ENCOUNTER — DOCUMENTATION ONLY (OUTPATIENT)
Dept: CARDIOLOGY | Facility: CLINIC | Age: 83
End: 2023-09-11

## 2023-09-11 ENCOUNTER — ALLIED HEALTH/NURSE VISIT (OUTPATIENT)
Dept: CARDIOLOGY | Facility: CLINIC | Age: 83
End: 2023-09-11
Attending: NURSE PRACTITIONER
Payer: COMMERCIAL

## 2023-09-11 VITALS — DIASTOLIC BLOOD PRESSURE: 76 MMHG | SYSTOLIC BLOOD PRESSURE: 148 MMHG | HEART RATE: 65 BPM

## 2023-09-11 DIAGNOSIS — I10 PRIMARY HYPERTENSION: Chronic | ICD-10-CM

## 2023-09-11 PROCEDURE — 99207 PR NO CHARGE LOS: CPT

## 2023-09-11 NOTE — PROGRESS NOTES
"[facility] BLOOD PRESSURE CHECK     Last office visit: 8/28/23    Previous blood pressure: 160/80 mm Hg  Previous heart rate: 58 bpm      Time of visit: 100 am    Morning medications were taken at: 8 am     Today's blood pressure: 148/76 mm Hg  Today's heart rate: 65 bpm     Home monitor blood pressure: 148/78 mmHg  Home monitor heart rate: 70 bpm  (taken on personal machine in clinic)      Additional Comments: wakes up in the morning \"completely drained and feeling like she going to die\". She states she feels \"depleted\".  This resolves after taking her morning dose medication. And she feels fine the rest of the day    BP from home- pt takes BP 2 hours after taking medications    8/28/23-   AM-129/64 pulse 60  PM- 132/66 pulse 58    8/31/23  AM- 132/64 pulse 40  /64 pulse 61    9/2/23  /56  pulse 69    9/3/23-  123/60 pulse 70    9/5/23  AM-104/75 pulse 75  PM- 126/58 pulse 68    9/7/23  115/56 pulse 64    9/8/23-  131/61 pulse 65    9/9/23   128/65 pulse 62    9/10/23  130/64 pulse 75    **Pt did have coffee this morning, but otherwise does not drink it.     Results routed to: Radha Ribeiro, Team 5      Ordering Provider: Radha Ribeiro  In clinic Provider: Dr Ruggiero  "

## 2023-09-14 ENCOUNTER — MYC MEDICAL ADVICE (OUTPATIENT)
Dept: CARDIOLOGY | Facility: CLINIC | Age: 83
End: 2023-09-14
Payer: COMMERCIAL

## 2023-09-14 DIAGNOSIS — I10 PRIMARY HYPERTENSION: Chronic | ICD-10-CM

## 2023-09-14 RX ORDER — AMLODIPINE BESYLATE 5 MG/1
5 TABLET ORAL DAILY
Qty: 30 TABLET | Refills: 11 | Status: SHIPPED | OUTPATIENT
Start: 2023-09-14 | End: 2023-10-17

## 2023-09-14 NOTE — TELEPHONE ENCOUNTER
Patient has been taking Amlodipine 5 mg daily by mistake instead of the prescribed 2.5 mg.   Provider to advise if patient should continue this dose as her BP has been controlled - see 9/11/23 BP check encounter.  Kate Strange RN on 9/14/2023 at 1:28 PM

## 2023-09-14 NOTE — TELEPHONE ENCOUNTER
Spoke with patient and daughter regarding recommendation to continue Amlodipine 5 mg daily dosing.  Patient and daughter have verbalized understanding.  Follow up scheduled 10/26/23 with Radha Ribeiro.  Kate Strange RN on 9/14/2023 at 3:21 PM

## 2023-10-10 ENCOUNTER — MYC MEDICAL ADVICE (OUTPATIENT)
Dept: CARDIOLOGY | Facility: CLINIC | Age: 83
End: 2023-10-10
Payer: COMMERCIAL

## 2023-10-10 DIAGNOSIS — I10 PRIMARY HYPERTENSION: Primary | ICD-10-CM

## 2023-10-10 DIAGNOSIS — I63.9 ACUTE CVA (CEREBROVASCULAR ACCIDENT) (H): ICD-10-CM

## 2023-10-10 NOTE — TELEPHONE ENCOUNTER
Called pt, states for the past 2-3 days, her diastolic BP has been running high 40's to low 50's which she is very concerned about. Yesterday when she was at a store, she was feeling lightheaded & dizzy, felt like she might have passed out, BP was 105/58 w/ pulse of 61. Pt states on 10/01, BP was 133/70 w/ HR 68. She then cleaned her house and BP after cleaning was 108/59 w/ HR of 78. Pt states she feels 100 mg of losartan is too much for her. Will message Dr. Bell to review in Radha Ribeiro NP's absence. Gerri العراقي

## 2023-10-12 NOTE — TELEPHONE ENCOUNTER
Attempted to call pt with recommendations from Dr. Pires, left message for pt to call back. Gerri العراقي

## 2023-10-13 RX ORDER — LOSARTAN POTASSIUM 50 MG/1
50 TABLET ORAL DAILY
Qty: 90 TABLET | Refills: 3 | Status: SHIPPED | OUTPATIENT
Start: 2023-10-13

## 2023-10-13 NOTE — TELEPHONE ENCOUNTER
Genia Bell MD   to [facility] Heart Team 1  Hui Ribeiro NP   [place]    10/12/23  1:32 PM  Ok to decrease losartan to 50mg PO daily at this time although it is not clear that low BP is necessarily causing all of these symptoms if symptoms also occur with SBP of 130.    Recommend BMP prior to upcoming cardiology visit on 10/26.      Also recommend PCP visit for abdominal pain for several months which may be playing a role in her current symptoms, especially if she has been losing weight.    Genia Bell MD on 10/12/2023 at 1:32 PM      Reviewed recommendations with patient and daughter Sapna who have verbalized understanding.  Message sent to scheduling to call patient to schedule BMP before next OV.  Kate Strange RN on 10/13/2023 at 4:47 PM

## 2023-10-17 DIAGNOSIS — I10 PRIMARY HYPERTENSION: Chronic | ICD-10-CM

## 2023-10-17 RX ORDER — AMLODIPINE BESYLATE 5 MG/1
5 TABLET ORAL DAILY
Qty: 90 TABLET | Refills: 0 | Status: SHIPPED | OUTPATIENT
Start: 2023-10-17

## 2023-10-19 ENCOUNTER — LAB (OUTPATIENT)
Dept: LAB | Facility: CLINIC | Age: 83
End: 2023-10-19
Payer: COMMERCIAL

## 2023-10-19 DIAGNOSIS — I10 PRIMARY HYPERTENSION: ICD-10-CM

## 2023-10-19 LAB
ANION GAP SERPL CALCULATED.3IONS-SCNC: 8 MMOL/L (ref 7–15)
BUN SERPL-MCNC: 22.6 MG/DL (ref 8–23)
CALCIUM SERPL-MCNC: 9.6 MG/DL (ref 8.8–10.2)
CHLORIDE SERPL-SCNC: 102 MMOL/L (ref 98–107)
CREAT SERPL-MCNC: 0.92 MG/DL (ref 0.51–0.95)
DEPRECATED HCO3 PLAS-SCNC: 29 MMOL/L (ref 22–29)
EGFRCR SERPLBLD CKD-EPI 2021: 61 ML/MIN/1.73M2
GLUCOSE SERPL-MCNC: 89 MG/DL (ref 70–99)
POTASSIUM SERPL-SCNC: 4.1 MMOL/L (ref 3.4–5.3)
SODIUM SERPL-SCNC: 139 MMOL/L (ref 135–145)

## 2023-10-19 PROCEDURE — 80048 BASIC METABOLIC PNL TOTAL CA: CPT | Performed by: INTERNAL MEDICINE

## 2023-10-19 PROCEDURE — 36415 COLL VENOUS BLD VENIPUNCTURE: CPT | Performed by: INTERNAL MEDICINE

## 2024-09-22 ENCOUNTER — HEALTH MAINTENANCE LETTER (OUTPATIENT)
Age: 84
End: 2024-09-22

## (undated) RX ORDER — HEPARIN SODIUM 200 [USP'U]/100ML
INJECTION, SOLUTION INTRAVENOUS
Status: DISPENSED
Start: 2022-11-01

## (undated) RX ORDER — FENTANYL CITRATE 50 UG/ML
INJECTION, SOLUTION INTRAMUSCULAR; INTRAVENOUS
Status: DISPENSED
Start: 2022-11-01